# Patient Record
Sex: MALE | Race: WHITE | NOT HISPANIC OR LATINO | Employment: OTHER | URBAN - METROPOLITAN AREA
[De-identification: names, ages, dates, MRNs, and addresses within clinical notes are randomized per-mention and may not be internally consistent; named-entity substitution may affect disease eponyms.]

---

## 2017-09-20 ENCOUNTER — APPOINTMENT (EMERGENCY)
Dept: RADIOLOGY | Facility: HOSPITAL | Age: 57
End: 2017-09-20
Payer: COMMERCIAL

## 2017-09-20 ENCOUNTER — HOSPITAL ENCOUNTER (EMERGENCY)
Facility: HOSPITAL | Age: 57
Discharge: HOME | End: 2017-09-20
Admitting: EMERGENCY MEDICINE
Payer: COMMERCIAL

## 2017-09-20 VITALS
RESPIRATION RATE: 16 BRPM | DIASTOLIC BLOOD PRESSURE: 72 MMHG | TEMPERATURE: 98.2 F | HEIGHT: 72 IN | BODY MASS INDEX: 20.59 KG/M2 | OXYGEN SATURATION: 95 % | WEIGHT: 152 LBS | HEART RATE: 88 BPM | SYSTOLIC BLOOD PRESSURE: 124 MMHG

## 2017-09-20 DIAGNOSIS — S39.013A STRAIN OF MUSCLE OF RIGHT GROIN REGION: Primary | ICD-10-CM

## 2017-09-20 PROCEDURE — 73502 X-RAY EXAM HIP UNI 2-3 VIEWS: CPT

## 2017-09-20 PROCEDURE — 99283 EMERGENCY DEPT VISIT LOW MDM: CPT

## 2017-09-20 RX ORDER — PHENYTOIN SODIUM 300 MG/1
100 CAPSULE, EXTENDED RELEASE ORAL DAILY
COMMUNITY
End: 2019-07-30 | Stop reason: HOSPADM

## 2017-09-20 RX ORDER — OXYCODONE HYDROCHLORIDE 15 MG/1
15 TABLET, FILM COATED, EXTENDED RELEASE ORAL
COMMUNITY
End: 2019-07-30 | Stop reason: HOSPADM

## 2017-09-20 RX ORDER — LORAZEPAM 1 MG/1
0.5 TABLET ORAL EVERY 8 HOURS PRN
Status: ON HOLD | COMMUNITY
End: 2019-09-07 | Stop reason: CLARIF

## 2017-09-20 RX ORDER — IBUPROFEN 800 MG/1
800 TABLET ORAL EVERY 8 HOURS PRN
COMMUNITY
End: 2019-09-06 | Stop reason: ALTCHOICE

## 2017-09-20 RX ORDER — LIDOCAINE 50 MG/G
1 PATCH TOPICAL ONCE
Status: DISCONTINUED | OUTPATIENT
Start: 2017-09-20 | End: 2017-09-20 | Stop reason: HOSPADM

## 2017-09-20 RX ORDER — KETOROLAC TROMETHAMINE 30 MG/ML
15 INJECTION, SOLUTION INTRAMUSCULAR; INTRAVENOUS ONCE
Status: DISCONTINUED | OUTPATIENT
Start: 2017-09-20 | End: 2017-09-20

## 2017-09-20 RX ORDER — DIVALPROEX SODIUM 250 MG/1
250 TABLET, DELAYED RELEASE ORAL EVERY EVENING
Status: ON HOLD | COMMUNITY
End: 2019-07-30 | Stop reason: SDUPTHER

## 2017-09-20 RX ADMIN — LIDOCAINE 1 PATCH: 50 PATCH CUTANEOUS at 15:57

## 2017-11-21 ENCOUNTER — APPOINTMENT (EMERGENCY)
Dept: RADIOLOGY | Facility: HOSPITAL | Age: 57
End: 2017-11-21
Payer: COMMERCIAL

## 2017-11-21 ENCOUNTER — HOSPITAL ENCOUNTER (EMERGENCY)
Facility: HOSPITAL | Age: 57
Discharge: HOME | End: 2017-11-21
Attending: EMERGENCY MEDICINE
Payer: COMMERCIAL

## 2017-11-21 VITALS
TEMPERATURE: 98 F | HEIGHT: 72 IN | WEIGHT: 157 LBS | OXYGEN SATURATION: 97 % | DIASTOLIC BLOOD PRESSURE: 81 MMHG | BODY MASS INDEX: 21.26 KG/M2 | SYSTOLIC BLOOD PRESSURE: 136 MMHG | RESPIRATION RATE: 18 BRPM | HEART RATE: 84 BPM

## 2017-11-21 DIAGNOSIS — S20.219A: Primary | ICD-10-CM

## 2017-11-21 PROCEDURE — 71101 X-RAY EXAM UNILAT RIBS/CHEST: CPT

## 2017-11-21 PROCEDURE — 99283 EMERGENCY DEPT VISIT LOW MDM: CPT

## 2017-11-21 RX ORDER — TRAMADOL HYDROCHLORIDE 50 MG/1
50 TABLET ORAL EVERY 6 HOURS PRN
Qty: 20 TABLET | Refills: 0 | Status: SHIPPED | OUTPATIENT
Start: 2017-11-21 | End: 2019-07-30 | Stop reason: HOSPADM

## 2017-11-21 NOTE — DISCHARGE INSTRUCTIONS
Rib Contusion   WHAT YOU NEED TO KNOW:   A rib contusion is a bruise on one or more of your ribs  DISCHARGE INSTRUCTIONS:   Return to the emergency department if:   · You have increased chest pain  · You have shortness of breath  · You start to cough up blood  · Your pain does not improve with pain medicine  Contact your healthcare provider if:   · You have a cough  · You have a fever  · You have questions or concerns about your condition or care  Medicines: You may need any of the following:  · NSAIDs , such as ibuprofen, help decrease swelling, pain, and fever  This medicine is available with or without a doctor's order  NSAIDs can cause stomach bleeding or kidney problems in certain people  If you take blood thinner medicine, always ask if NSAIDs are safe for you  Always read the medicine label and follow directions  Do not give these medicines to children under 10months of age without direction from your child's healthcare provider  · Prescription pain medicine  may be given  Ask how to take this medicine safely  · Take your medicine as directed  Contact your healthcare provider if you think your medicine is not helping or if you have side effects  Tell him of her if you are allergic to any medicine  Keep a list of the medicines, vitamins, and herbs you take  Include the amounts, and when and why you take them  Bring the list or the pill bottles to follow-up visits  Carry your medicine list with you in case of an emergency  Deep breathing:   · To help prevent pneumonia, take 10 deep breaths every hour, even when you wake up during the night  Brace your ribs with your hands or a pillow while you take deep breaths or cough  This will help decrease your pain  · You may need to use an incentive spirometer to help you take deeper breaths  Put the plastic piece into your mouth and take a very deep breath  Hold your breath as long as you can  Then let out your breath   Do this 10 times in a row every hour while you are awake  Rest:  Rest your ribs to decrease swelling and allow the injury to heal faster  Avoid activities that may cause more pain or damage to your ribs  As your pain decreases, begin movements slowly  Ice:  Ice helps decrease swelling and pain  Ice may also help prevent tissue damage  Use an ice pack or put crushed ice in a plastic bag  Cover it with a towel and place it on your bruised area for 15 to 20 minutes every hour as directed  Follow up with your healthcare provider as directed:  Write down your questions so you remember to ask them during your visits  © 2017 [facility] Information is for End User's use only and may not be sold, redistributed or otherwise used for commercial purposes  All illustrations and images included in CareNotes® are the copyrighted property of [employer]  or [employer]  The above information is an  only  It is not intended as medical advice for individual conditions or treatments  Talk to your doctor, nurse or pharmacist before following any medical regimen to see if it is safe and effective for you    Rest, sleep with head elevated, Ibuprofen or ultram for pain, activity as tolerated, recheck with PCP as needed

## 2017-11-21 NOTE — ED PROVIDER NOTES
History  Chief Complaint   Patient presents with    Rib Pain     Pt fell two days ago and hit the left side of his cheast and is having pain  Pt fell against carpet roll 2 days ago and struck L lateral ribs  Hx of several old rib fractures, c/o increased pain with movement        History provided by:  Patient   used: No    Chest Pain   Pain location:  L lateral chest  Pain quality: sharp and shooting    Pain radiates to:  Does not radiate  Pain radiates to the back: no    Pain severity:  Moderate  Onset quality:  Sudden  Duration:  2 days  Progression:  Waxing and waning  Chronicity:  New  Context: breathing and movement    Relieved by:  Nothing  Worsened by: Movement      Prior to Admission Medications   Prescriptions Last Dose Informant Patient Reported? Taking? LORazepam (ATIVAN) 1 mg tablet   Yes No   Sig: Take 0 5 mg by mouth every 8 (eight) hours as needed for anxiety   divalproex sodium (DEPAKOTE) 500 mg EC tablet   Yes No   Sig: Take 25,000 mg by mouth daily   ibuprofen (MOTRIN) 800 mg tablet   Yes No   Sig: Take 800 mg by mouth every 8 (eight) hours as needed for mild pain   oxyCODONE (OxyCONTIN) 15 mg 12 hr tablet   Yes No   Sig: Take 10 mg by mouth every 4 (four) hours   phenytoin extended (DILANTIN) 300 MG ER capsule   Yes No   Sig: Take 300 mg by mouth daily      Facility-Administered Medications: None       Past Medical History:   Diagnosis Date    Anxiety     Back pain     History of herniated intervertebral disc     Seizures (HCC)     TBI (traumatic brain injury) ([facility] [address] )        Past Surgical History:   Procedure Laterality Date    BRAIN SURGERY         History reviewed  No pertinent family history  I have reviewed and agree with the history as documented      Social History   Substance Use Topics    Smoking status: Current Every Day Smoker     Types: Cigarettes    Smokeless tobacco: Current User    Alcohol use No        Review of Systems   Cardiovascular: Positive for chest pain  All other systems reviewed and are negative  Physical Exam  ED Triage Vitals [11/21/17 1712]   Temperature Pulse Respirations Blood Pressure SpO2   98 °F (36 7 °C) 84 18 136/81 97 %      Temp Source Heart Rate Source Patient Position - Orthostatic VS BP Location FiO2 (%)   Tympanic Monitor -- Right arm --      Pain Score       --           Orthostatic Vital Signs  Vitals:    11/21/17 1712   BP: 136/81   Pulse: 84       Physical Exam   Constitutional: He is oriented to person, place, and time  He appears well-developed and well-nourished  HENT:   Nose: Nose normal    Eyes: Pupils are equal, round, and reactive to light  Neck: Normal range of motion  Cardiovascular: Normal rate and regular rhythm  Pulmonary/Chest: Effort normal and breath sounds normal  He exhibits tenderness  Abdominal: Soft  Bowel sounds are normal    Neurological: He is alert and oriented to person, place, and time  Skin: Skin is warm and dry  Psychiatric: He has a normal mood and affect  His behavior is normal  Judgment and thought content normal    Nursing note and vitals reviewed  ED Medications  Medications - No data to display    Diagnostic Studies  Results Reviewed     None                 XR ribs with pa chest min 3 views LEFT   Final Result by Reynaldo Lomax MD (11/21 1810)      1  COPD without acute pulmonary findings  2   No evidence of rib fractures               Workstation performed: [id number]                    Procedures  Procedures       Phone Contacts  ED Phone Contact    ED Course  ED Course                                MDM  Number of Diagnoses or Management Options  Contusion of ribs: new and requires workup     Amount and/or Complexity of Data Reviewed  Tests in the radiology section of CPT®: ordered and reviewed    Patient Progress  Patient progress: stable    CritCare Time    Disposition  Final diagnoses:   Contusion of ribs     Time reflects when diagnosis was documented in both MDM as applicable and the Disposition within this note     Time User Action Codes Description Comment    11/21/2017  6:12 PM Kelly Marc Add [S20 [id number]] Contusion of ribs       ED Disposition     ED Disposition Condition Comment    Discharge  [facility] discharge to home/self care  Condition at discharge: Stable        Follow-up Information     Follow up With Specialties Details Why [address], DO Family Medicine  As needed, for follow-up [address]  [address] [place] [address] [place] [address] Road          Discharge Medication List as of 11/21/2017  6:14 PM      START taking these medications    Details   traMADol (ULTRAM) 50 mg tablet Take 1 tablet by mouth every 6 (six) hours as needed for moderate pain for up to 20 doses, Starting Tue 11/21/2017, Print         CONTINUE these medications which have NOT CHANGED    Details   divalproex sodium (DEPAKOTE) 500 mg EC tablet Take 25,000 mg by mouth daily, Historical Med      ibuprofen (MOTRIN) 800 mg tablet Take 800 mg by mouth every 8 (eight) hours as needed for mild pain, Historical Med      LORazepam (ATIVAN) 1 mg tablet Take 0 5 mg by mouth every 8 (eight) hours as needed for anxiety, Historical Med      oxyCODONE (OxyCONTIN) 15 mg 12 hr tablet Take 10 mg by mouth every 4 (four) hours, Historical Med      phenytoin extended (DILANTIN) 300 MG ER capsule Take 300 mg by mouth daily, Historical Med           No discharge procedures on file      ED Provider  Electronically Signed by           Jose Eldridge MD  11/22/17 3333

## 2018-03-31 ENCOUNTER — HOSPITAL ENCOUNTER (EMERGENCY)
Facility: HOSPITAL | Age: 58
Discharge: HOME | End: 2018-03-31
Attending: EMERGENCY MEDICINE | Admitting: EMERGENCY MEDICINE
Payer: COMMERCIAL

## 2018-03-31 ENCOUNTER — APPOINTMENT (EMERGENCY)
Dept: CT IMAGING | Facility: HOSPITAL | Age: 58
End: 2018-03-31
Payer: COMMERCIAL

## 2018-03-31 VITALS
TEMPERATURE: 98.3 F | HEIGHT: 72 IN | WEIGHT: 151.13 LBS | BODY MASS INDEX: 20.47 KG/M2 | OXYGEN SATURATION: 98 % | DIASTOLIC BLOOD PRESSURE: 59 MMHG | RESPIRATION RATE: 18 BRPM | HEART RATE: 80 BPM | SYSTOLIC BLOOD PRESSURE: 119 MMHG

## 2018-03-31 DIAGNOSIS — S01.01XA SCALP LACERATION, INITIAL ENCOUNTER: ICD-10-CM

## 2018-03-31 DIAGNOSIS — S09.90XA INJURY OF HEAD, INITIAL ENCOUNTER: Primary | ICD-10-CM

## 2018-03-31 PROCEDURE — 70450 CT HEAD/BRAIN W/O DYE: CPT

## 2018-03-31 PROCEDURE — 99283 EMERGENCY DEPT VISIT LOW MDM: CPT

## 2018-03-31 RX ORDER — GINSENG 100 MG
1 CAPSULE ORAL ONCE
Status: COMPLETED | OUTPATIENT
Start: 2018-03-31 | End: 2018-03-31

## 2018-03-31 RX ORDER — LIDOCAINE HYDROCHLORIDE AND EPINEPHRINE 10; 10 MG/ML; UG/ML
1 INJECTION, SOLUTION INFILTRATION; PERINEURAL ONCE
Status: COMPLETED | OUTPATIENT
Start: 2018-03-31 | End: 2018-03-31

## 2018-03-31 RX ADMIN — BACITRACIN ZINC 1 SMALL APPLICATION: 500 OINTMENT TOPICAL at 18:51

## 2018-03-31 RX ADMIN — LIDOCAINE HYDROCHLORIDE,EPINEPHRINE BITARTRATE 1 ML: 10; .01 INJECTION, SOLUTION INFILTRATION; PERINEURAL at 18:20

## 2018-03-31 NOTE — DISCHARGE INSTRUCTIONS
Head Injury   WHAT YOU NEED TO KNOW:   A head injury is most often caused by a blow to the head  This may occur from a fall, bicycle injury, sports injury, being struck in the head, or a motor vehicle accident  DISCHARGE INSTRUCTIONS:   Call 911 or have someone else call for any of the following:   · You cannot be woken  · You have a seizure  · You stop responding to others or you faint  · You have blurry or double vision  · Your speech becomes slurred or confused  · You have arm or leg weakness, loss of feeling, or new problems with coordination  · Your pupils are larger than usual or one pupil is a different size than the other  · You have blood or clear fluid coming out of your ears or nose  Seek care immediately if:   · You have repeated or forceful vomiting  · You feel confused  · Your headache gets worse or becomes severe  · You or someone caring for you notices that you are harder to wake than usual   Contact your healthcare provider if:   · Your symptoms last longer than 6 weeks after the injury  · You have questions or concerns about your condition or care  Medicines:   · Acetaminophen  decreases pain  Acetaminophen is available without a doctor's order  Ask how much to take and how often to take it  Follow directions  Acetaminophen can cause liver damage if not taken correctly  · Take your medicine as directed  Contact your healthcare provider if you think your medicine is not helping or if you have side effects  Tell him or her if you are allergic to any medicine  Keep a list of the medicines, vitamins, and herbs you take  Include the amounts, and when and why you take them  Bring the list or the pill bottles to follow-up visits  Carry your medicine list with you in case of an emergency  Self-care:   · Rest  or do quiet activities for 24 to 48 hours  Limit your time watching TV, using the computer, or doing tasks that require a lot of thinking   Slowly return to your normal activities as directed  Do not play sports or do activities that may cause you to get hit in the head  Ask your healthcare provider when you can return to sports  · Apply ice  on your head for 15 to 20 minutes every hour or as directed  Use an ice pack, or put crushed ice in a plastic bag  Cover it with a towel before you apply it to your skin  Ice helps prevent tissue damage and decreases swelling and pain  · Have someone stay with you for 24 hours  or as directed  This person can monitor you for complications and call [phone]  When you are awake the person should ask you a few questions to see if you are thinking clearly  An example would be to ask your name or your address  Prevent another head injury:   · Wear a helmet that fits properly  Do this when you play sports, or ride a bike, scooter, or skateboard  Helmets help decrease your risk of a serious head injury  Talk to your healthcare provider about other ways you can protect yourself if you play sports  · Wear your seat belt every time you are in a car  This helps to decrease your risk for a head injury if you are in a car accident  Follow up with your healthcare provider as directed:  Write down your questions so you remember to ask them during your visits  © 2017 [address] [employer]  Information is for End User's use only and may not be sold, redistributed or otherwise used for commercial purposes  All illustrations and images included in CareNotes® are the copyrighted property of [employer]  or [employer]  The above information is an  only  It is not intended as medical advice for individual conditions or treatments  Talk to your doctor, nurse or pharmacist before following any medical regimen to see if it is safe and effective for you  Laceration   AMBULATORY CARE:   A laceration  is an injury to the skin and the soft tissue underneath it   Lacerations happen when you are cut or hit by something  They can happen anywhere on the body  Common symptoms include the following:   · Injury or wound to skin and tissue of any shape size that looks like a cut, tear, or gash    · Edges of the wound may be close together or wide apart    · Pain, bleeding, bruising, or swelling    · Numbness around the wound    · Decreased movement in an area below the wound  Seek care immediately if:   · You have heavy bleeding or bleeding that does not stop after 10 minutes of holding firm, direct pressure over the wound  · Your wound opens up  Contact your healthcare provider if:   · You have a fever or chills  · Your laceration is red, warm, or swollen  · You have red streaks on your skin coming from your wound  · You have white or yellow drainage from the wound that smells bad  · You have pain that gets worse, even after treatment  · You have questions or concerns about your condition or care  Treatment for a laceration  includes care to stop any bleeding  Your healthcare provider will stop the bleeding by applying pressure to the wound  He may need to check your wound for foreign objects and clean it to decrease the chance of infection  Your laceration may be closed with stitches, staples, tissue glue, or medical strips  Ask your healthcare provider if you need a tetanus shot  Medicines:   · Prescription pain medicine  may be given  Ask how to take this medicine safely  · Antibiotics  help treat or prevent a bacterial infection  · Take your medicine as directed  Contact your healthcare provider if you think your medicine is not helping or if you have side effects  Tell him or her if you are allergic to any medicine  Keep a list of the medicines, vitamins, and herbs you take  Include the amounts, and when and why you take them  Bring the list or the pill bottles to follow-up visits  Carry your medicine list with you in case of an emergency    Care for your wound as directed:   · Do not get your wound wet  until your healthcare provider says it is okay  Do not soak your wound in water  Do not go swimming until your healthcare provider says it is okay  Carefully wash the wound with soap and water  Gently pat the area dry or allow it to air dry  · Change your bandages  when they get wet, dirty, or after washing  Apply new, clean bandages as directed  Do not apply elastic bandages or tape too tight  Do not put powders or lotions over your incision  · Apply antibiotic ointment as directed  Your healthcare provider may give you antibiotic ointment to put over your wound if you have stitches  If you have strips of tape over your incision, let them dry up and fall off on their own  If they do not fall off within 14 days, gently remove them  If you have glue over your wound, do not remove or pick at it  If your glue comes off, do not replace it with glue that you have at home  · Check your wound every day for signs of infection such as swelling, redness, or pus  Self-care:   · Apply ice  on your wound for 15 to 20 minutes every hour or as directed  Use an ice pack, or put crushed ice in a plastic bag  Cover it with a towel  Ice helps prevent tissue damage and decreases swelling and pain  · Use a splint as directed  A splint will decrease movement and stress on your wound  It may help it heal faster  A splint may be used for lacerations over joints or areas of your body that bend  Ask your healthcare provider how to apply and remove a splint  · Decrease scarring of your wound  by applying ointments as directed  Do not apply ointments until your healthcare provider says it is okay  You may need to wait until your wound is healed  Ask which ointment to buy and how often to use it  After your wound is healed, use sunscreen over the area when you are out in the sun  You should do this for at least 6 months to 1 year after your injury    Follow up with your healthcare provider as directed: Write down your questions so you remember to ask them during your visits  © 2017 [address] [employer] Information is for End User's use only and may not be sold, redistributed or otherwise used for commercial purposes  All illustrations and images included in CareNotes® are the copyrighted property of A [employer] M [employer]  or [employer]  The above information is an  only  It is not intended as medical advice for individual conditions or treatments  Talk to your doctor, nurse or pharmacist before following any medical regimen to see if it is safe and effective for you

## 2018-03-31 NOTE — ED NOTES
Discharge instructions reviewed with patient  All questions and concerns addressed        Kendy Cook RN  03/31/18 0775

## 2018-03-31 NOTE — ED NOTES
Physician at bedside suturing patient   Patient tolerated well      Jess Leung, MICHAEL  03/31/18 1622

## 2018-03-31 NOTE — ED NOTES
Physician notified that patient meets trauma criteria  Physician does not feel that patient is a trauma at this time   Proceed with ED narrator      Zena Xie RN  03/31/18 7018

## 2018-03-31 NOTE — ED NOTES
Patient returned from [facility] 029, [address] [facility]  03/31/18 7509

## 2018-03-31 NOTE — ED NOTES
Patient transported to [facility] 270, [address] [facility]  03/31/18 6129

## 2018-03-31 NOTE — ED PROVIDER NOTES
History  No chief complaint on file  Headboard of a bed fell, struck pt on L parietal area; no LOC but pt has a hx of traumatic brain injury  1" lac noted, no bleeding        History provided by:  Patient and friend   used: No    Head Laceration   Location:  Head/neck  Head/neck laceration location:  Scalp  Length:  2 5  Depth: Through dermis  Quality: straight    Bleeding: controlled    Laceration mechanism:  Blunt object  Pain details:     Quality:  Aching    Severity:  Mild    Progression:  Unchanged  Foreign body present:  No foreign bodies  Relieved by:  Nothing  Worsened by:  Nothing  Ineffective treatments:  None tried      Cannot display prior to admission medications because the patient has not been admitted in this contact  Past Medical History:   Diagnosis Date    Anxiety     Back pain     History of herniated intervertebral disc     Seizures (HCC)     TBI (traumatic brain injury) ([facility] [address] )        Past Surgical History:   Procedure Laterality Date    BRAIN SURGERY         No family history on file  I have reviewed and agree with the history as documented  Social History   Substance Use Topics    Smoking status: Current Every Day Smoker     Types: Cigarettes    Smokeless tobacco: Current User    Alcohol use No        Review of Systems   Neurological: Positive for headaches (at site of injury)  All other systems reviewed and are negative  Physical Exam  ED Triage Vitals   Temp Pulse Resp BP SpO2   -- -- -- -- --      Temp src Heart Rate Source Patient Position - Orthostatic VS BP Location FiO2 (%)   -- -- -- -- --      Pain Score       --           Orthostatic Vital Signs  There were no vitals filed for this visit  Physical Exam   Constitutional: He is oriented to person, place, and time  He appears well-developed and well-nourished  HENT:   Nose: Nose normal    Eyes: Pupils are equal, round, and reactive to light  Neck: Normal range of motion  Cardiovascular: Normal rate  Pulmonary/Chest: Effort normal    Abdominal: Soft  Neurological: He is alert and oriented to person, place, and time  Skin: Skin is warm and dry  Psychiatric: He has a normal mood and affect  His behavior is normal  Judgment and thought content normal    Nursing note and vitals reviewed  ED Medications  Medications - No data to display    Diagnostic Studies  Results Reviewed     None                 No orders to display              Procedures  Lac Repair  Date/Time: 3/31/2018 6:48 PM  Performed by: Melani Auguste  Authorized by: Melani Auguste   Consent: Verbal consent obtained  Body area: head/neck  Location details: scalp  Laceration length: 2 5 cm  Anesthesia: local infiltration    Anesthesia:  Local Anesthetic: lidocaine 1% with epinephrine    Sedation:  Patient sedated: no    Wound Dehiscence:  Superficial Wound Dehiscence: simple closure      Procedure Details:  Preparation: Patient was prepped and draped in the usual sterile fashion  Debridement: none  Degree of undermining: none  Skin closure: 5-0 nylon  Technique: simple  Approximation: close  Approximation difficulty: simple  Dressing: antibiotic ointment  Patient tolerance: Patient tolerated the procedure well with no immediate complications             Phone Contacts  ED Phone Contact    ED Course  ED Course                                MDM  Number of Diagnoses or Management Options  Injury of head, initial encounter: new and requires workup  Scalp laceration, initial encounter: new and requires workup     Amount and/or Complexity of Data Reviewed  Tests in the radiology section of CPT®: ordered and reviewed    Patient Progress  Patient progress: stable    CritCare Time    Disposition  Final diagnoses:   None     ED Disposition     None      Follow-up Information    None       Patient's Medications   Discharge Prescriptions    No medications on file     No discharge procedures on file      ED Provider  Electronically Signed by           Stacey Winter MD  04/01/18 7206

## 2019-02-25 ENCOUNTER — TRANSCRIBE ORDERS (OUTPATIENT)
Dept: ADMINISTRATIVE | Facility: HOSPITAL | Age: 59
End: 2019-02-25

## 2019-02-25 ENCOUNTER — HOSPITAL ENCOUNTER (OUTPATIENT)
Dept: RADIOLOGY | Facility: HOSPITAL | Age: 59
Discharge: HOME | End: 2019-02-25
Payer: COMMERCIAL

## 2019-02-25 DIAGNOSIS — M54.5 LOW BACK PAIN, UNSPECIFIED BACK PAIN LATERALITY, UNSPECIFIED CHRONICITY, WITH SCIATICA PRESENCE UNSPECIFIED: Primary | ICD-10-CM

## 2019-02-25 DIAGNOSIS — R63.4 ABNORMAL WEIGHT LOSS: ICD-10-CM

## 2019-02-25 DIAGNOSIS — M54.5 LOW BACK PAIN, UNSPECIFIED BACK PAIN LATERALITY, UNSPECIFIED CHRONICITY, WITH SCIATICA PRESENCE UNSPECIFIED: ICD-10-CM

## 2019-02-25 PROCEDURE — 72110 X-RAY EXAM L-2 SPINE 4/>VWS: CPT

## 2019-02-25 PROCEDURE — 71046 X-RAY EXAM CHEST 2 VIEWS: CPT

## 2019-05-29 ENCOUNTER — TRANSCRIBE ORDERS (OUTPATIENT)
Dept: ADMINISTRATIVE | Facility: HOSPITAL | Age: 59
End: 2019-05-29

## 2019-05-29 ENCOUNTER — APPOINTMENT (OUTPATIENT)
Dept: LAB | Facility: HOSPITAL | Age: 59
End: 2019-05-29
Payer: COMMERCIAL

## 2019-05-29 DIAGNOSIS — M89.9 BONE DISEASE: ICD-10-CM

## 2019-05-29 DIAGNOSIS — E78.00 PURE HYPERCHOLESTEROLEMIA: ICD-10-CM

## 2019-05-29 DIAGNOSIS — S32.010A CLOSED WEDGE COMPRESSION FRACTURE OF FIRST LUMBAR VERTEBRA, INITIAL ENCOUNTER: ICD-10-CM

## 2019-05-29 DIAGNOSIS — M89.9 BONE DISEASE: Primary | ICD-10-CM

## 2019-05-29 LAB
25(OH)D3 SERPL-MCNC: 7.1 NG/ML (ref 30–100)
ALBUMIN SERPL BCP-MCNC: 3.3 G/DL (ref 3.5–5)
ALP SERPL-CCNC: 76 U/L (ref 46–116)
ALT SERPL W P-5'-P-CCNC: 19 U/L (ref 12–78)
ANION GAP SERPL CALCULATED.3IONS-SCNC: 7 MMOL/L (ref 4–13)
AST SERPL W P-5'-P-CCNC: 10 U/L (ref 5–45)
BILIRUB SERPL-MCNC: 0.5 MG/DL (ref 0.2–1)
BUN SERPL-MCNC: 10 MG/DL (ref 5–25)
CALCIUM SERPL-MCNC: 9.3 MG/DL (ref 8.3–10.1)
CHLORIDE SERPL-SCNC: 103 MMOL/L (ref 100–108)
CO2 SERPL-SCNC: 31 MMOL/L (ref 21–32)
CREAT SERPL-MCNC: 0.78 MG/DL (ref 0.6–1.3)
GFR SERPL CREATININE-BSD FRML MDRD: 99 ML/MIN/1.73SQ M
GLUCOSE SERPL-MCNC: 89 MG/DL (ref 65–140)
POTASSIUM SERPL-SCNC: 4.1 MMOL/L (ref 3.5–5.3)
PROT SERPL-MCNC: 7.3 G/DL (ref 6.4–8.2)
SODIUM SERPL-SCNC: 141 MMOL/L (ref 136–145)

## 2019-05-29 PROCEDURE — 80053 COMPREHEN METABOLIC PANEL: CPT

## 2019-05-29 PROCEDURE — 82306 VITAMIN D 25 HYDROXY: CPT

## 2019-05-29 PROCEDURE — 36415 COLL VENOUS BLD VENIPUNCTURE: CPT

## 2019-06-06 ENCOUNTER — TRANSCRIBE ORDERS (OUTPATIENT)
Dept: ADMINISTRATIVE | Facility: HOSPITAL | Age: 59
End: 2019-06-06

## 2019-06-06 DIAGNOSIS — S32.010S COMPRESSION FRACTURE OF L1 LUMBAR VERTEBRA, SEQUELA: Primary | ICD-10-CM

## 2019-06-07 ENCOUNTER — APPOINTMENT (OUTPATIENT)
Dept: LAB | Facility: HOSPITAL | Age: 59
End: 2019-06-07
Payer: OTHER MISCELLANEOUS

## 2019-06-07 ENCOUNTER — TRANSCRIBE ORDERS (OUTPATIENT)
Dept: ADMINISTRATIVE | Facility: HOSPITAL | Age: 59
End: 2019-06-07

## 2019-06-07 DIAGNOSIS — G40.209 COMPLEX PARTIAL SEIZURES WITH CONSCIOUSNESS IMPAIRED (HCC): ICD-10-CM

## 2019-06-07 DIAGNOSIS — G40.209 COMPLEX PARTIAL SEIZURES WITH CONSCIOUSNESS IMPAIRED (HCC): Primary | ICD-10-CM

## 2019-06-07 LAB
ALBUMIN SERPL BCP-MCNC: 3.3 G/DL (ref 3.5–5)
ALP SERPL-CCNC: 66 U/L (ref 46–116)
ALT SERPL W P-5'-P-CCNC: 20 U/L (ref 12–78)
ANION GAP SERPL CALCULATED.3IONS-SCNC: 6 MMOL/L (ref 4–13)
AST SERPL W P-5'-P-CCNC: 11 U/L (ref 5–45)
BASOPHILS # BLD AUTO: 0.03 THOUSANDS/ΜL (ref 0–0.1)
BASOPHILS NFR BLD AUTO: 1 % (ref 0–1)
BILIRUB SERPL-MCNC: 0.2 MG/DL (ref 0.2–1)
BUN SERPL-MCNC: 15 MG/DL (ref 5–25)
CALCIUM SERPL-MCNC: 8.8 MG/DL (ref 8.3–10.1)
CHLORIDE SERPL-SCNC: 104 MMOL/L (ref 100–108)
CO2 SERPL-SCNC: 29 MMOL/L (ref 21–32)
CREAT SERPL-MCNC: 0.84 MG/DL (ref 0.6–1.3)
EOSINOPHIL # BLD AUTO: 0.1 THOUSAND/ΜL (ref 0–0.61)
EOSINOPHIL NFR BLD AUTO: 2 % (ref 0–6)
ERYTHROCYTE [DISTWIDTH] IN BLOOD BY AUTOMATED COUNT: 12 % (ref 11.6–15.1)
GFR SERPL CREATININE-BSD FRML MDRD: 96 ML/MIN/1.73SQ M
GLUCOSE SERPL-MCNC: 72 MG/DL (ref 65–140)
HCT VFR BLD AUTO: 40.4 % (ref 36.5–49.3)
HGB BLD-MCNC: 13.6 G/DL (ref 12–17)
IMM GRANULOCYTES # BLD AUTO: 0.01 THOUSAND/UL (ref 0–0.2)
IMM GRANULOCYTES NFR BLD AUTO: 0 % (ref 0–2)
LYMPHOCYTES # BLD AUTO: 2.09 THOUSANDS/ΜL (ref 0.6–4.47)
LYMPHOCYTES NFR BLD AUTO: 41 % (ref 14–44)
MCH RBC QN AUTO: 33.6 PG (ref 26.8–34.3)
MCHC RBC AUTO-ENTMCNC: 33.7 G/DL (ref 31.4–37.4)
MCV RBC AUTO: 100 FL (ref 82–98)
MONOCYTES # BLD AUTO: 0.56 THOUSAND/ΜL (ref 0.17–1.22)
MONOCYTES NFR BLD AUTO: 11 % (ref 4–12)
NEUTROPHILS # BLD AUTO: 2.3 THOUSANDS/ΜL (ref 1.85–7.62)
NEUTS SEG NFR BLD AUTO: 45 % (ref 43–75)
NRBC BLD AUTO-RTO: 0 /100 WBCS
PHENYTOIN SERPL-MCNC: 3.3 UG/ML (ref 10–20)
PLATELET # BLD AUTO: 325 THOUSANDS/UL (ref 149–390)
PMV BLD AUTO: 9.7 FL (ref 8.9–12.7)
POTASSIUM SERPL-SCNC: 4 MMOL/L (ref 3.5–5.3)
PROT SERPL-MCNC: 7 G/DL (ref 6.4–8.2)
RBC # BLD AUTO: 4.05 MILLION/UL (ref 3.88–5.62)
SODIUM SERPL-SCNC: 139 MMOL/L (ref 136–145)
VALPROATE SERPL-MCNC: 54 UG/ML (ref 50–100)
WBC # BLD AUTO: 5.09 THOUSAND/UL (ref 4.31–10.16)

## 2019-06-07 PROCEDURE — 80185 ASSAY OF PHENYTOIN TOTAL: CPT

## 2019-06-07 PROCEDURE — 85025 COMPLETE CBC W/AUTO DIFF WBC: CPT

## 2019-06-07 PROCEDURE — 36415 COLL VENOUS BLD VENIPUNCTURE: CPT

## 2019-06-07 PROCEDURE — 80164 ASSAY DIPROPYLACETIC ACD TOT: CPT

## 2019-06-07 PROCEDURE — 80053 COMPREHEN METABOLIC PANEL: CPT

## 2019-07-28 ENCOUNTER — HOSPITAL ENCOUNTER (OUTPATIENT)
Facility: HOSPITAL | Age: 59
Setting detail: OBSERVATION
Discharge: HOME HEALTH | End: 2019-07-30
Attending: EMERGENCY MEDICINE | Admitting: INTERNAL MEDICINE
Payer: COMMERCIAL

## 2019-07-28 DIAGNOSIS — T42.0X1A: Primary | ICD-10-CM

## 2019-07-28 DIAGNOSIS — S00.411A: ICD-10-CM

## 2019-07-28 DIAGNOSIS — Z87.820 H/O TRAUMATIC BRAIN INJURY: Chronic | ICD-10-CM

## 2019-07-28 DIAGNOSIS — R29.6 MULTIPLE FALLS: ICD-10-CM

## 2019-07-28 DIAGNOSIS — R27.0 ATAXIA: ICD-10-CM

## 2019-07-28 DIAGNOSIS — R26.2 AMBULATORY DYSFUNCTION: ICD-10-CM

## 2019-07-28 DIAGNOSIS — F11.20 OPIATE DEPENDENCE, CONTINUOUS (HCC): ICD-10-CM

## 2019-07-28 DIAGNOSIS — G40.909 SEIZURE DISORDER (HCC): ICD-10-CM

## 2019-07-28 PROCEDURE — 99285 EMERGENCY DEPT VISIT HI MDM: CPT | Performed by: EMERGENCY MEDICINE

## 2019-07-28 PROCEDURE — 99285 EMERGENCY DEPT VISIT HI MDM: CPT

## 2019-07-28 PROCEDURE — 93005 ELECTROCARDIOGRAM TRACING: CPT

## 2019-07-29 ENCOUNTER — APPOINTMENT (EMERGENCY)
Dept: CT IMAGING | Facility: HOSPITAL | Age: 59
End: 2019-07-29
Payer: COMMERCIAL

## 2019-07-29 ENCOUNTER — APPOINTMENT (EMERGENCY)
Dept: RADIOLOGY | Facility: HOSPITAL | Age: 59
End: 2019-07-29
Payer: COMMERCIAL

## 2019-07-29 PROBLEM — T42.0X1A ACCIDENTAL PHENYTOIN POISONING: Status: ACTIVE | Noted: 2019-07-29

## 2019-07-29 PROBLEM — R27.0 ATAXIA: Status: ACTIVE | Noted: 2019-07-29

## 2019-07-29 PROBLEM — G40.909 SEIZURE DISORDER (HCC): Status: ACTIVE | Noted: 2019-07-29

## 2019-07-29 PROBLEM — Z87.820 H/O TRAUMATIC BRAIN INJURY: Chronic | Status: ACTIVE | Noted: 2019-07-29

## 2019-07-29 LAB
ANION GAP SERPL CALCULATED.3IONS-SCNC: 9 MMOL/L (ref 4–13)
APTT PPP: 29 SECONDS (ref 23–37)
ATRIAL RATE: 77 BPM
ATRIAL RATE: 78 BPM
ATRIAL RATE: 84 BPM
BASOPHILS # BLD AUTO: 0.02 THOUSANDS/ΜL (ref 0–0.1)
BASOPHILS NFR BLD AUTO: 0 % (ref 0–1)
BILIRUB UR QL STRIP: NEGATIVE
BUN SERPL-MCNC: 17 MG/DL (ref 5–25)
CALCIUM SERPL-MCNC: 9 MG/DL (ref 8.3–10.1)
CHLORIDE SERPL-SCNC: 102 MMOL/L (ref 100–108)
CLARITY UR: CLEAR
CO2 SERPL-SCNC: 29 MMOL/L (ref 21–32)
COLOR UR: YELLOW
CREAT SERPL-MCNC: 0.67 MG/DL (ref 0.6–1.3)
EOSINOPHIL # BLD AUTO: 0.01 THOUSAND/ΜL (ref 0–0.61)
EOSINOPHIL NFR BLD AUTO: 0 % (ref 0–6)
ERYTHROCYTE [DISTWIDTH] IN BLOOD BY AUTOMATED COUNT: 12.2 % (ref 11.6–15.1)
GFR SERPL CREATININE-BSD FRML MDRD: 104 ML/MIN/1.73SQ M
GLUCOSE SERPL-MCNC: 137 MG/DL (ref 65–140)
GLUCOSE UR STRIP-MCNC: NEGATIVE MG/DL
HCT VFR BLD AUTO: 40.4 % (ref 36.5–49.3)
HGB BLD-MCNC: 13.8 G/DL (ref 12–17)
HGB UR QL STRIP.AUTO: NEGATIVE
IMM GRANULOCYTES # BLD AUTO: 0.03 THOUSAND/UL (ref 0–0.2)
IMM GRANULOCYTES NFR BLD AUTO: 1 % (ref 0–2)
INR PPP: 1.05 (ref 0.84–1.19)
KETONES UR STRIP-MCNC: ABNORMAL MG/DL
LEUKOCYTE ESTERASE UR QL STRIP: NEGATIVE
LYMPHOCYTES # BLD AUTO: 0.8 THOUSANDS/ΜL (ref 0.6–4.47)
LYMPHOCYTES NFR BLD AUTO: 14 % (ref 14–44)
MCH RBC QN AUTO: 33.7 PG (ref 26.8–34.3)
MCHC RBC AUTO-ENTMCNC: 34.2 G/DL (ref 31.4–37.4)
MCV RBC AUTO: 99 FL (ref 82–98)
MONOCYTES # BLD AUTO: 0.77 THOUSAND/ΜL (ref 0.17–1.22)
MONOCYTES NFR BLD AUTO: 13 % (ref 4–12)
NEUTROPHILS # BLD AUTO: 4.13 THOUSANDS/ΜL (ref 1.85–7.62)
NEUTS SEG NFR BLD AUTO: 72 % (ref 43–75)
NITRITE UR QL STRIP: NEGATIVE
NRBC BLD AUTO-RTO: 0 /100 WBCS
P AXIS: 71 DEGREES
P AXIS: 71 DEGREES
P AXIS: 79 DEGREES
PH UR STRIP.AUTO: 6 [PH]
PHENYTOIN SERPL-MCNC: 37.1 UG/ML (ref 10–20)
PLATELET # BLD AUTO: 180 THOUSANDS/UL (ref 149–390)
PMV BLD AUTO: 10.3 FL (ref 8.9–12.7)
POTASSIUM SERPL-SCNC: 3.6 MMOL/L (ref 3.5–5.3)
PR INTERVAL: 138 MS
PR INTERVAL: 142 MS
PR INTERVAL: 144 MS
PROT UR STRIP-MCNC: NEGATIVE MG/DL
PROTHROMBIN TIME: 13.4 SECONDS (ref 11.6–14.5)
QRS AXIS: 58 DEGREES
QRS AXIS: 65 DEGREES
QRS AXIS: 79 DEGREES
QRSD INTERVAL: 74 MS
QRSD INTERVAL: 84 MS
QRSD INTERVAL: 84 MS
QT INTERVAL: 358 MS
QT INTERVAL: 362 MS
QT INTERVAL: 368 MS
QTC INTERVAL: 405 MS
QTC INTERVAL: 412 MS
QTC INTERVAL: 434 MS
RBC # BLD AUTO: 4.1 MILLION/UL (ref 3.88–5.62)
SODIUM SERPL-SCNC: 140 MMOL/L (ref 136–145)
SP GR UR STRIP.AUTO: 1.02 (ref 1–1.03)
T WAVE AXIS: 27 DEGREES
T WAVE AXIS: 36 DEGREES
T WAVE AXIS: 43 DEGREES
TROPONIN I SERPL-MCNC: 0.05 NG/ML
TROPONIN I SERPL-MCNC: 0.07 NG/ML
UROBILINOGEN UR QL STRIP.AUTO: 0.2 E.U./DL
VALPROATE SERPL-MCNC: 94 UG/ML (ref 50–100)
VENTRICULAR RATE: 77 BPM
VENTRICULAR RATE: 78 BPM
VENTRICULAR RATE: 84 BPM
WBC # BLD AUTO: 5.76 THOUSAND/UL (ref 4.31–10.16)

## 2019-07-29 PROCEDURE — 36415 COLL VENOUS BLD VENIPUNCTURE: CPT | Performed by: EMERGENCY MEDICINE

## 2019-07-29 PROCEDURE — 71045 X-RAY EXAM CHEST 1 VIEW: CPT

## 2019-07-29 PROCEDURE — 93010 ELECTROCARDIOGRAM REPORT: CPT | Performed by: INTERNAL MEDICINE

## 2019-07-29 PROCEDURE — 84484 ASSAY OF TROPONIN QUANT: CPT | Performed by: EMERGENCY MEDICINE

## 2019-07-29 PROCEDURE — 80048 BASIC METABOLIC PNL TOTAL CA: CPT | Performed by: EMERGENCY MEDICINE

## 2019-07-29 PROCEDURE — 96374 THER/PROPH/DIAG INJ IV PUSH: CPT

## 2019-07-29 PROCEDURE — 93005 ELECTROCARDIOGRAM TRACING: CPT

## 2019-07-29 PROCEDURE — 85730 THROMBOPLASTIN TIME PARTIAL: CPT | Performed by: EMERGENCY MEDICINE

## 2019-07-29 PROCEDURE — 85610 PROTHROMBIN TIME: CPT | Performed by: EMERGENCY MEDICINE

## 2019-07-29 PROCEDURE — 72125 CT NECK SPINE W/O DYE: CPT

## 2019-07-29 PROCEDURE — 80164 ASSAY DIPROPYLACETIC ACD TOT: CPT | Performed by: EMERGENCY MEDICINE

## 2019-07-29 PROCEDURE — 72131 CT LUMBAR SPINE W/O DYE: CPT

## 2019-07-29 PROCEDURE — 81003 URINALYSIS AUTO W/O SCOPE: CPT | Performed by: EMERGENCY MEDICINE

## 2019-07-29 PROCEDURE — 80185 ASSAY OF PHENYTOIN TOTAL: CPT | Performed by: EMERGENCY MEDICINE

## 2019-07-29 PROCEDURE — 70450 CT HEAD/BRAIN W/O DYE: CPT

## 2019-07-29 PROCEDURE — 85025 COMPLETE CBC W/AUTO DIFF WBC: CPT | Performed by: EMERGENCY MEDICINE

## 2019-07-29 PROCEDURE — 99204 OFFICE O/P NEW MOD 45 MIN: CPT | Performed by: PSYCHIATRY & NEUROLOGY

## 2019-07-29 PROCEDURE — 99220 PR INITIAL OBSERVATION CARE/DAY 70 MINUTES: CPT | Performed by: INTERNAL MEDICINE

## 2019-07-29 RX ORDER — OXYCODONE HCL 10 MG/1
10 TABLET, FILM COATED, EXTENDED RELEASE ORAL EVERY 12 HOURS SCHEDULED
Status: DISCONTINUED | OUTPATIENT
Start: 2019-07-29 | End: 2019-07-29

## 2019-07-29 RX ORDER — IBUPROFEN 400 MG/1
800 TABLET ORAL EVERY 8 HOURS PRN
Status: DISCONTINUED | OUTPATIENT
Start: 2019-07-29 | End: 2019-07-30 | Stop reason: HOSPADM

## 2019-07-29 RX ORDER — NICOTINE 21 MG/24HR
14 PATCH, TRANSDERMAL 24 HOURS TRANSDERMAL DAILY
Status: DISCONTINUED | OUTPATIENT
Start: 2019-07-29 | End: 2019-07-30 | Stop reason: HOSPADM

## 2019-07-29 RX ORDER — LORAZEPAM 0.5 MG/1
0.5 TABLET ORAL EVERY 8 HOURS PRN
Status: DISCONTINUED | OUTPATIENT
Start: 2019-07-29 | End: 2019-07-30 | Stop reason: HOSPADM

## 2019-07-29 RX ORDER — DOCUSATE SODIUM 100 MG/1
100 CAPSULE, LIQUID FILLED ORAL 2 TIMES DAILY
Status: DISCONTINUED | OUTPATIENT
Start: 2019-07-29 | End: 2019-07-30 | Stop reason: HOSPADM

## 2019-07-29 RX ORDER — SENNOSIDES 8.6 MG
1 TABLET ORAL
Status: DISCONTINUED | OUTPATIENT
Start: 2019-07-29 | End: 2019-07-30 | Stop reason: HOSPADM

## 2019-07-29 RX ORDER — TRAMADOL HYDROCHLORIDE 50 MG/1
50 TABLET ORAL EVERY 6 HOURS PRN
Status: DISCONTINUED | OUTPATIENT
Start: 2019-07-29 | End: 2019-07-30 | Stop reason: HOSPADM

## 2019-07-29 RX ORDER — OXYCODONE HYDROCHLORIDE 5 MG/1
5 TABLET ORAL EVERY 4 HOURS PRN
Status: DISCONTINUED | OUTPATIENT
Start: 2019-07-29 | End: 2019-07-30

## 2019-07-29 RX ORDER — MIDAZOLAM HYDROCHLORIDE 1 MG/ML
1.5 INJECTION INTRAMUSCULAR; INTRAVENOUS ONCE
Status: COMPLETED | OUTPATIENT
Start: 2019-07-29 | End: 2019-07-29

## 2019-07-29 RX ORDER — DIVALPROEX SODIUM 250 MG/1
1000 TABLET, DELAYED RELEASE ORAL DAILY
Status: DISCONTINUED | OUTPATIENT
Start: 2019-07-29 | End: 2019-07-30 | Stop reason: HOSPADM

## 2019-07-29 RX ORDER — OXYCODONE HYDROCHLORIDE 5 MG/1
15 TABLET ORAL ONCE
Status: COMPLETED | OUTPATIENT
Start: 2019-07-29 | End: 2019-07-29

## 2019-07-29 RX ORDER — BACITRACIN, NEOMYCIN, POLYMYXIN B 400; 3.5; 5 [USP'U]/G; MG/G; [USP'U]/G
1 OINTMENT TOPICAL ONCE
Status: COMPLETED | OUTPATIENT
Start: 2019-07-29 | End: 2019-07-29

## 2019-07-29 RX ORDER — DIVALPROEX SODIUM 500 MG/1
500 TABLET, EXTENDED RELEASE ORAL 2 TIMES DAILY
Status: ON HOLD | COMMUNITY
End: 2019-07-30 | Stop reason: SDUPTHER

## 2019-07-29 RX ORDER — ACETAMINOPHEN 160 MG
2000 TABLET,DISINTEGRATING ORAL DAILY
COMMUNITY

## 2019-07-29 RX ADMIN — OXYCODONE HYDROCHLORIDE 5 MG: 5 TABLET ORAL at 22:14

## 2019-07-29 RX ADMIN — DIVALPROEX SODIUM 1000 MG: 250 TABLET, DELAYED RELEASE ORAL at 09:17

## 2019-07-29 RX ADMIN — NICOTINE 14 MG: 14 PATCH TRANSDERMAL at 11:31

## 2019-07-29 RX ADMIN — OXYCODONE HYDROCHLORIDE 15 MG: 5 TABLET ORAL at 05:21

## 2019-07-29 RX ADMIN — BACITRACIN, NEOMYCIN, POLYMYXIN B 1 SMALL APPLICATION: 400; 3.5; 5 OINTMENT TOPICAL at 02:32

## 2019-07-29 RX ADMIN — OXYCODONE HYDROCHLORIDE 5 MG: 5 TABLET ORAL at 16:52

## 2019-07-29 RX ADMIN — OXYCODONE HYDROCHLORIDE 10 MG: 10 TABLET, FILM COATED, EXTENDED RELEASE ORAL at 09:17

## 2019-07-29 RX ADMIN — DOCUSATE SODIUM 100 MG: 100 CAPSULE, LIQUID FILLED ORAL at 11:31

## 2019-07-29 RX ADMIN — MIDAZOLAM HYDROCHLORIDE 1.5 MG: 1 INJECTION, SOLUTION INTRAMUSCULAR; INTRAVENOUS at 02:32

## 2019-07-29 NOTE — ED PROVIDER NOTES
H&P Exam - Trauma   Frances Del Valle 60 y o  male MRN: [id number]  Unit/Bed#: [address] 497/5 -* Encounter: [id number]    Assessment/Plan   Trauma Alert: Trauma Acuity: Trauma Evaluation  Model of Arrival:   via    Trauma Team: Current Providers  Attending Provider: Kathrin Sweeney DO  Attending Provider: Rosy Shultz MD  Registered Nurse: Korin Theodore RN  Registered Nurse: Rusty Frost RN  Registered Nurse: Jenny Ng RN  Unit Clerk: Arielle Colunga  Registered Nurse: Jenny Ng RN  : JEAN Siddiqui  Registered Nurse: Fred Nolasco RN  Consulting Physician: Moira Chavez DO  Registered Nurse: Tera Mendez RN  Registered Nurse: Tammy Calle RN  Patient Care Assistant: Oliver Boone  Patient Care Assistant: Loni Ma  Patient Care Assistant: Loni Ma  Care Manager: Mark Rivera RN  Patient Care Assistant: Divya Peoples  Registered Nurse: Fred Nolasco RN  Occupational Therapist: Berta Valenzuela OT  : JEAN Siddiqui  Consultants: None    Trauma Active Problems:  Ground level fall    Trauma Plan:  Assessment, CT scan, EKG, labs and reassessment    Chief Complaint:   Chief Complaint   Patient presents with    Dizziness     Pt states that he has been having an increase in falls since yesterday  He states that he fell twice yesterday and once today  Pt states that he is standing and gets dizzy  Denies LOC  History of Present Illness   HPI:  Frances Del Valle is a 60 y o  male who presents with multiple recent falls and loss of  Mechanism:Details of Incident: Pt states that when he is standing he gets dizzy  2 falls yesterday  one fall today  Hit head on baseboard of bed  Injury Date: 07/29/19 Injury Time: 1800      This 26-year-old man with history of TBI and seizure disorder is a very poor historian  He states he has been off balance on 7/27/19 and 7/28/19 and has fallen multiple times    This evening he suddenly became weak and fell into the bathtub  He struck the right ear  There was no loss of consciousness  Medics state that his landlord corroborated his history  He is unsure if his legs become week or if he just gets off balance  He denies headache, chest pain, palpitations, incontinence, dyspnea and focal neurologic changes  His chronic back pain but feels may have injured his lower back on the last fall this evening  Patient states he increased his dose of phenytoin over the last month or so and has not had the serum level repeated  Review of Systems   Constitutional: Negative  HENT: Negative  Eyes: Negative  Respiratory: Negative  Cardiovascular: Negative  Gastrointestinal: Negative  Endocrine: Negative  Genitourinary: Negative  Musculoskeletal: Positive for back pain, gait problem and myalgias  Skin: Negative  Allergic/Immunologic: Negative  Neurological: Positive for speech difficulty  Negative for dizziness, facial asymmetry, numbness and headaches  Hematological: Negative  Psychiatric/Behavioral: Positive for agitation and confusion  Negative for self-injury  All other systems reviewed and are negative  Historical Information     Immunizations: There is no immunization history on file for this patient  Past Medical History:   Diagnosis Date    Anxiety     Back pain     History of herniated intervertebral disc     Seizures (HCC)     TBI (traumatic brain injury) ([facility] [address] )      History reviewed  No pertinent family history    Past Surgical History:   Procedure Laterality Date    BACK SURGERY      BRAIN SURGERY      LEG SURGERY         Social History     Socioeconomic History    Marital status: Single     Spouse name: None    Number of children: None    Years of education: None    Highest education level: None   Occupational History    None   Social Needs    Financial resource strain: None    Food insecurity:     Worry: None     Inability: None    Transportation needs:     Medical: None     Non-medical: None   Tobacco Use    Smoking status: Current Every Day Smoker     Packs/day: 1 00     Types: Cigarettes    Smokeless tobacco: Current User    Tobacco comment: 14 years   Substance and Sexual Activity    Alcohol use: Never     Frequency: Never     Binge frequency: Never    Drug use: No    Sexual activity: None   Lifestyle    Physical activity:     Days per week: None     Minutes per session: None    Stress: None   Relationships    Social connections:     Talks on phone: None     Gets together: None     Attends Jainism service: None     Active member of club or organization: None     Attends meetings of clubs or organizations: None     Relationship status: None    Intimate partner violence:     Fear of current or ex partner: None     Emotionally abused: None     Physically abused: None     Forced sexual activity: None   Other Topics Concern    None   Social History Narrative    None       Family History: non-contributory    Meds/Allergies   Prior to Admission Medications   Prescriptions Last Dose Informant Patient Reported? Taking?    Cholecalciferol (VITAMIN D3) 2000 units capsule   Yes Yes   Sig: Take 2,000 Units by mouth daily   LORazepam (ATIVAN) 1 mg tablet   Yes No   Sig: Take 0 5 mg by mouth every 8 (eight) hours as needed for anxiety   divalproex sodium (DEPAKOTE ER) 500 mg 24 hr tablet   Yes Yes   Sig: Take 500 mg by mouth 2 (two) times a day   divalproex sodium (DEPAKOTE) 250 mg EC tablet   Yes No   Sig: Take 250 mg by mouth every evening    ibuprofen (MOTRIN) 800 mg tablet   Yes No   Sig: Take 800 mg by mouth every 8 (eight) hours as needed for mild pain   oxyCODONE (OxyCONTIN) 15 mg 12 hr tablet   Yes No   Sig: Take 15 mg by mouth 6 (six) times a day    oxyCODONE (ROXICODONE) 15 mg immediate release tablet   Yes Yes   Sig: Take 15 mg by mouth 6 (six) times a day   phenytoin extended (DILANTIN) 300 MG ER capsule   Yes No   Sig: Take 100 mg by mouth daily    traMADol (ULTRAM) 50 mg tablet   No No   Sig: Take 1 tablet by mouth every 6 (six) hours as needed for moderate pain for up to 20 doses      Facility-Administered Medications: None       Allergies   Allergen Reactions    Penicillins Anaphylaxis    Amoxicillin        PHYSICAL EXAM    PE limited by:  Mild cognitive dysfunction, back pain and ataxia    Objective   Vitals:   First set: Temperature: 98 3 °F (36 8 °C) (07/28/19 2359)  Pulse: 79 (07/28/19 2352)  Respirations: 18 (07/28/19 2352)  Blood Pressure: 143/79 (07/28/19 2352)  SpO2: 97 % (07/28/19 2352)    Primary Survey:   (A) Airway:  Normal vocalizations  (B) Breathing:  Symmetric air entry and chest rise  (C) Circulation: Pulses:   normal  (D) Disabliity:  GCS Total:  15  (E) Expose:  Completed    Secondary Survey: (Click on Physical Exam tab above)  Physical Exam   Constitutional: He is oriented to person, place, and time  He appears well-developed  No distress  Cachectic   HENT:   Head: Normocephalic  Right Ear: External ear normal    Left Ear: External ear normal    Mouth/Throat: Oropharynx is clear and moist    Abrasion of right pinna   Eyes: Pupils are equal, round, and reactive to light  Conjunctivae and EOM are normal    Neck: Normal range of motion  Neck supple  No JVD present  Cardiovascular: Normal rate, regular rhythm, normal heart sounds and intact distal pulses  No murmur heard  Pulmonary/Chest: Effort normal and breath sounds normal  He exhibits no tenderness  Abdominal: Soft  Bowel sounds are normal  He exhibits no mass  There is no tenderness  There is no rebound and no guarding  Musculoskeletal: Normal range of motion  He exhibits tenderness (Sacral area without deformity, edema or ecchymosis)  He exhibits no edema  Lymphadenopathy:     He has no cervical adenopathy  Neurological: He is alert and oriented to person, place, and time  He has normal reflexes  No cranial nerve deficit or sensory deficit   He exhibits normal muscle tone  Coordination abnormal    Patient comes week and ataxic when standing  Unable to test gait due to imbalance   Skin: Skin is warm and dry  Capillary refill takes less than 2 seconds  No rash noted  He is not diaphoretic  Psychiatric: He has a normal mood and affect  His behavior is normal    Nursing note and vitals reviewed        Invasive Devices     None                 Lab Results:   Results Reviewed     Procedure Component Value Units Date/Time    Troponin I [[id number]]  (Abnormal) Collected:  07/29/19 0514    Lab Status:  Final result Specimen:  Blood from Arm, Right Updated:  07/29/19 0656     Troponin I 0 05 ng/mL     Phenytoin level, total [[id number]]  (Abnormal) Collected:  07/29/19 0034    Lab Status:  Final result Specimen:  Blood from Arm, Left Updated:  07/29/19 5616     Phenytoin Lvl 37 1 ug/mL     Valproic acid level, total [[id number]]  (Normal) Collected:  07/29/19 0034    Lab Status:  Final result Specimen:  Blood from Arm, Left Updated:  07/29/19 0601     Valproic Acid, Total 94 ug/mL     UA w Reflex to Microscopic [[id number]]  (Abnormal) Collected:  07/29/19 0411    Lab Status:  Final result Specimen:  Urine, Clean Catch Updated:  07/29/19 0439     Color, UA Yellow     Clarity, UA Clear     Specific Gravity, UA 1 025     pH, UA 6 0     Leukocytes, UA Negative     Nitrite, UA Negative     Protein, UA Negative mg/dl      Glucose, UA Negative mg/dl      Ketones, UA 15 (1+) mg/dl      Urobilinogen, UA 0 2 E U /dl      Bilirubin, UA Negative     Blood, UA Negative    Troponin I [[id number]]  (Abnormal) Collected:  07/29/19 0038    Lab Status:  Final result Specimen:  Blood from Arm, Left Updated:  07/29/19 0129     Troponin I 0 07 ng/mL     Protime-INR [[id number]]  (Normal) Collected:  07/29/19 0034    Lab Status:  Final result Specimen:  Blood from Arm, Left Updated:  07/29/19 0127     Protime 13 4 seconds      INR 1 05    APTT [[id number]]  (Normal) Collected:  07/29/19 0034    Lab Status:  Final result Specimen:  Blood from Arm, Left Updated:  07/29/19 0127     PTT 29 seconds     Basic metabolic panel [[id number]] Collected:  07/29/19 0034    Lab Status:  Final result Specimen:  Blood from Arm, Left Updated:  07/29/19 0122     Sodium 140 mmol/L      Potassium 3 6 mmol/L      Chloride 102 mmol/L      CO2 29 mmol/L      ANION GAP 9 mmol/L      BUN 17 mg/dL      Creatinine 0 67 mg/dL      Glucose 137 mg/dL      Calcium 9 0 mg/dL      eGFR 104 ml/min/1 73sq m     Narrative:       [place] guidelines for Chronic Kidney Disease (CKD):     Stage 1 with normal or high GFR (GFR > 90 mL/min/1 73 square meters)    Stage 2 Mild CKD (GFR = 60-89 mL/min/1 73 square meters)    Stage 3A Moderate CKD (GFR = 45-59 mL/min/1 73 square meters)    Stage 3B Moderate CKD (GFR = 30-44 mL/min/1 73 square meters)    Stage 4 Severe CKD (GFR = 15-29 mL/min/1 73 square meters)    Stage 5 End Stage CKD (GFR <15 mL/min/1 73 square meters)  Note: GFR calculation is accurate only with a steady state creatinine    CBC and differential [[id number]]  (Abnormal) Collected:  07/29/19 0034    Lab Status:  Final result Specimen:  Blood from Arm, Left Updated:  07/29/19 0110     WBC 5 76 Thousand/uL      RBC 4 10 Million/uL      Hemoglobin 13 8 g/dL      Hematocrit 40 4 %      MCV 99 fL      MCH 33 7 pg      MCHC 34 2 g/dL      RDW 12 2 %      MPV 10 3 fL      Platelets 124 Thousands/uL      nRBC 0 /100 WBCs      Neutrophils Relative 72 %      Immat GRANS % 1 %      Lymphocytes Relative 14 %      Monocytes Relative 13 %      Eosinophils Relative 0 %      Basophils Relative 0 %      Neutrophils Absolute 4 13 Thousands/µL      Immature Grans Absolute 0 03 Thousand/uL      Lymphocytes Absolute 0 80 Thousands/µL      Monocytes Absolute 0 77 Thousand/µL      Eosinophils Absolute 0 01 Thousand/µL      Basophils Absolute 0 02 Thousands/µL                  Imaging Studies:   Direct to CT: No  CT spine lumbar without contrast   Final Result by Clifford Pickens MD (07/29 0753)      Normal computed tomography of the lumbar spine  Workstation performed: [id number]         XR chest 1 view portable   ED Interpretation by Taqueria Sousa DO (07/29 2492)   No acute pathology      Final Result by Jie Hercules MD (07/29 1713)      No acute cardiopulmonary disease  Workstation performed: [id number]         CT cervical spine without contrast   Final Result by Clifford Pickens MD (07/29 0124)      No cervical spine fracture or traumatic malalignment  Workstation performed: [id number]         CT head without contrast   Final Result by Clifford Pickens MD (07/29 0129)      No acute intracranial abnormality  Workstation performed: [id number]             Other Studies:  None    Code Status: Prior  Advance Directive and Living Will:      Power of :    POLST:      Procedures  Procedures         ED Course  ED Course as of Jul 31 2322 Mon Jul 29, 2019   0451 Second EKG at 4:13 a m   Shows no acute change            MDM  Number of Diagnoses or Management Options  Abrasion of right pinna, initial encounter: new and does not require workup  Ambulatory dysfunction: new and requires workup  Multiple falls: new and requires workup  Phenytoin toxicity, accidental or unintentional, initial encounter: new and requires workup     Amount and/or Complexity of Data Reviewed  Clinical lab tests: ordered and reviewed  Tests in the radiology section of CPT®: ordered and reviewed  Review and summarize past medical records: yes  Discuss the patient with other providers: yes  Independent visualization of images, tracings, or specimens: yes        Disposition  [facility] Transfer: No  Final diagnoses:   Phenytoin toxicity, accidental or unintentional, initial encounter   Ambulatory dysfunction   Multiple falls   Abrasion of right pinna, initial encounter     Time reflects when diagnosis was documented in both MDM as applicable and the Disposition within this note     Time User Action Codes Description Comment    7/29/2019  6:50 AM Zelda Lights Add [T42 0X1A] Phenytoin toxicity, accidental or unintentional, initial encounter     7/29/2019  6:50 AM Zelda Lights Add [R26 2] Ambulatory dysfunction     7/29/2019  6:50 AM Zelda Lights Add [R29 6] Multiple falls     7/29/2019  6:50 AM Zelda Lights Add [S00 411A] Abrasion of right pinna, initial encounter     7/29/2019 10:45 AM Shanti Gather Add [Q64 400] H/O traumatic brain injury     7/29/2019 10:45 AM Shanti Gather Modify [A37 326] H/O traumatic brain injury     7/29/2019 10:45 AM Shanti Gather Add [G40 909] Seizure disorder ([address] )     7/29/2019 10:45 AM Shanti Gather Add [R27 0] Ataxia     7/30/2019 10:46 AM Shanti Gather Add [F11 20] Opiate dependence, continuous [facility])       ED Disposition     ED Disposition Condition Date/Time Comment    Admit Stable Mon Jul 29, 2019  6:49 AM Case was discussed with night practitioner and the patient's admission status was agreed to be Admission Status: observation status to the service of Dr Boyd Michael        Follow-up Information     Follow up With Specialties Details Why Contact Info    Carlos Katz DO Family Medicine Follow up in 1 week(s)  [address] [facility]      Lydia Villalobos PA-C Neurology, Physician Assistant Call in 3 week(s)  [phone]      [facility] Neurology Call in 3 week(s)  [facility] [address] [facility]  Follow up  [address] [address]  [phone]          Discharge Medication List as of 7/30/2019 12:19 PM      CONTINUE these medications which have CHANGED    Details   divalproex sodium (DEPAKOTE ER) 500 mg 24 hr tablet Patient takes 2 tablets in the morning, 3 tablets in the evening , No Print         CONTINUE these medications which have NOT CHANGED    Details   Cholecalciferol (VITAMIN D3) 2000 units capsule Take 2,000 Units by mouth daily, Historical Med      ibuprofen (MOTRIN) 800 mg tablet Take 800 mg by mouth every 8 (eight) hours as needed for mild pain, Historical Med      LORazepam (ATIVAN) 1 mg tablet Take 0 5 mg by mouth every 8 (eight) hours as needed for anxiety, Historical Med      oxyCODONE (ROXICODONE) 15 mg immediate release tablet Take 15 mg by mouth 6 (six) times a day, Historical Med         STOP taking these medications       divalproex sodium (DEPAKOTE) 250 mg EC tablet Comments:   Reason for Stopping:         oxyCODONE (OxyCONTIN) 15 mg 12 hr tablet Comments:   Reason for Stopping:         phenytoin extended (DILANTIN) 300 MG ER capsule Comments:   Reason for Stopping:         traMADol (ULTRAM) 50 mg tablet Comments:   Reason for Stopping:             Outpatient Discharge Orders   Durable Medical Equipment     Phenytoin level, total   Standing Status: Future Standing Exp  Date: 07/29/20     Comprehensive metabolic panel   Standing Status: Future Standing Exp  Date: 07/29/20     Valproic acid level, total   Standing Status: Future Standing Exp  Date: 07/30/20     Phenytoin level, total   Standing Status: Future Standing Exp  Date: 07/30/20     Ambulatory Referral to Home Health   Standing Status: Future Standing Exp   Date: 01/30/20      Discharge Diet     Discharge Diet     No strenuous exercise     No dressing needed     No strenuous exercise     Call provider for:  persistent dizziness or light-headedness     Call provider for:  extreme fatigue         ED Provider  Electronically Signed by         Liana Almanza DO  07/31/19 3819

## 2019-07-29 NOTE — ASSESSMENT & PLAN NOTE
· Patient reports level checked approximately 4 weeks ago and was low, so dose was increased by an additional 100mg daily (according to patient)  · Taking as prescribed since increased dosage   · Dilantin Level elevated at 37 1 this am  · Continue to hold and recheck level in am

## 2019-07-29 NOTE — PLAN OF CARE
Problem: Potential for Falls  Goal: Patient will remain free of falls  Description  INTERVENTIONS:  - Assess patient frequently for physical needs  -  Identify cognitive and physical deficits and behaviors that affect risk of falls    -  [place] fall precautions as indicated by assessment   - Educate patient/family on patient safety including physical limitations  - Instruct patient to call for assistance with activity based on assessment  - Modify environment to reduce risk of injury  - Consider OT/PT consult to assist with strengthening/mobility  Outcome: Progressing     Problem: NEUROSENSORY - ADULT  Goal: Achieves maximal functionality and self care  Description  INTERVENTIONS  - Monitor swallowing and airway patency with patient fatigue and changes in neurological status  - Encourage and assist patient to increase activity and self care with guidance from rehab services  - Encourage visually impaired, hearing impaired and aphasic patients to use assistive/communication devices  Outcome: Progressing     Problem: SKIN/TISSUE INTEGRITY - ADULT  Goal: Skin integrity remains intact  Description  INTERVENTIONS  - Identify patients at risk for skin breakdown  - Assess and monitor skin integrity  - Assess and monitor nutrition and hydration status  - Monitor labs (i e  albumin)  - Assess for incontinence   - Turn and reposition patient  - Assist with mobility/ambulation  - Relieve pressure over bony prominences  - Avoid friction and shearing  - Provide appropriate hygiene as needed including keeping skin clean and dry  - Evaluate need for skin moisturizer/barrier cream  - Collaborate with interdisciplinary team (i e  Nutrition, Rehabilitation, etc )   - Patient/family teaching  Outcome: Progressing     Problem: PAIN - ADULT  Goal: Verbalizes/displays adequate comfort level or baseline comfort level  Description  Interventions:  - Encourage patient to monitor pain and request assistance  - Assess pain using appropriate pain scale  - Administer analgesics based on type and severity of pain and evaluate response  - Implement non-pharmacological measures as appropriate and evaluate response  - Consider cultural and social influences on pain and pain management  - Notify physician/advanced practitioner if interventions unsuccessful or patient reports new pain  Outcome: Progressing     Problem: SAFETY ADULT  Goal: Maintain or return to baseline ADL function  Description  INTERVENTIONS:  -  Assess patient's ability to carry out ADLs; assess patient's baseline for ADL function and identify physical deficits which impact ability to perform ADLs (bathing, care of mouth/teeth, toileting, grooming, dressing, etc )  - Assess/evaluate cause of self-care deficits   - Assess range of motion  - Assess patient's mobility; develop plan if impaired  - Assess patient's need for assistive devices and provide as appropriate  - Encourage maximum independence but intervene and supervise when necessary  ¯ Involve family in performance of ADLs  ¯ Assess for home care needs following discharge   ¯ Request OT consult to assist with ADL evaluation and planning for discharge  ¯ Provide patient education as appropriate  Outcome: Progressing  Goal: Maintain or return mobility status to optimal level  Description  INTERVENTIONS:  - Assess patient's baseline mobility status (ambulation, transfers, stairs, etc )    - Identify cognitive and physical deficits and behaviors that affect mobility  - Identify mobility aids required to assist with transfers and/or ambulation (gait belt, sit-to-stand, lift, walker, cane, etc )  - [place] fall precautions as indicated by assessment  - Record patient progress and toleration of activity level on Mobility SBAR; progress patient to next Phase/Stage  - Instruct patient to call for assistance with activity based on assessment  - Request Rehabilitation consult to assist with strengthening/weightbearing, etc   Outcome: Progressing     Problem: DISCHARGE PLANNING  Goal: Discharge to home or other facility with appropriate resources  Description  INTERVENTIONS:  - Identify barriers to discharge w/patient and caregiver  - Arrange for needed discharge resources and transportation as appropriate  - Identify discharge learning needs (meds, wound care, etc )  - Arrange for interpretive services to assist at discharge as needed  - Refer to Case Management Department for coordinating discharge planning if the patient needs post-hospital services based on physician/advanced practitioner order or complex needs related to functional status, cognitive ability, or social support system  Outcome: Progressing     Problem: Knowledge Deficit  Goal: Patient/family/caregiver demonstrates understanding of disease process, treatment plan, medications, and discharge instructions  Description  Complete learning assessment and assess knowledge base    Interventions:  - Provide teaching at level of understanding  - Provide teaching via preferred learning methods  Outcome: Progressing     Problem: Prexisting or High Potential for Compromised Skin Integrity  Goal: Skin integrity is maintained or improved  Description  INTERVENTIONS:  - Identify patients at risk for skin breakdown  - Assess and monitor skin integrity  - Assess and monitor nutrition and hydration status  - Monitor labs (i e  albumin)  - Assess for incontinence   - Turn and reposition patient  - Assist with mobility/ambulation  - Relieve pressure over bony prominences  - Avoid friction and shearing  - Provide appropriate hygiene as needed including keeping skin clean and dry  - Evaluate need for skin moisturizer/barrier cream  - Collaborate with interdisciplinary team (i e  Nutrition, Rehabilitation, etc )   - Patient/family teaching  Outcome: Progressing

## 2019-07-29 NOTE — H&P
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, with Seizure history, maintained on Dilantin and Depakote  · Dilantin level noted to be elevated at 37 1, as ataxic with nystagmus suspect 2/2 dilantin elevation  · Depakote level normal  · Will continue to hold dilantin and recheck level in am  · Although patient VTE risk is low, in setting of ataxia with dilantin toxicity patient unsafe to ambulate, will place on chemical PPx for now    Accidental phenytoin poisoning  Assessment & Plan  · Patient reports level checked approximately 4 weeks ago and was low, so dose was increased by an additional 100mg daily (according to patient)  · Taking as prescribed since increased dosage   · Dilantin Level elevated at 37 1 this am  · Continue to hold and recheck level in am    Seizure disorder ([facility] [address] )  Assessment & Plan  · SZ history as result of TBI 15 years ago  · Maintained on Depakote, level normal today, will continue on admission  · Maintained on Dilantin, dosage increased approximately 1 month ago, patient taking as prescribed since increase, level elevated today at 37 1, will hold at this time  · Maintain Seizure precautions    VTE Prophylaxis: Enoxaparin (Lovenox)  / sequential compression device   Code Status: Level 1 Full Code  POLST: POLST form is not discussed and not completed at this time  Discussion with family: Discussed with patient, no family at bedside    Anticipated Length of Stay:  Patient will be admitted on an Observation basis with an anticipated length of stay of  > 2 midnights     Justification for Hospital Stay: Dilantin toxicity    Total Time for Visit, including Counseling / Coordination of Care: 35 minutes  Greater than 50% of this total time spent on direct patient counseling and coordination of care  Chief Complaint:   "I keep falling"    History of Present Illness:    Daniel Werner is a 60 y o  male who presented to [facility] ER this am after increasing falls  Patient reports that he has a significant PMH of TBI 15 years ago after fall from 25 feet, Seizure disorder as result maintained on Dilantin and Depakote  Patient reports approximately 4 weeks ago his Dilantin level which was obtained resulted as low and his dilantin was increased by an additional 100mg daily  He has been taking as prescribed  Since Saturday patient reports not "feeling right in the head "  He states he was at [facility] falling x2 while in the store  Throughout the weekend and this am, he reports having unsteadiness on his feet and additional falls including falling into the tub as well as his bed striking his head  There was no reported loss of consciousness  Patient denies headaches; however reports pain about his back  Workup in the ER included Trauma workup  CT Head/CSpine/LSpine negative for acute injuries  CXR also obtained which revealed no active cardiopulmonary disease  Labwork essentially unremarkable except for elevated dilantin level of 37 1  Presented to medical team for admission  Review of Systems:    Review of Systems   Constitutional: Negative for activity change, appetite change, chills, diaphoresis, fatigue and fever  HENT: Negative  Eyes: Negative for visual disturbance  Respiratory: Negative for shortness of breath  Cardiovascular: Negative for chest pain, palpitations and leg swelling  Gastrointestinal: Negative for abdominal pain, nausea and vomiting  Endocrine: Negative  Genitourinary: Negative for dysuria, frequency and urgency     Musculoskeletal: Positive for back pain and gait problem  Negative for myalgias, neck pain and neck stiffness  Skin: Positive for wound  Abrasion to left ear as well as contusion about left arm   Allergic/Immunologic: Negative  Neurological: Positive for dizziness  Negative for tremors, seizures, syncope, facial asymmetry, speech difficulty, weakness, light-headedness, numbness and headaches  Hematological: Negative  Psychiatric/Behavioral: Negative  Past Medical and Surgical History:     Past Medical History:   Diagnosis Date    Anxiety     Back pain     History of herniated intervertebral disc     Seizures (HCC)     TBI [facility]traumatic [facility] injury) ([facility] [address] )        Past Surgical History:   Procedure Laterality Date    BACK SURGERY      BRAIN SURGERY      LEG SURGERY         Meds/Allergies:    Prior to Admission medications    Medication Sig Start Date End Date Taking? Authorizing Provider   divalproex sodium (DEPAKOTE) 500 mg EC tablet Take 25,000 mg by mouth daily    Historical Provider, MD   ibuprofen (MOTRIN) 800 mg tablet Take 800 mg by mouth every 8 (eight) hours as needed for mild pain    Historical Provider, MD   LORazepam (ATIVAN) 1 mg tablet Take 0 5 mg by mouth every 8 (eight) hours as needed for anxiety    Historical Provider, MD   oxyCODONE (OxyCONTIN) 15 mg 12 hr tablet Take 10 mg by mouth every 4 (four) hours    Historical Provider, MD   phenytoin extended (DILANTIN) 300 MG ER capsule Take 300 mg by mouth daily    Historical Provider, MD   traMADol (ULTRAM) 50 mg tablet Take 1 tablet by mouth every 6 (six) hours as needed for moderate pain for up to 20 doses 11/21/17   Luis Enrique Esquivel MD     I have reviewed home medications with patient personally  Allergies:    Allergies   Allergen Reactions    Penicillins Anaphylaxis    Amoxicillin        Social History:     Marital Status: Single   Occupation: Disabled  Patient Pre-hospital Living Situation: Lives with "landloard"  Patient Pre-hospital Level of Mobility: performs own ADLs  Patient Pre-hospital Diet Restrictions: none  Substance Use History:   Social History     Substance and Sexual Activity   Alcohol Use No     Social History     Tobacco Use   Smoking Status Current Every Day Smoker    Packs/day: 1 00    Types: Cigarettes   Smokeless Tobacco Current User     Social History     Substance and Sexual Activity   Drug Use No       Family History:    non-contributory    Physical Exam:     Vitals:   Blood Pressure: 142/79 (07/29/19 0800)  Pulse: 79 (07/29/19 0800)  Temperature: 98 5 °F (36 9 °C) (07/29/19 0800)  Temp Source: Oral (07/29/19 0800)  Respirations: 20 (07/29/19 0800)  Height: 6' 1" (185 4 cm) (07/29/19 0800)  Weight - Scale: 65 9 kg (145 lb 4 5 oz) (07/29/19 0800)  SpO2: 95 % (07/29/19 0800)    Physical Exam    General: 61 y/o M in ER bed room 9  He is awake, alert, and oriented, non-toxic appearing  HEENT: noted prior skull deformity suspected from prior TBI, sclera anicteric, Pupils equal round and reactive; however noted nystagmus, conjunctiva pink, oropharynx patent, membranes dry, tolerating secretions  No facial droop or asymmetry  Tongue midline on protrusion  Neck: Supple, trachea midline  Heart: RRR without murmur, rub, or gallop  Lungs: clear and equal all fields bilaterally, no wheezing, rales, or rhonchi  Abd: SNT, no guarding, rebound, or peritoneal signs, active BS noted  : no berman  Ext: FROM of upper and lower extremities, resting tremor noted  Neuro: GCS 15, conscious, alert, and oriented, noted ataxia, nystagmus with dysmetria  Additional Data:     Lab Results: I have personally reviewed pertinent reports        Results from last 7 days   Lab Units 07/29/19  0034   WBC Thousand/uL 5 76   HEMOGLOBIN g/dL 13 8   HEMATOCRIT % 40 4   PLATELETS Thousands/uL 180   NEUTROS PCT % 72   LYMPHS PCT % 14   MONOS PCT % 13*   EOS PCT % 0     Results from last 7 days   Lab Units 07/29/19  0034   SODIUM mmol/L 140   POTASSIUM mmol/L 3 6   CHLORIDE mmol/L 102   CO2 mmol/L 29   BUN mg/dL 17   CREATININE mg/dL 0 67   ANION GAP mmol/L 9   CALCIUM mg/dL 9 0   GLUCOSE RANDOM mg/dL 137     Results from last 7 days   Lab Units 07/29/19  0034   INR  1 05                   Imaging: I have personally reviewed pertinent reports  and I have personally reviewed pertinent films in PACS    CT spine lumbar without contrast   Final Result by Kenneth Harding MD (07/29 1708)      Normal computed tomography of the lumbar spine  Workstation performed: [id number]         XR chest 1 view portable   ED Interpretation by Ruby Casas DO (07/29 4273)   No acute pathology      CT cervical spine without contrast   Final Result by Kenneth Harding MD (07/29 0124)      No cervical spine fracture or traumatic malalignment  Workstation performed: [id number]         CT head without contrast   Final Result by Kenneth Hardnig MD (07/29 0129)      No acute intracranial abnormality  Workstation performed: [id number]             EKG, Pathology, and Other Studies Reviewed on Admission:   · EKG: Telemetry monitor reviewed without acute events  No ST segment elevation or ectopy, will obtain 12 Lead EKG to evaluate QTc    Allscripts / Epic Records Reviewed: Yes     ** Please Note: This note has been constructed using a voice recognition system   **

## 2019-07-29 NOTE — CONSULTS
Consultation - Neurology   Misael Carvalho 60 y o  male MRN: [id number]  Unit/Bed#: [address]02 Encounter: [id number]      Assessment/Plan   1)  Dizziness- likely 2/2 phenytoin toxicity    -CT H with stable R temporal encephalomalacia chronic and stable from previous exams    -Dilantin held for now, will repeat level in 1 week  If level in not abnormally high, may restart 200mg PO QD (previous dosage)    -Supportive care and medication management per primary team   -Pt requests switch to [facility], apt requested    -No additional acute neurologic recommendations at this time, please call with questions     History of Present Illness     Reason for Consult / Principal Problem: 1  Hx of TBI 2  Seizure DO  Hx and PE limited by: None   HPI: Misael Carvalho is a 60 y o   male with a PMH of TBI and seizure disorder who presented to the [facility] ED with a CC of dizziness and ambulatory dysfunction with falls x 2 days  Of note, the pt recently has his phenytoin increased by 100mg over the past month and has not had blood levels checked  In the ED, dilantin level 37 1  Depakote level 94  BMP WNL  On exam today, the pt is resting comfortably in bed in no acute distress  He is polite and cooperative but mildly agitated and is perseverating on his OP Neurologist, repeatedly asking "why would he increase my meds so much?" 12 point ROS was completed and is negative  Neurological exam grossly non focal with exception of LUE weakness, likely sequelae of prior TBI         Inpatient consult to Neurology  Consult performed by: Johanna Bain PA-C  Consult ordered by: Joy Mccoy MD          Review of Systems   See HPI     Historical Information   Past Medical History:   Diagnosis Date    Anxiety     Back pain     History of herniated intervertebral disc     [facility] ([facility] [address] )     TBI (traumatic brain injury) ([facility] [address] )      Past Surgical History:   Procedure Laterality Date    [address] SURGERY      LEG SURGERY       Social History   Social History     Substance and Sexual Activity   Alcohol Use Never    Frequency: Never    Binge frequency: Never     Social History     Substance and Sexual Activity   Drug Use No     Social History     Tobacco Use   Smoking Status Current Every Day Smoker    Packs/day: 1 00    Types: Cigarettes   Smokeless Tobacco Current User   Tobacco Comment    14 years     Family History: non-contributory    Review of previous medical records was completed  Meds/Allergies   Scheduled Meds:  Current Facility-Administered Medications:  divalproex sodium 1,000 mg Oral Daily Skylar Brar PA-C   docusate sodium 100 mg Oral BID Julia Vuong MD   ibuprofen 800 mg Oral Q8H PRN Skylar Brar PA-C   LORazepam 0 5 mg Oral Q8H PRN Skylar Brar PA-C   nicotine 14 mg Transdermal Daily Julia Vuong MD   oxyCODONE 5 mg Oral Q4H PRN Julia Vuong MD   senna 1 tablet Oral HS Julia Vuong MD   traMADol 50 mg Oral Q6H PRN Skylar Brar PA-C     Continuous Infusions:   PRN Meds: ibuprofen    LORazepam    oxyCODONE    traMADol      Allergies   Allergen Reactions    Penicillins Anaphylaxis    Amoxicillin        Objective   Vitals:Blood pressure 142/79, pulse 79, temperature 98 5 °F (36 9 °C), temperature source Oral, resp  rate 20, height 6' 1" (1 854 m), weight 65 9 kg (145 lb 4 5 oz), SpO2 95 %  ,Body mass index is 19 17 kg/m²  No intake or output data in the 24 hours ending 07/29/19 1144    Invasive Devices: Invasive Devices     Peripheral Intravenous Line            Peripheral IV 07/29/19 Right Antecubital less than 1 day                Physical Exam   Constitutional:   Thin, no acute distress   HENT:   Right Ear: External ear normal    Left Ear: External ear normal    Mouth/Throat: Oropharynx is clear and moist  No oropharyngeal exudate     Post surgical changes noted right temporal area   Eyes: Conjunctivae are normal  Right eye exhibits no discharge  Left eye exhibits no discharge  No scleral icterus  Neck: Normal range of motion  Neck supple  Pulmonary/Chest: Effort normal  No respiratory distress  Musculoskeletal: Normal range of motion  He exhibits no edema, tenderness or deformity  Neurological: He has a normal Finger-Nose-Finger Test    Skin: Skin is warm and dry  No rash noted  He is not diaphoretic  No erythema  No pallor  Psychiatric:   Polite and appropriate, mildly agitated and perseverating on outpatient neurologist and medication changes   Nursing note and vitals reviewed  Neurologic Exam     Mental Status   Oriented to person  Oriented to place  Oriented to month  Follows 2 step commands  Attention: decreased  Concentration: decreased  Normal comprehension  Cranial Nerves     CN 2 through 12 grossly intact, patient does have rapid darting eye movements and horizontal nystagmus with far end gaze      Motor Exam   Muscle bulk: normal  Overall muscle tone: normal    Strength   Strength 5/5 except as noted  Left upper extremity 4/5,  5/5     Sensory Exam   Light touch normal      Gait, Coordination, and Reflexes     Gait  Gait: (Deferred for safety)    Coordination   Finger to nose coordination: normal    Tremor   Resting tremor: absent  Intention tremor: absent  Action tremor: absent      Lab Results: I have personally reviewed pertinent reports       Recent Results (from the past 24 hour(s))   CBC and differential    Collection Time: 07/29/19 12:34 AM   Result Value Ref Range    WBC 5 76 4 31 - 10 16 Thousand/uL    RBC 4 10 3 88 - 5 62 Million/uL    Hemoglobin 13 8 12 0 - 17 0 g/dL    Hematocrit 40 4 36 5 - 49 3 %    MCV 99 (H) 82 - 98 fL    MCH 33 7 26 8 - 34 3 pg    MCHC 34 2 31 4 - 37 4 g/dL    RDW 12 2 11 6 - 15 1 %    MPV 10 3 8 9 - 12 7 fL    Platelets 137 643 - 020 Thousands/uL    nRBC 0 /100 WBCs    Neutrophils Relative 72 43 - 75 %    Immat GRANS % 1 0 - 2 %    Lymphocytes Relative 14 14 - 44 % Monocytes Relative 13 (H) 4 - 12 %    Eosinophils Relative 0 0 - 6 %    Basophils Relative 0 0 - 1 %    Neutrophils Absolute 4 13 1 85 - 7 62 Thousands/µL    Immature Grans Absolute 0 03 0 00 - 0 20 Thousand/uL    Lymphocytes Absolute 0 80 0 60 - 4 47 Thousands/µL    Monocytes Absolute 0 77 0 17 - 1 22 Thousand/µL    Eosinophils Absolute 0 01 0 00 - 0 61 Thousand/µL    Basophils Absolute 0 02 0 00 - 0 10 Thousands/µL   Protime-INR    Collection Time: 07/29/19 12:34 AM   Result Value Ref Range    Protime 13 4 11 6 - 14 5 seconds    INR 1 05 0 84 - 1 19   APTT    Collection Time: 07/29/19 12:34 AM   Result Value Ref Range    PTT 29 23 - 37 seconds   Basic metabolic panel    Collection Time: 07/29/19 12:34 AM   Result Value Ref Range    Sodium 140 136 - 145 mmol/L    Potassium 3 6 3 5 - 5 3 mmol/L    Chloride 102 100 - 108 mmol/L    CO2 29 21 - 32 mmol/L    ANION GAP 9 4 - 13 mmol/L    BUN 17 5 - 25 mg/dL    Creatinine 0 67 0 60 - 1 30 mg/dL    Glucose 137 65 - 140 mg/dL    Calcium 9 0 8 3 - 10 1 mg/dL    eGFR 104 ml/min/1 73sq m   Phenytoin level, total    Collection Time: 07/29/19 12:34 AM   Result Value Ref Range    Phenytoin Lvl 37 1 (HH) 10 0 - 20 0 ug/mL   Valproic acid level, total    Collection Time: 07/29/19 12:34 AM   Result Value Ref Range    Valproic Acid, Total 94 50 - 100 ug/mL   Troponin I    Collection Time: 07/29/19 12:38 AM   Result Value Ref Range    Troponin I 0 07 (H) <=0 04 ng/mL   UA w Reflex to Microscopic    Collection Time: 07/29/19  4:11 AM   Result Value Ref Range    Color, UA Yellow     Clarity, UA Clear     Specific Gravity, UA 1 025 1 003 - 1 030    pH, UA 6 0 4 5, 5 0, 5 5, 6 0, 6 5, 7 0, 7 5, 8 0    Leukocytes, UA Negative Negative    Nitrite, UA Negative Negative    Protein, UA Negative Negative mg/dl    Glucose, UA Negative Negative mg/dl    Ketones, UA 15 (1+) (A) Negative mg/dl    Urobilinogen, UA 0 2 0 2, 1 0 E U /dl E U /dl    Bilirubin, UA Negative Negative    Blood, UA Negative Negative   Troponin I    Collection Time: 07/29/19  5:14 AM   Result Value Ref Range    Troponin I 0 05 (H) <=0 04 ng/mL   ]    Imaging Studies: I have personally reviewed pertinent reports  and I have personally reviewed pertinent films in PACS  EKG, Pathology, and Other Studies: I have personally reviewed pertinent reports      VTE Prophylaxis: Sequential compression device [place]     Code Status: Level 1 - Full Code  Advance Directive and Living Will:      Power of :    POLST:

## 2019-07-29 NOTE — ASSESSMENT & PLAN NOTE
· SZ history as result of TBI 15 years ago  · Maintained on Depakote, level normal today, will continue on admission  · Maintained on Dilantin, dosage increased approximately 1 month ago, patient taking as prescribed since increase, level elevated today at 37 1, will hold at this time  · Maintain Seizure precautions

## 2019-07-29 NOTE — UTILIZATION REVIEW
Initial Clinical Review    Admission: Date/Time/Statement: observation 07/29/19 0654  Orders Placed This Encounter   Procedures    Place in Observation (expected length of stay for this patient is less than two midnights)     Standing Status:   Standing     Number of Occurrences:   1     Order Specific Question:   Admitting Physician     Answer:   Ileana Vasquez [[id number]]     Order Specific Question:   Level of Care     Answer:   Med Surg [[id number]]     ED Arrival Information     Expected Arrival Acuity Means of Arrival Escorted By Service Admission Type    - 7/28/2019 23:46 Urgent Ambulance [facility]) General Medicine Urgent    [facility]        Chief Complaint   Patient presents with    Dizziness     Pt states that he has been having an increase in falls since yesterday  He states that he fell twice yesterday and once today  Pt states that he is standing and gets dizzy  Denies LOC  Assessment/Plan: 60 y o  male who presented to [address] ER this am after increasing falls  Patient reports that he has a significant PMH of TBI 15 years ago after fall from 25 feet, Seizure disorder as result maintained on Dilantin and Depakote  Patient reports approximately 4 weeks ago his Dilantin level which was obtained resulted as low and his dilantin was increased by an additional 100mg daily  He has been taking as prescribed  Since Saturday patient reports not "feeling right in the head "  He states he was at [facility] falling x2 while in the store  Throughout the weekend and this am, he reports having unsteadiness on his feet and additional falls including falling into the tub as well as his bed striking his head  There was no reported loss of consciousness    Patient denies headaches; however reports pain about his back  Ataxia  Assessment & Plan  · Presented with unsteadiness on feet / ataxia  · Initial worked up as a trauma secondary to multiple falls  · Trauma workup negative including CT Head, CSpine, LSpine and CXR  · Prior TBI 15 years ago, with Seizure history, maintained on Dilantin and Depakote  · Dilantin level noted to be elevated at 37 1, as ataxic with nystagmus suspect 2/2 dilantin elevation  · Depakote level normal  · Will continue to hold dilantin and recheck level in am  · Although patient VTE risk is low, in setting of ataxia with dilantin toxicity patient unsafe to ambulate, will place on chemical PPx for now  Accidental phenytoin poisoning  Assessment & Plan  · Patient reports level checked approximately 4 weeks ago and was low, so dose was increased by an additional 100mg daily (according to patient)  · Taking as prescribed since increased dosage   · Dilantin Level elevated at 37 1 this am  · Continue to hold and recheck level in am  Seizure disorder [facility])  Assessment & Plan  · SZ history as result of TBI 15 years ago  · Maintained on Depakote, level normal today, will continue on admission  · Maintained on Dilantin, dosage increased approximately 1 month ago, patient taking as prescribed since increase, level elevated today at 37 1, will hold at this time  · Maintain Seizure precautions    7/29 Neurology note:  1)  Dizziness- likely 2/2 phenytoin toxicity               -CT H with stable R temporal encephalomalacia chronic and stable from previous exams               -Dilantin held for now, will repeat level in 1 week   If level in not abnormally high, may restart 200mg PO QD (previous dosage)               -Supportive care and medication management per primary team              -Pt requests switch to [facility], apt requested               -No additional acute neurologic recommendations at this time, please call with questions       ED Triage Vitals   Temperature Pulse Respirations Blood Pressure SpO2   07/28/19 2359 07/28/19 2352 07/28/19 2352 07/28/19 2352 07/28/19 2352   98 3 °F (36 8 °C) 79 18 143/79 97 %      Temp Source Heart Rate Source Patient Position - Orthostatic VS BP Location FiO2 (%)   07/28/19 2359 07/28/19 2352 07/28/19 2352 07/28/19 2352 --   Temporal Monitor Lying Right arm       Pain Score       07/28/19 2359       8        Wt Readings from Last 1 Encounters:   07/29/19 65 9 kg (145 lb 4 5 oz)     Additional Vital Signs:   07/29/19 0800  98 5 °F (36 9 °C)  79  20  142/79  95 %  None (Room air)  Lying   07/29/19 0715  --  87  16  136/75  99 %  --  --   07/29/19 0645  98 4 °F (36 9 °C)  82  19  132/79  98 %  --  --   07/29/19 0630  --  82  --  126/76  95 %  --  --   07/29/19 0615  --  83  18  125/74  96 %  --  --   07/29/19 0600  --  84  17  129/70  95 %  --  --   07/29/19 0530  --  82  18  162/76  98 %  --  --   07/29/19 0515  --  83  19  124/76  --  --  --   07/29/19 05:00:11  --  83  19  142/70  98 %  --  Lying   07/29/19 0415  --  82  18  132/78  97 %  --  --   07/29/19 0400  --  79  21  131/72  95 %  --  --   07/29/19 0345  --  80  21  133/76  96 %  --  --   07/29/19 0330  --  79  16  132/74  98 %  --  --   07/29/19 0315  --  83  18  122/67  98 %  --  --   07/29/19 0300  --  81  12  134/80  99 %  --  Lying   07/29/19 02:56:42  --  81  18  121/77  98 %  --  Lying   07/29/19 0230  --  80  18  133/75  98 %  --  Lying   07/29/19 0145  --  78  18  129/77  97 %  --  Lying   07/29/19 0130  --  77  18  117/67  98 %  --  Lying   07/29/19 01:00:10  --  81  32Abnormal   --  --  --  --   07/29/19 0100  --  86  16  123/75  --  --  Lying   07/29/19 0045  --  85  16  143/84  96 %  --  Lying   07/29/19 0030  --  75  18  135/75  100 %  --  Lying   07/29/19 0020  --  77  18  131/80  95 %  --  Lying   07/29/19 00:04:30  --  79  18  --  96 %  --  Lying   07/28/19 23:59:38  98 3 °F (36 8 °C)  80  18  123/76  95 %  --  --   07/28/19 2356  --  --  --  --  --  None (Room air)  --   07/28/19 2352  --  79  18  143/79  97 %  None (Room air)  Lying      Weights (last 14 days)     Date/Time  Weight  Height   07/29/19 0800  65 9 kg (145 lb 4 5 oz)  6' 1" (1 854 m) 07/28/19 2352  64 4 kg (142 lb)  6' 4" (1 93 m)          Pertinent Labs/Diagnostic Test Results:   Results from last 7 days   Lab Units 07/29/19  0034   WBC Thousand/uL 5 76   HEMOGLOBIN g/dL 13 8   HEMATOCRIT % 40 4   PLATELETS Thousands/uL 180   NEUTROS ABS Thousands/µL 4 13         Results from last 7 days   Lab Units 07/29/19  0034   SODIUM mmol/L 140   POTASSIUM mmol/L 3 6   CHLORIDE mmol/L 102   CO2 mmol/L 29   ANION GAP mmol/L 9   BUN mg/dL 17   CREATININE mg/dL 0 67   EGFR ml/min/1 73sq m 104   CALCIUM mg/dL 9 0             Results from last 7 days   Lab Units 07/29/19  0034   GLUCOSE RANDOM mg/dL 137     Results from last 7 days   Lab Units 07/29/19  0514 07/29/19  0038   TROPONIN I ng/mL 0 05* 0 07*         Results from last 7 days   Lab Units 07/29/19  0034   PROTIME seconds 13 4   INR  1 05   PTT seconds 29             Ref  Range 7/29/2019 00:30 7/29/2019 00:34   PHENYTOIN LEVEL (DILANTIN) Latest Ref Range: 10 0 - 20 0 ug/mL  37 1 (HH)   VALPROIC ACID LEVEL, TOTAL Unknown  Rpt   VALPROIC ACID TOTAL Latest Ref Range: 50 - 100 ug/mL  94             Results from last 7 days   Lab Units 07/29/19  0411   CLARITY UA  Clear   COLOR UA  Yellow   SPEC GRAV UA  1 025   PH UA  6 0   GLUCOSE UA mg/dl Negative   KETONES UA mg/dl 15 (1+)*   BLOOD UA  Negative   PROTEIN UA mg/dl Negative   NITRITE UA  Negative   BILIRUBIN UA  Negative   UROBILINOGEN UA E U /dl 0 2   LEUKOCYTES UA  Negative     CT spine lumbar without contrast   Normal computed tomography of the lumbar spine  XR chest 1 view portable   No acute pathology       CT cervical spine without contrast   No cervical spine fracture or traumatic malalignment  CT head without contrast   No acute intracranial abnormality          EKG, Pathology, and Other Studies Reviewed on Admission:   · EKG: Telemetry monitor reviewed without acute events   No ST segment elevation or ectopy, will obtain 12 Lead EKG to evaluate QTc    ED Treatment:   Medication Administration from 07/28/2019 2346 to 07/29/2019 0745       Date/Time Order Dose Route Action     07/29/2019 0232 neomycin-bacitracin-polymyxin b (NEOSPORIN) ointment 1 small application 1 small application Topical Given     07/29/2019 0232 midazolam (VERSED) injection 1 5 mg 1 5 mg Intravenous Given     07/29/2019 0521 oxyCODONE (ROXICODONE) IR tablet 15 mg 15 mg Oral Given        Past Medical History:   Diagnosis Date    Anxiety     Back pain     History of herniated intervertebral disc     Seizures (HCC)     TBI (traumatic brain injury) ([facility] [address] )      Present on Admission:   Ataxia   Accidental phenytoin poisoning   Seizure disorder ([facility] [address] )    Admitting Diagnosis: Dizziness [R42]  Multiple falls [R29 6]  Ambulatory dysfunction [R26 2]  Abrasion of right pinna, initial encounter [S00 411A]  Phenytoin toxicity, accidental or unintentional, initial encounter [T42 0X1A]  Age/Sex: 60 y o  male  Admission Orders:  24 hr telemetry  Up w assist  ambulate pt    Current Facility-Administered Medications:  divalproex sodium 1,000 mg Oral Daily   ibuprofen 800 mg Oral Q8H PRN   LORazepam 0 5 mg Oral Q8H PRN   oxyCODONE 10 mg Oral Q12H [facility]   traMADol 50 mg Oral Q6H PRN     Network Utilization Review Department  Phone: [phone]; Fax [phone]  [email or web address]  ATTENTION: Please call with any questions or concerns to [phone]  and carefully listen to the prompts so that you are directed to the right person  Send all requests for admission clinical reviews, approved or denied determinations and any other requests to fax [phone]   All voicemails are confidential

## 2019-07-30 VITALS
BODY MASS INDEX: 19.25 KG/M2 | OXYGEN SATURATION: 97 % | DIASTOLIC BLOOD PRESSURE: 78 MMHG | RESPIRATION RATE: 18 BRPM | HEART RATE: 77 BPM | HEIGHT: 73 IN | TEMPERATURE: 98.9 F | WEIGHT: 145.28 LBS | SYSTOLIC BLOOD PRESSURE: 153 MMHG

## 2019-07-30 LAB — PHENYTOIN SERPL-MCNC: 34.4 UG/ML (ref 10–20)

## 2019-07-30 PROCEDURE — 80185 ASSAY OF PHENYTOIN TOTAL: CPT | Performed by: INTERNAL MEDICINE

## 2019-07-30 PROCEDURE — G8979 MOBILITY GOAL STATUS: HCPCS

## 2019-07-30 PROCEDURE — G8987 SELF CARE CURRENT STATUS: HCPCS

## 2019-07-30 PROCEDURE — 97116 GAIT TRAINING THERAPY: CPT

## 2019-07-30 PROCEDURE — 97163 PT EVAL HIGH COMPLEX 45 MIN: CPT

## 2019-07-30 PROCEDURE — G8988 SELF CARE GOAL STATUS: HCPCS

## 2019-07-30 PROCEDURE — 97530 THERAPEUTIC ACTIVITIES: CPT

## 2019-07-30 PROCEDURE — G8980 MOBILITY D/C STATUS: HCPCS

## 2019-07-30 PROCEDURE — 99217 PR OBSERVATION CARE DISCHARGE MANAGEMENT: CPT | Performed by: INTERNAL MEDICINE

## 2019-07-30 PROCEDURE — G8978 MOBILITY CURRENT STATUS: HCPCS

## 2019-07-30 PROCEDURE — 97167 OT EVAL HIGH COMPLEX 60 MIN: CPT

## 2019-07-30 RX ORDER — OXYCODONE HYDROCHLORIDE 15 MG/1
15 TABLET, FILM COATED, EXTENDED RELEASE ORAL EVERY 12 HOURS SCHEDULED
Status: DISCONTINUED | OUTPATIENT
Start: 2019-07-30 | End: 2019-07-30

## 2019-07-30 RX ORDER — OXYCODONE HYDROCHLORIDE 15 MG/1
15 TABLET ORAL EVERY 4 HOURS PRN
Status: ON HOLD | COMMUNITY
End: 2020-08-15 | Stop reason: SDUPTHER

## 2019-07-30 RX ORDER — DIVALPROEX SODIUM 500 MG/1
TABLET, EXTENDED RELEASE ORAL
Refills: 0
Start: 2019-07-30

## 2019-07-30 RX ORDER — OXYCODONE HYDROCHLORIDE 15 MG/1
15 TABLET ORAL EVERY 6 HOURS PRN
Qty: 30 TABLET | Refills: 0
Start: 2019-07-30 | End: 2019-07-30 | Stop reason: HOSPADM

## 2019-07-30 RX ORDER — DIVALPROEX SODIUM 500 MG/1
1500 TABLET, DELAYED RELEASE ORAL
Status: DISCONTINUED | OUTPATIENT
Start: 2019-07-30 | End: 2019-07-30 | Stop reason: HOSPADM

## 2019-07-30 RX ORDER — DIVALPROEX SODIUM 250 MG/1
250 TABLET, DELAYED RELEASE ORAL EVERY EVENING
Refills: 0
Start: 2019-07-30 | End: 2019-07-30 | Stop reason: HOSPADM

## 2019-07-30 RX ORDER — DIVALPROEX SODIUM 500 MG/1
1500 TABLET, EXTENDED RELEASE ORAL DAILY
Refills: 0
Start: 2019-07-30 | End: 2019-07-30 | Stop reason: SDUPTHER

## 2019-07-30 RX ORDER — OXYCODONE HYDROCHLORIDE 5 MG/1
15 TABLET ORAL EVERY 6 HOURS PRN
Status: DISCONTINUED | OUTPATIENT
Start: 2019-07-30 | End: 2019-07-30 | Stop reason: HOSPADM

## 2019-07-30 RX ADMIN — DOCUSATE SODIUM 100 MG: 100 CAPSULE, LIQUID FILLED ORAL at 08:40

## 2019-07-30 RX ADMIN — OXYCODONE HYDROCHLORIDE 15 MG: 5 TABLET ORAL at 01:42

## 2019-07-30 RX ADMIN — DIVALPROEX SODIUM 1000 MG: 250 TABLET, DELAYED RELEASE ORAL at 08:40

## 2019-07-30 RX ADMIN — NICOTINE 14 MG: 14 PATCH TRANSDERMAL at 08:38

## 2019-07-30 RX ADMIN — OXYCODONE HYDROCHLORIDE 15 MG: 5 TABLET ORAL at 08:38

## 2019-07-30 RX ADMIN — DIVALPROEX SODIUM 1500 MG: 500 TABLET, DELAYED RELEASE ORAL at 01:42

## 2019-07-30 NOTE — DISCHARGE SUMMARY
DISCHARGE SUMMARY FOR DARELL Thompson     NAME: Misael Carvalho  AGE: 60 y o  SEX: male   MRN: [id number]   Encounter: [id number]   Unit/Bed: [address] [place] [address]     Admitting Provider:  Louann Scheuermann, MD  Discharge Provider:  Joy Mccoy MD  Admission Date: 7/28/2019       Discharge Date: 07/30/19   LOS: 0  Primary Care Physician at Discharge: Tiana Marroquin  [facility]  · Accidental phenytoin worsening  · Ataxia to accidental phenytoin poisoning  · Seizure disorder on antiepileptic drugs  · Chronic opiate dependence secondary to history of traumatic brain injury  · History of traumatic brain injury  · Non MI troponin elevation in the setting of multiple falls, phenytoin toxicity  · Chronic tobacco use  · History of chronic back pain  · History of herniated intervertebral disc    HOSPITAL COURSE:  Misael Carvalho is a 60 y o  male who presented to [address] [place] [address] ER on 07/28/2019 after increasing falls  Patient reports that he has a significant PMH of TBI 15 years ago after fall from 25 feet, Seizure disorder as result maintained on Dilantin and Depakote  Patient reports approximately 4 weeks ago his Dilantin level which was obtained resulted as low and his dilantin was increased by an additional 100mg daily  He has been taking as prescribed  Since Saturday patient reports not "feeling right in the head "  He states he was at [facility] falling x2 while in the store  Throughout the weekend and this am, he reports having unsteadiness on his feet and additional falls including falling into the tub as well as his bed striking his head  There was no reported loss of consciousness  Patient denies headaches; however reports pain about his back  Workup in the ER included Trauma workup  CT Head/CSpine/LSpine negative for acute injuries  CXR also obtained which revealed no active cardiopulmonary disease    Labwork essentially unremarkable except for elevated dilantin level of 37 1  Presented to medical team for admission  Below is the short summary of hospital stay:     Ataxia secondary to accidental phenytoin poisoning  Assessment & Plan  · Presented with unsteadiness on feet / ataxia  · Initial worked up as a trauma secondary to multiple falls  · Trauma workup negative including CT Head, CSpine, LSpine and CXR  · Prior TBI 15 years ago, with seizure history, maintained on Dilantin and Depakote  · Dilantin level noted to be elevated at 37 1, as ataxic with nystagmus suspect 2/2 dilantin elevation  · Condition much improved  Patient cleared by physical therapy to be discharged home with a rolling walker  · This will be arranged prior to him leaving the hospital   · Patient seen by Neurology, recommend outpatient follow-up  · Dilantin level on discharge 34  4  · No Dilantin on discharge  Repeat Dilantin level August 5, 2019  · Outpatient follow-up with Neurology regarding resumption of Dilantin          Accidental phenytoin poisoning  Assessment & Plan  · Patient reports level checked approximately 4 weeks ago and was low, so dose was increased by an additional 100mg daily (according to patient)  · Dilantin level on admission 37 1, on day of discharge 34  4  · No Dilantin on discharge  · Repeat Dilantin levels and CMP on August 5, 2019  · Follow-up with Neurology as outpatient regarding resumption of Dilantin        CHRONIC OPIOID DEPENDENCE  Assessment & Plan  · Patient is on chronic opiate therapy for history of TBI 15 years ago  · I called with patient's primary care physician office at [phone] -- as per nurse Sho Ortiz -- Patient has been getting oxycodone 15 mg orally every six hours on a daily basis  Patient is not on OxyContin  · PMED reviewed and patient prescribed oxycodone 15mg filled 06/28/19 for quantity 180, 30 day supply from [facility] in [facility] by Natacha Padilla [phone]   Patient then obtained another re-fill 07/09/19, 07/16/19, 07/23/19 for quantity 42, 7day supply on each of these dates from [address] in [facility]  PMED also lists OxyContin 50 mg PO q6H, but both patient and patient's primary care physician office mentions this is not the case  · Patient will not be receiving any opiates upon discharge  Seizure disorder [facility])  Assessment & Plan  · Seziure history as result of TBI 15 years ago  · Maintained on Depakote ER = 500 mg, 2 tablets in the morning  and 3 tablets in the evening  · No dilantin on discharge, refer above for details  · No driving  · Patient expressed desires to follow up with [address] [place] [address] Neurologic associates, contact information has been provided to patient  · His Neurology follow-up is usually with [facility] at [place], [place]  · The case was discussed with Neurology team -- Joanne Dolan:  He mentions that in addition to above dose of Depakote ER, he takes an extra 250 mg daily  · Patient however, is refusing to take this medication change  · [facility] aware of this and recommends to discharge on Depakote ER = 500 mg, 2 tablets in the morning  and 3 tablets in the evening  · In addition to Dilantin levels, Depakote level has been added to be done on August 5, 2019  · This will be followed by Joanne Dolan  CONSULTING PROVIDERS   · Neurology    PROCEDURES PERFORMED  Xr Chest 1 View Portable    Result Date: 7/29/2019  Narrative: CHEST INDICATION:   Ataxia  COMPARISON:  2/25/2019  EXAM PERFORMED/VIEWS:  XR CHEST PORTABLE FINDINGS: Cardiomediastinal silhouette appears unremarkable  The lungs are clear  No pneumothorax or pleural effusion  Chronic fracture deformity of the left clavicle  Impression: No acute cardiopulmonary disease  Workstation performed: [id number]     Ct Head Without Contrast    Result Date: 7/29/2019  Narrative: CT BRAIN - WITHOUT CONTRAST INDICATION:   Fall, closed head injury, ataxia  COMPARISON:  3/31/2018   TECHNIQUE:  CT examination of the brain was performed  In addition to axial images, coronal 2D reformatted images were created and submitted for interpretation  Radiation dose length product (DLP) for this visit:  987 25 mGy-cm   This examination, like all CT scans performed in the [facility], was performed utilizing techniques to minimize radiation dose exposure, including the use of iterative  reconstruction and automated exposure control  IMAGE QUALITY:  Diagnostic  FINDINGS: PARENCHYMA: Right MCA territory encephalomalacia unchanged     No intracranial mass, mass effect or midline shift  No CT signs of acute infarction  No acute parenchymal hemorrhage  VENTRICLES AND EXTRA-AXIAL SPACES:  Normal for the patient's age  VISUALIZED ORBITS AND PARANASAL SINUSES:  Unremarkable  CALVARIUM AND EXTRACRANIAL SOFT TISSUES:  Post right frontal parietotemporal craniotomy        Impression: No acute intracranial abnormality  Workstation performed: [id number]     Ct Cervical Spine Without Contrast    Result Date: 7/29/2019  Narrative: CT CERVICAL SPINE - WITHOUT CONTRAST INDICATION:   Fall, head injury, ataxia  COMPARISON:  None  TECHNIQUE:  CT examination of the cervical spine was performed without intravenous contrast   Contiguous axial images were obtained  Sagittal and coronal reconstructions were performed  Radiation dose length product (DLP) for this visit:  452 24 mGy-cm   This examination, like all CT scans performed in the [facility], was performed utilizing techniques to minimize radiation dose exposure, including the use of iterative  reconstruction and automated exposure control  IMAGE QUALITY:  Diagnostic  FINDINGS: ALIGNMENT:  Normal alignment of the cervical spine  No subluxation  VERTEBRAL BODIES:  No fracture  DEGENERATIVE CHANGES:  No significant cervical degenerative changes are noted  PREVERTEBRAL AND PARASPINAL SOFT TISSUES:  Unremarkable  THORACIC INLET:  Normal   Healed left clavicular fracture    Subpleural apical emphysematous changes  Impression: No cervical spine fracture or traumatic malalignment  Workstation performed: [id number]     Ct Spine Lumbar Without Contrast    Result Date: 7/29/2019  Narrative: CT LUMBAR SPINE INDICATION:   Low back pain with weakness of legs  COMPARISON: None  TECHNIQUE:  Contiguous axial images through the lumbar spine were obtained  Sagittal and coronal reconstructions were performed  Radiation dose length product (DLP) for this visit:  498 84 mGy-cm   This examination, like all CT scans performed in the [facility], was performed utilizing techniques to minimize radiation dose exposure, including the use of iterative  reconstruction and automated exposure control  IMAGE QUALITY:  Diagnostic  FINDINGS: ALIGNMENT:  Normal alignment of the lumbar spine  No spondylolisthesis or spondylolysis  VERTEBRAL BODIES:  No fracture  No lytic or blastic lesion  DEGENERATIVE CHANGES: Moderate multilevel degenerative changes  PARASPINAL SOFT TISSUES:   Normal      Impression: Normal computed tomography of the lumbar spine   Workstation performed: [id number]       PERTINENT LAB DATAS   7/29/2019 00:34 7/29/2019 00:38 7/29/2019 04:11 7/29/2019 05:14 7/30/2019 05:02   Sodium 140       Potassium 3 6       Chloride 102       Anion Gap 9       BUN 17       Creatinine 0 67       Glucose, Random 137       Calcium 9 0       eGFR 104       Troponin I  0 07 (H)  0 05 (H)    WBC 5 76       Red Blood Cell Count 4 10       Hemoglobin 13 8       HCT 40 4       MCV 99 (H)       MCH 33 7       MCHC 34 2       RDW 12 2       Platelet Count 225       MPV 10 3       nRBC 0       Neutrophils % 72       Immat GRANS % 1       Lymphocytes Relative 14       Monocytes Relative 13 (H)       Eosinophils 0       Basophils Relative 0       Immature Grans Absolute 0 03       Absolute Neutrophils 4 13       Lymphocytes Absolute 0 80       Absolute Monocytes 0 77       Absolute Eosinophils 0 01       Basophils Absolute 0 02       Protime 13 4       INR 1 05       PTT 29       PHENYTOIN LEVEL (DILANTIN) 37 1 (HH)    34 4 (HH)   VALPROIC ACID LEVEL, TOTAL Rpt       VALPROIC ACID TOTAL 94       Color, UA   Yellow     Clarity, UA   Clear     SL AMB SPECIFIC GRAVITY_URINE   1 025     Glucose, UA   Negative     Ketones, UA   15 (1+) (A)     Blood, UA   Negative     Nitrite, UA   Negative     Leukocytes, UA   Negative     pH, UA   6 0     POCT URINE PROTEIN   Negative     Bilirubin, UA   Negative     SL AMB POCT UROBILINOGEN   0 2           PHYSICAL EXAM:  Vitals:   Blood Pressure: 153/78 (07/30/19 0700)  Pulse: 77 (07/30/19 0700)  Temperature: 98 9 °F (37 2 °C) (07/30/19 0700)  Temp Source: Oral (07/30/19 0700)  Respirations: 18 (07/30/19 0700)  Height: 6' 1" (185 4 cm) (07/29/19 0800)  Weight - Scale: 65 9 kg (145 lb 4 5 oz) (07/29/19 0800)  SpO2: 97 % (07/30/19 0700)    GENERAL: AAO x 3  HEENT: atraumatic, normocephalic  Oral mucosa moist, no icterus, pallor  PERRLA +  Neck supple, no JVD, no lymphadenopathy, no thryomegaly  CHEST: B/L breath sounds heard, occasional wheezing  CVS: S1, S2  No cyanosis/clubbing or edema  ABDOMEN: Soft/obese/NT/BS heard  NEUROLOGICAL: CN II -XI grossly intact  No focal motor or sensory deficits  No signs of meningeal irritation or cerebellar dysfunction  EXTREMITIES: No cyanosis/clubbing or edema  Discharge Disposition: Home/Self Care    Test Results Pending at Discharge:  NA    Medications   Please see After Visit Summary for reconciled discharge medications provided to patient and family  Diet restrictions:       Diet Orders   (From admission, onward)             Start     Ordered    07/29/19 0755  Diet Regular; Regular House  Diet effective now     Question Answer Comment   Diet Type Regular    Regular Regular House    RD to adjust diet per protocol?  Yes        07/29/19 0755               Activity restrictions: No strenuous activity   Discharge Condition: Good    Outpatient Follow-Up  1  [facility], [facility] [address]  / [address] - follow-up within one week  2  [facility] in 3 weeks  3  CMP, Dilantin August 5, 2019  Code Status: Level 1 - Full Code    Discharge Statement    I spent 33 minutes discharging the patient  This time was spent on the day of discharge  Greater than 50% of total time was spent with the patient and / or family counseling and / or coordination of care  John Malone MD  HOSPITALIST SERVICES  7/30/2019    PLEASE NOTE:  This encounter was completed utilizing the [employer] Direct Speech Voice Recognition Software  Grammatical errors, random word insertions, pronoun errors and incomplete sentences are occasional consequences of the system due to software limitations, ambient noise and hardware issues  These may be missed by proof reading prior to affixing electronic signature  Any questions or concerns about the content, text or information contained within the body of this dictation should be directly addressed to the physician for clarification  Please do not hesitate to call me directly if you have any any questions or concerns

## 2019-07-30 NOTE — PLAN OF CARE
Problem: Potential for Falls  Goal: Patient will remain free of falls  Description  INTERVENTIONS:  - Assess patient frequently for physical needs  -  Identify cognitive and physical deficits and behaviors that affect risk of falls    -  [facility] fall precautions as indicated by assessment   - Educate patient/family on patient safety including physical limitations  - Instruct patient to call for assistance with activity based on assessment  - Modify environment to reduce risk of injury  - Consider OT/PT consult to assist with strengthening/mobility  Outcome: Progressing     Problem: NEUROSENSORY - ADULT  Goal: Achieves maximal functionality and self care  Description  INTERVENTIONS  - Monitor swallowing and airway patency with patient fatigue and changes in neurological status  - Encourage and assist patient to increase activity and self care with guidance from rehab services  - Encourage visually impaired, hearing impaired and aphasic patients to use assistive/communication devices  Outcome: Progressing     Problem: SKIN/TISSUE INTEGRITY - ADULT  Goal: Skin integrity remains intact  Description  INTERVENTIONS  - Identify patients at risk for skin breakdown  - Assess and monitor skin integrity  - Assess and monitor nutrition and hydration status  - Monitor labs (i e  albumin)  - Assess for incontinence   - Turn and reposition patient  - Assist with mobility/ambulation  - Relieve pressure over bony prominences  - Avoid friction and shearing  - Provide appropriate hygiene as needed including keeping skin clean and dry  - Evaluate need for skin moisturizer/barrier cream  - Collaborate with interdisciplinary team (i e  Nutrition, Rehabilitation, etc )   - Patient/family teaching  Outcome: Progressing     Problem: PAIN - ADULT  Goal: Verbalizes/displays adequate comfort level or baseline comfort level  Description  Interventions:  - Encourage patient to monitor pain and request assistance  - Assess pain using appropriate pain scale  - Administer analgesics based on type and severity of pain and evaluate response  - Implement non-pharmacological measures as appropriate and evaluate response  - Consider cultural and social influences on pain and pain management  - Notify physician/advanced practitioner if interventions unsuccessful or patient reports new pain  Outcome: Progressing     Problem: SAFETY ADULT  Goal: Maintain or return to baseline ADL function  Description  INTERVENTIONS:  -  Assess patient's ability to carry out ADLs; assess patient's baseline for ADL function and identify physical deficits which impact ability to perform ADLs (bathing, care of mouth/teeth, toileting, grooming, dressing, etc )  - Assess/evaluate cause of self-care deficits   - Assess range of motion  - Assess patient's mobility; develop plan if impaired  - Assess patient's need for assistive devices and provide as appropriate  - Encourage maximum independence but intervene and supervise when necessary  ¯ Involve family in performance of ADLs  ¯ Assess for home care needs following discharge   ¯ Request OT consult to assist with ADL evaluation and planning for discharge  ¯ Provide patient education as appropriate  Outcome: Progressing  Goal: Maintain or return mobility status to optimal level  Description  INTERVENTIONS:  - Assess patient's baseline mobility status (ambulation, transfers, stairs, etc )    - Identify cognitive and physical deficits and behaviors that affect mobility  - Identify mobility aids required to assist with transfers and/or ambulation (gait belt, sit-to-stand, lift, walker, cane, etc )  - [place] fall precautions as indicated by assessment  - Record patient progress and toleration of activity level on Mobility SBAR; progress patient to next Phase/Stage  - Instruct patient to call for assistance with activity based on assessment  - Request Rehabilitation consult to assist with strengthening/weightbearing, etc   Outcome: Progressing     Problem: DISCHARGE PLANNING  Goal: Discharge to home or other facility with appropriate resources  Description  INTERVENTIONS:  - Identify barriers to discharge w/patient and caregiver  - Arrange for needed discharge resources and transportation as appropriate  - Identify discharge learning needs (meds, wound care, etc )  - Arrange for interpretive services to assist at discharge as needed  - Refer to Case Management Department for coordinating discharge planning if the patient needs post-hospital services based on physician/advanced practitioner order or complex needs related to functional status, cognitive ability, or social support system  Outcome: Progressing     Problem: Knowledge Deficit  Goal: Patient/family/caregiver demonstrates understanding of disease process, treatment plan, medications, and discharge instructions  Description  Complete learning assessment and assess knowledge base    Interventions:  - Provide teaching at level of understanding  - Provide teaching via preferred learning methods  Outcome: Progressing     Problem: Prexisting or High Potential for Compromised Skin Integrity  Goal: Skin integrity is maintained or improved  Description  INTERVENTIONS:  - Identify patients at risk for skin breakdown  - Assess and monitor skin integrity  - Assess and monitor nutrition and hydration status  - Monitor labs (i e  albumin)  - Assess for incontinence   - Turn and reposition patient  - Assist with mobility/ambulation  - Relieve pressure over bony prominences  - Avoid friction and shearing  - Provide appropriate hygiene as needed including keeping skin clean and dry  - Evaluate need for skin moisturizer/barrier cream  - Collaborate with interdisciplinary team (i e  Nutrition, Rehabilitation, etc )   - Patient/family teaching  Outcome: Progressing

## 2019-07-30 NOTE — PHYSICAL THERAPY NOTE
PT eval/tx   07/30/19 0941   Note Type   Note type Eval/Treat   Pain Assessment   Pain Assessment 0-10   Pain Score 9   Pain Location Generalized  (recently medicated)   Home Living   Type of [address] [facility] One level; Able to live on main level with bedroom/bathroom   Home Equipment   (no AD pta)   Prior Function   Level of [place] Independent with ADLs and functional mobility   Lives With Other (Comment)  (roomate)   Receives Help From Friend(s)   ADL Assistance Independent   IADLs Independent   Falls in the last 6 months >10   Vocational Retired   Comments states drives but not recently   Restrictions/Precautions   [place] Bearing Precautions Per Order No   Other Precautions Chair Alarm;Cognitive   General   Additional Pertinent History hx of TBI s/p 15yrs  No Adm for amb but several recent falls pta   Cognition   Overall Cognitive Status Impaired   Attention Difficulty attending to directions   Orientation Level Oriented X4   Memory Decreased recall of recent events   Following Commands Follows one step commands with increased time or repetition   Comments tangential at times and slightly aggitated wants to go home   RUE Assessment   RUE Assessment WFL   LUE Assessment   LUE Assessment WFL   RLE Assessment   RLE Assessment WNL   LLE Assessment   LLE Assessment WNL   Coordination   Movements are Fluid and Coordinated 1   Proprioception   RLE Proprioception Grossly intact   LLE Proprioception Grossly Intact   Bed Mobility   Supine to Sit 5  Supervision   Transfers   Sit to Stand 5  Supervision   Stand to Sit 5  Supervision   Stand pivot 5  Supervision   Ambulation/Elevation   Gait pattern Narrow ISABELLA;Step to;Short stride; Inconsistent shaq; Improper Weight shift;Retropulsion   Gait Assistance 4  Minimal assist   Additional items Assist x 1   Assistive Device None;Rolling walker   Distance 10' 30' with rw second attempt   Balance   Static Sitting Normal   Dynamic Sitting Good   Static Standing Fair + Dynamic Standing Fair   Ambulatory Fair   Endurance Deficit   Endurance Deficit Yes   Endurance Deficit Description tires quickly   Activity Tolerance   Activity Tolerance Patient tolerated treatment well   Nurse Made Aware yse   Assessment   Prognosis Good   Problem List Decreased endurance; Impaired balance;Decreased mobility; Decreased coordination;Decreased safety awareness;Decreased skin integrity   Assessment Pt adm after several recent falls with med toxicity  Levels adjusting  Much safer and more confident with rw  Tends to be a little impulsive  Should be functional for home d/c with rw use  Care maanger notified   REcommend home PT as well     Barriers to Discharge   (medical status)   Goals   Patient Goals go home   Plan   Treatment/Interventions Gait training   PT Frequency   (tent for d/c today)   Recommendation   Recommendation Home with family support;Home PT   Equipment Recommended Walker   PT - OK to Discharge Yes   Barthel Index   Feeding 10   Bathing 0   Grooming Score 5   Dressing Score 10   Bladder Score 10   Bowels Score 10   Toilet Use Score 10   Transfers (Bed/Chair) Score 10   Mobility (Level Surface) Score 0   Stairs Score 5   Barthel Index Score 70   Tova Lynch, PT

## 2019-07-30 NOTE — DISCHARGE INSTRUCTIONS
1  BMP, DEPAKOTE AND DILANTIN LEVEL ON AUGUST 5, 2019  Dilantin Toxicity, Ambulatory Care   GENERAL INFORMATION:   Dilantin , or phenytoin, toxicity happens when you have high levels of Dilantin in your body that become harmful  Dilantin is a medicine that is used to prevent and treat seizures  Dilantin toxicity can lead to a coma  Your risk of Dilantin toxicity is higher if you are elderly  Your risk is increased if your dose is increased or you take other medicines that affect the way Dilantin works  Examples include other medicines used to treat seizures and some antibiotics  Other examples include certain medicines used to treat arrhythmias (abnormal heart rhythms), alcoholism, ulcers, and tuberculosis  Seek immediate care for the following symptoms:   · Fast, uncontrollable eye movements or double vision    · Lack of coordination of fingers, hands, arms, legs, or other part of your body    · Dizziness, drowsiness, or confusion    · Slurred speech    · Irregular or jerky movements    · Inability to be awakened  How to safely take Dilantin:   · Take this medicine exactly as directed  Contact your healthcare provider if you miss a dose or you have any questions about how to take Dilantin  · Do not stop taking Dilantin or change your dose  Your risk of seizures increases if you decrease your dose or stop taking Dilantin  Even a small increase in your dose can cause toxicity  · Go to all your follow-up appointments  Your healthcare provider will need to monitor you closely while you are taking Dilantin  You will need regular blood and urine tests  What else you should do while you are taking Dilantin:  Wear medical alert jewelry or carry a card that says you take Dilantin  Ask where to get these items  What you should do if you think you or someone you know took too much Dilantin:  Call the [facility] at [phone] immediately      Follow up with your healthcare provider as directed:  Write down your questions so you remember to ask them during your visits  CARE AGREEMENT:   You have the right to help plan your care  Learn about your health condition and how it may be treated  Discuss treatment options with your caregivers to decide what care you want to receive  You always have the right to refuse treatment  The above information is an  only  It is not intended as medical advice for individual conditions or treatments  Talk to your doctor, nurse or pharmacist before following any medical regimen to see if it is safe and effective for you  © 2014 [address] is for End User's use only and may not be sold, redistributed or otherwise used for commercial purposes  All illustrations and images included in CareNotes® are the copyrighted property of "[employer] , Inc  or Jorge A Woody  Dilantin Toxicity, Ambulatory Care   Doyon S: Anticonvulsants  In: [place] Ortiz Boyle, Elsie MACIAS, et al, eds  [facility]'s Toxicologic Emergencies, 9th ed  [place], [place], 2011  [place] ORR & Tha RC: Phenytoin  In: Tha DURAN, ed  5-Minute Toxicology Consult, 1st ed  [facility],[address] [place] [address], 2000  Alexandra ESTEBAN & Aks SE: Phenytoin  In: Gillian Portillo, ed  [facility]' Clinical Practice of Emergency Medicine, 5th ed  [facility],[address] [place] [address], [place], 2010  [address] [facility]  RD: Adverse Effects of Anticonvulsants and Psychotropic Agents  In: Prudencio Medina SG, Domingo Lawton, et al, eds  Pediatric Emergency Medicine, 1st ed  [address], [place], [place], 2008  Teja SM: Phenytoin (Diphenylhydantoin, Phenytoin Sodium)  In: Anthony's Principles & Practice of Intravenous Therapy, 8th ed  [facility],[address] [place] [address], 2007  Lexx MORAN: Phenytoin Poisoning  In: Yadiel Berman RM, [place] [facility], et al, eds  [address] Emergency Medicine Consult, 4th ed   [place] [address] [facility], [place], [place], 2011 © 2014 [address] is for End User's use only and may not be sold, redistributed or otherwise used for commercial purposes  All illustrations and images included in CareNotes® are the copyrighted property of A D A M , Inc  or Jogre A Woody  The above information is an  only  It is not intended as medical advice for individual conditions or treatments  Talk to your doctor, nurse or pharmacist before following any medical regimen to see if it is safe and effective for you

## 2019-07-30 NOTE — PLAN OF CARE
Problem: OCCUPATIONAL THERAPY ADULT  Goal: Performs self-care activities at highest level of function for planned discharge setting  See evaluation for individualized goals  Note:   Limitation: Decreased ADL status, Decreased UE strength, Decreased Safe judgement during ADL, Decreased cognition, Decreased endurance, Decreased self-care trans, Decreased high-level ADLs  Prognosis: Guarded  Assessment: Pt is a 60 y o  male seen for OT evaluation at [facility], admitted 7/28/2019 w/ Ataxia  OT completed extensive review of pt's medical and social history  Comorbidities affecting pt's functional performance at time of assessment include: accidental phenytoin poisoning, Hx TBI (15 yrs ago), seizure disorder  Personal factors affecting pt at time of IE include:steps to enter environment, limited home support, difficulty performing ADLS, difficulty performing IADLS , limited insight into deficits and compliance  Prior to admission, pt was living in [place], [place] with roommate and 7 cats and was independent with ADLs, IADLs, and driving  Pt has had many recent falls in various locations and circumstances  Upon evaluation, pt presents to OT below baseline due to the following performance deficits: decreased balance, impulsivity, decreased safety awareness and increased pain  Pt to benefit from continued skilled OT tx while in the hospital to address deficits as defined above and maximize level of functional independence w ADL's and functional mobility  Occupational Performance areas to address include: bathing/shower, toilet hygiene, dressing, functional mobility, community mobility and household maintenance  Based on findings, pt is of high complexity  At this time, OT recommendations at time of discharge are home OT  OT Discharge Recommendation: Home OT(and family support/assist)  OT - OK to Discharge:  Yes

## 2019-07-30 NOTE — PROGRESS NOTES
Patient questioning depakote dosing as ordered daily however [facility]MED states BID 1000mg am and 1500mg pm  Dosing adjusted however RN to get pharmacy to clarify medications  Patient questioning why his oxycodone was changed to 5mg from 15mg  PMED reviewed and patient prescribed oxycodone 15mg filled 6/28 for quantity 180 30 day supply from [facility] in [facility] by Stephanie Calabrese [phone]  Patient then obtained another fill 7/9, 7/16, 7/23 for quantity 42 7day supply on each of these dates from [address] [place] [address] in [facility]  Will need to call office in am  At this time I adjusted to oxycodone 15mg q6hrs prn  [facility] list OxyContin 15mg q 6hrs   No notes to review in our system

## 2019-07-30 NOTE — SOCIAL WORK
LOS: 0  Pt is not a documented bundle  Pt is not a readmission  Met with Pt  Pt presents AA&Ox3  Discussed role of   Pt informed by  of observation status and signed observation form  Pt lives with roommates in [address]  Pt is independent with adls and ambulation  Pt uses [facility] on [address] and [facility] on [address]  Pt reports he has a prescription plan and is able to afford his meds  Pt reports he drives  Pt reports he does not have living will or POA  Pt declines information for living will and POA  Pt denies VNA in past  Pt reports he was at [facility] in past for SNF  Pt denies mental health and drug and alcohol treatment  Acknowledge PT/OT recommendation for home PT/OT  Pt in agreement for home PT/OT  [place] of Choice given  Pt has no preference and agreeable for [facility]  Referral sent to [facility] via [facility] for SN/PT/OT  Pt agreeable for rolling walker  [employer] of Choice given for DME company  Pt agreeable for [facility]  Referral sent to [facility] via [facility]  Rolling walker taken from consignment  Completed script faxed to [facility]  Completed script left in file folder in consignment closet  Pt reports his roommate will transport Pt home

## 2019-07-30 NOTE — OCCUPATIONAL THERAPY NOTE
Occupational Therapy Evaluation & Treatment Note  OT eval [id number]; OT tx [id number]    Krys Bias    7/30/2019    Patient Active Problem List   Diagnosis    Ataxia    Accidental phenytoin poisoning    H/O traumatic brain injury    Seizure disorder [facility])       Past Medical History:   Diagnosis Date    Anxiety     Back pain     History of herniated intervertebral disc     Seizures ([facility] [address] [facility]     TBI (traumatic brain injury) ([facility] [address] )        Past Surgical History:   Procedure Laterality Date    BACK SURGERY      BRAIN SURGERY      LEG SURGERY          07/30/19 0940   Note Type   Note type Eval/Treat   Restrictions/Precautions   Weight Bearing Precautions Per Order No   Other Precautions Cognitive; Chair Alarm; Bed Alarm;Telemetry; Fall Risk;Pain   Pain Assessment   Pain Assessment 0-10   Pain Score 9   Pain Location Generalized   Patient's Stated Pain Goal No pain   Hospital Pain Intervention(s) Repositioned; Ambulation/increased activity   Home Living   Type of [address] One level  (1STE)   Bathroom Shower/Tub Tub/shower unit   Home Equipment   (Pt denies DME)   Prior Function   Level of [place] Independent with ADLs and functional mobility   Lives With   (Roommate and 7 cats)   Receives Help From Friend(s)   ADL Assistance Independent   IADLs Independent   Falls in the last 6 months >10   Vocational Retired   Comments +   Lifestyle   Intrinsic Gratification Cats, watching Nascar   Psychosocial   Psychosocial (WDL) X   Patient Behaviors/Mood Anxious;Irritable   ADL   Eating Assistance 7  Independent   Grooming Assistance 6  Modified Independent   UB Bathing Assistance 6  Modified Independent   LB Bathing Assistance 5  Supervision/Setup   UB Dressing Assistance 6  Modified independent   LB Dressing Assistance 5  Supervision/Setup   Toileting Assistance  5  Supervision/Setup   Bed Mobility   Supine to Sit 5  Supervision   Additional Comments Pt situated in recliner with chair alarm at end of session   Transfers   Sit to Stand 5  Supervision   Stand to Sit 5  Supervision   Stand pivot 5  Supervision   Functional Mobility   Functional Mobility 5  Supervision   Additional Comments Pt required CGA/Min A when ambulating with no AD   Additional items Rolling walker   Activity Tolerance   Activity Tolerance Patient tolerated treatment well   Nurse Made Aware MICHAEL Busch cleared pt for eval   RUE Assessment   RUE Assessment WFL   LUE Assessment   LUE Assessment WFL   Hand Function   Gross Motor Coordination Functional   Fine Motor Coordination Functional   Cognition   Overall Cognitive Status Impaired   Arousal/Participation Alert   Attention Difficulty attending to directions   Orientation Level Oriented to person;Oriented to place  (Pt thought is was 2001, before being cued)   Memory Decreased recall of precautions   Following Commands Follows one step commands inconsistently   Assessment   Limitation Decreased ADL status; Decreased UE strength;Decreased Safe judgement during ADL;Decreased cognition;Decreased endurance;Decreased self-care trans;Decreased high-level ADLs   Prognosis Guarded   Assessment Pt is a 60 y o  male seen for OT evaluation at [facility], admitted 7/28/2019 w/ Ataxia  OT completed extensive review of pt's medical and social history  Comorbidities affecting pt's functional performance at time of assessment include: accidental phenytoin poisoning, Hx TBI (15 yrs ago), seizure disorder  Personal factors affecting pt at time of IE include:steps to enter environment, limited home support, difficulty performing ADLS, difficulty performing IADLS , limited insight into deficits and compliance  Prior to admission, pt was living in [place], [place] with roommate and 7 cats and was independent with ADLs, IADLs, and driving  Pt has had many recent falls in various locations and circumstances   Upon evaluation, pt presents to OT below baseline due to the following performance deficits: decreased balance, impulsivity, decreased safety awareness and increased pain  Pt to benefit from continued skilled OT tx while in the hospital to address deficits as defined above and maximize level of functional independence w ADL's and functional mobility  Occupational Performance areas to address include: bathing/shower, toilet hygiene, dressing, functional mobility, community mobility and household maintenance  Based on findings, pt is of high complexity  At this time, OT recommendations at time of discharge are home OT  Goals   Patient Goals "get out of here"   Plan   Treatment Interventions ADL retraining;Functional transfer training;UE strengthening/ROM; Endurance training;Cognitive reorientation;Patient/family training; Compensatory technique education   Goal Expiration Date 08/06/19   OT Frequency 2-3x/wk   Additional Treatment Session   Start Time 8915   End Time 0940   Treatment Assessment Patient participated in Skilled OT session this date with interventions consisting of Energy Conservation techniques, safety awareness and fall prevention techniques,  therapeutic activities to: increase activity tolerance and increase dynamic sit/ stand balance during functional activity    Patient agreeable to OT treatment session, upon arrival patient was found supine in bed  Treatment session as follows: Pt sat EOB with encouragement and supervision  Pt able to stand and ambulate to/from bathroom with no AD and CGA/Min A  Pt noted to be unsteady  Pt agreeable to try RW, though states he does not especially want to use it at home cause it is inconvenient and would make him change his lifestyle  Pt with increased stability with RW; able to ambulate to doorway and back with supervision  Due to assessment and pt hx of freq falls, pt will require RW at D/C and would benefit from Home OT for edu on use of RW during ADLs/IADLs, which would provide pt with techniques to incorporate RW into his lifestyle therefore more likely to continue use at home  Patient continues to be functioning below baseline level, occupational performance remains limited secondary to factors listed above and increased risk for falls and injury  From OT standpoint, recommendation at time of d/c would be Home OT  Patient to benefit from continued Occupational Therapy treatment while in the hospital to address deficits as defined above and maximize level of functional independence with ADLs and functional mobility  Pt left with call bell in reach, tray table in reach, needs met, chair alarm activated  Recommendation   OT Discharge Recommendation Home OT  (and family support/assist)   Equipment Recommended   (RW)   OT - OK to Discharge Yes   Barthel Index   Feeding 10   Bathing 0   Grooming Score 5   Dressing Score 10   Bladder Score 10   Bowels Score 10   Toilet Use Score 5   Transfers (Bed/Chair) Score 10   Mobility (Level Surface) Score 0   Stairs Score 0   Barthel Index Score 60     Pt will achieve the following goals within 10 days  *Pt will complete LB bathing and dressing with Mod I      *Pt will complete toileting (hygiene and clothing management) with Mod I     *Pt will perform functional transfers with Mod I in order to complete ADL routine  *Pt will complete item retrieval and light home management with Mod I while demonstrating good safety  *Pt will demonstrate increased activity tolerance in order to complete ADL routine  *Pt will participate in cognitive assessment to determine level of safety for returning home    *Pt will participate in UE therapeutic exercise in order to maximize strength for ADL transfers  *Pt will identify 3-5 fall risks to ensure safety upon discharge  *Pt will be edu on safe use of RW during ADL/IADL tasks      [employer], OT

## 2019-07-30 NOTE — CONSULTS
Visited pt for MD consult for underweight  Diet education provided on weight management strategies  Pt resistant to gaining any weight as then he would have to buy new clothes  Pt uses food stamps to purchase food  Encouraged to use food pantry for additional needs  See full consult completed in Nutr/Eval section of flowsheets  Noted pt for d/c today

## 2019-07-30 NOTE — UTILIZATION REVIEW
Continued Stay Review   7/29/2019  0654 OBSERVATION     Date: 7/30/2019                           Current Patient Class: observation  Current Level of Care: med surg     HPI:59 y o  male initially admitted on  7/28 to ED and placed in observation on 7/29/2019 from home to ED due to ataxia/accidental phenytoin poisoning  Has history of seizure disorder as result of TBI  Presented with increasing falls  Since 7/27 has not felt right in the head and throughout the weekend with unsteadiness and falls  CT Head/CSpine/LSpine negative for acute injuries  Dilantin elevated at 37 1  Was recently increased due to sub therapeutic level     Dilantin is on hold  Neurology consulted  Per neurology Presentation likely consistent with phenytoin toxicity, level 37  Suspect increase from 200mg qHS to 300mg qHS precipitated his toxicity as this would have been a 50% increase in his dose and due to non-linear kinetics toxicity can occur from small dose adjustments  Agree with holding for now  Even though level was low with 200mg qHS, he has not had a seizure for over 10 years per patient (unable to find much in records) on his current dosing along with Depakote  Recheck level as outpatient and restart at prior dosing when total level <20; check albumin along with level    Assessment/Plan:  No seizure activity, neuro checks in progress  Dilantin level remains elevated at 43 4  PT pending  Pertinent Labs/Diagnostic Results:   7/29/2019  CT lumbar spine - Normal computed tomography of the lumbar spine    7/29/2019  CxR- No acute cardiopulmonary disease  7/29/2019  CT cervical spine - No cervical spine fracture or traumatic malalignment  7/29/2019 CT head - No acute intracranial abnormality  Ref Range & Units 7/30/19 0502 7/29/19 0034 6/7/19 1541   Phenytoin Lvl 10 0 - 20 0 ug/mL 34 4High Panic   37  1High Panic   3 3Low        Results from last 7 days   Lab Units 07/29/19  0034   WBC Thousand/uL 5 76   HEMOGLOBIN g/dL 13 8   HEMATOCRIT % 40 4   PLATELETS Thousands/uL 180   NEUTROS ABS Thousands/µL 4 13     Results from last 7 days   Lab Units 07/29/19  0034   SODIUM mmol/L 140   POTASSIUM mmol/L 3 6   CHLORIDE mmol/L 102   CO2 mmol/L 29   ANION GAP mmol/L 9   BUN mg/dL 17   CREATININE mg/dL 0 67   EGFR ml/min/1 73sq m 104   CALCIUM mg/dL 9 0     Results from last 7 days   Lab Units 07/29/19  0034   GLUCOSE RANDOM mg/dL 137     Results from last 7 days   Lab Units 07/29/19  0514 07/29/19  0038   TROPONIN I ng/mL 0 05* 0 07*     Results from last 7 days   Lab Units 07/29/19  0034   PROTIME seconds 13 4   INR  1 05   PTT seconds 29     Results from last 7 days   Lab Units 07/29/19  0411   CLARITY UA  Clear   COLOR UA  Yellow   SPEC GRAV UA  1 025   PH UA  6 0   GLUCOSE UA mg/dl Negative   KETONES UA mg/dl 15 (1+)*   BLOOD UA  Negative   PROTEIN UA mg/dl Negative   NITRITE UA  Negative   BILIRUBIN UA  Negative   UROBILINOGEN UA E U /dl 0 2   LEUKOCYTES UA  Negative       Vital Signs:   07/30/19 0700  98 9 °F (37 2 °C)  77  18  153/78  97 %  --  Lying   07/29/19 2300  98 4 °F (36 9 °C)  86  20  123/59  96 %  --  Lying   07/29/19 1459  97 5 °F (36 4 °C)  82  20  104/72  96 %  None (Room air)  Lying   07/29/19 0800  98 5 °F (36 9 °C)  79  20  142/79  95 %  None (Room air)        Eye Opening Best Verbal Response Best Motor Response [place] Coma Scale Score   07/29/19 2200 4 5 6 15   07/29/19 0935 4 5 6 15   07/29/19 0645 4 5 6 15   07/29/19 0600 4 5 6 15   07/29/19 0500 4 5 6 15   07/29/19 0415 4 5 6 15   07/29/19 0400 4 5 6 15   07/29/19 0300 4 5 6 15   07/29/19 0242 4 5 6 15   07/29/19 0145 4 5 6 15   07/29/19 0130 4 5 6 15   07/29/19 0100 4 5 6 15   07/29/19 0045 4 5 6 15   07/29/19 0030 4 5 6 15   07/29/19 0020 4 5 6 15   07/28/19 2359 4 5 6 15   07/28/19 2355 4 5 6 15       Medications:   Scheduled Meds:   Current Facility-Administered Medications:  divalproex sodium 1,000 mg Oral Daily   divalproex sodium 1,500 mg Oral HS docusate sodium 100 mg Oral BID   ibuprofen 800 mg Oral Q8H PRN   LORazepam 0 5 mg Oral Q8H PRN   nicotine 14 mg Transdermal Daily   oxyCODONE - used x 1 15 mg Oral Q6H PRN   senna 1 tablet Oral HS   traMADol 50 mg Oral Q6H PRN       Discharge Plan: To be determined  Network Utilization Review Department  Phone: [phone]; Fax [phone]  [email or web address]  ATTENTION: Please call with any questions or concerns to [phone]  and carefully listen to the prompts so that you are directed to the right person  Send all requests for admission clinical reviews, approved or denied determinations and any other requests to fax [phone]   All voicemails are confidential

## 2019-07-30 NOTE — PROGRESS NOTES
Discussed with pt which pharmacies he uses  Pt states that he uses the [facility] at [address] in [facility] for his depakote  Pt states that he uses the [facility] on [facility] for other prescriptions and his oxycontin   Pt states he will go to [facility] to fill his oxycontin prescription when unavailable at [facility]

## 2019-07-31 ENCOUNTER — HOSPITAL ENCOUNTER (EMERGENCY)
Facility: HOSPITAL | Age: 59
End: 2019-07-31
Attending: EMERGENCY MEDICINE | Admitting: EMERGENCY MEDICINE
Payer: COMMERCIAL

## 2019-07-31 ENCOUNTER — APPOINTMENT (EMERGENCY)
Dept: RADIOLOGY | Facility: HOSPITAL | Age: 59
End: 2019-07-31
Payer: COMMERCIAL

## 2019-07-31 VITALS
RESPIRATION RATE: 16 BRPM | HEART RATE: 79 BPM | OXYGEN SATURATION: 95 % | WEIGHT: 145.28 LBS | DIASTOLIC BLOOD PRESSURE: 75 MMHG | HEIGHT: 73 IN | TEMPERATURE: 97.3 F | BODY MASS INDEX: 19.25 KG/M2 | SYSTOLIC BLOOD PRESSURE: 123 MMHG

## 2019-07-31 DIAGNOSIS — R42 LIGHTHEADEDNESS: Primary | ICD-10-CM

## 2019-07-31 LAB
ALBUMIN SERPL BCP-MCNC: 3.2 G/DL (ref 3.5–5)
ALP SERPL-CCNC: 73 U/L (ref 46–116)
ALT SERPL W P-5'-P-CCNC: 18 U/L (ref 12–78)
ANION GAP SERPL CALCULATED.3IONS-SCNC: 7 MMOL/L (ref 4–13)
AST SERPL W P-5'-P-CCNC: 21 U/L (ref 5–45)
BASOPHILS # BLD AUTO: 0.02 THOUSANDS/ΜL (ref 0–0.1)
BASOPHILS NFR BLD AUTO: 1 % (ref 0–1)
BILIRUB SERPL-MCNC: 0.7 MG/DL (ref 0.2–1)
BUN SERPL-MCNC: 14 MG/DL (ref 5–25)
CALCIUM SERPL-MCNC: 8.8 MG/DL (ref 8.3–10.1)
CHLORIDE SERPL-SCNC: 100 MMOL/L (ref 100–108)
CLARITY, POC: CLEAR
CO2 SERPL-SCNC: 29 MMOL/L (ref 21–32)
COLOR, POC: NORMAL
CREAT SERPL-MCNC: 0.67 MG/DL (ref 0.6–1.3)
EOSINOPHIL # BLD AUTO: 0.02 THOUSAND/ΜL (ref 0–0.61)
EOSINOPHIL NFR BLD AUTO: 1 % (ref 0–6)
ERYTHROCYTE [DISTWIDTH] IN BLOOD BY AUTOMATED COUNT: 12 % (ref 11.6–15.1)
EXT BILIRUBIN, UA: NORMAL
EXT BLOOD URINE: NEGATIVE
EXT GLUCOSE, UA: NEGATIVE
EXT KETONES: NEGATIVE
EXT NITRITE, UA: NEGATIVE
EXT PH, UA: 6.5
EXT PROTEIN, UA: NORMAL
EXT SPECIFIC GRAVITY, UA: 1.01
EXT UROBILINOGEN: 0.2
GFR SERPL CREATININE-BSD FRML MDRD: 104 ML/MIN/1.73SQ M
GLUCOSE SERPL-MCNC: 108 MG/DL (ref 65–140)
HCT VFR BLD AUTO: 41.4 % (ref 36.5–49.3)
HGB BLD-MCNC: 14.4 G/DL (ref 12–17)
IMM GRANULOCYTES # BLD AUTO: 0.01 THOUSAND/UL (ref 0–0.2)
IMM GRANULOCYTES NFR BLD AUTO: 0 % (ref 0–2)
LYMPHOCYTES # BLD AUTO: 0.89 THOUSANDS/ΜL (ref 0.6–4.47)
LYMPHOCYTES NFR BLD AUTO: 20 % (ref 14–44)
MCH RBC QN AUTO: 34 PG (ref 26.8–34.3)
MCHC RBC AUTO-ENTMCNC: 34.8 G/DL (ref 31.4–37.4)
MCV RBC AUTO: 98 FL (ref 82–98)
MONOCYTES # BLD AUTO: 0.59 THOUSAND/ΜL (ref 0.17–1.22)
MONOCYTES NFR BLD AUTO: 13 % (ref 4–12)
NEUTROPHILS # BLD AUTO: 2.9 THOUSANDS/ΜL (ref 1.85–7.62)
NEUTS SEG NFR BLD AUTO: 65 % (ref 43–75)
NRBC BLD AUTO-RTO: 0 /100 WBCS
PHENYTOIN SERPL-MCNC: 24.7 UG/ML (ref 10–20)
PLATELET # BLD AUTO: 189 THOUSANDS/UL (ref 149–390)
PMV BLD AUTO: 10 FL (ref 8.9–12.7)
POTASSIUM SERPL-SCNC: 4 MMOL/L (ref 3.5–5.3)
PROT SERPL-MCNC: 7.4 G/DL (ref 6.4–8.2)
RBC # BLD AUTO: 4.24 MILLION/UL (ref 3.88–5.62)
SODIUM SERPL-SCNC: 136 MMOL/L (ref 136–145)
TROPONIN I SERPL-MCNC: 0.04 NG/ML
VALPROATE SERPL-MCNC: 66 UG/ML (ref 50–100)
WBC # BLD AUTO: 4.43 THOUSAND/UL (ref 4.31–10.16)
WBC # BLD EST: NEGATIVE 10*3/UL

## 2019-07-31 PROCEDURE — 80164 ASSAY DIPROPYLACETIC ACD TOT: CPT | Performed by: PHYSICIAN ASSISTANT

## 2019-07-31 PROCEDURE — 85025 COMPLETE CBC W/AUTO DIFF WBC: CPT | Performed by: PHYSICIAN ASSISTANT

## 2019-07-31 PROCEDURE — 84484 ASSAY OF TROPONIN QUANT: CPT | Performed by: PHYSICIAN ASSISTANT

## 2019-07-31 PROCEDURE — 81002 URINALYSIS NONAUTO W/O SCOPE: CPT | Performed by: PHYSICIAN ASSISTANT

## 2019-07-31 PROCEDURE — 99282 EMERGENCY DEPT VISIT SF MDM: CPT | Performed by: PHYSICIAN ASSISTANT

## 2019-07-31 PROCEDURE — 80053 COMPREHEN METABOLIC PANEL: CPT | Performed by: PHYSICIAN ASSISTANT

## 2019-07-31 PROCEDURE — 80185 ASSAY OF PHENYTOIN TOTAL: CPT | Performed by: PHYSICIAN ASSISTANT

## 2019-07-31 PROCEDURE — 99285 EMERGENCY DEPT VISIT HI MDM: CPT

## 2019-07-31 PROCEDURE — 71046 X-RAY EXAM CHEST 2 VIEWS: CPT

## 2019-07-31 PROCEDURE — 36415 COLL VENOUS BLD VENIPUNCTURE: CPT | Performed by: PHYSICIAN ASSISTANT

## 2019-07-31 NOTE — ED NOTES
Unable to secure blood work/ IV insertion, multiple unsuccessful attempts   Patient states he "has no veins after being admitted"     Jenny Ng, MICHAEL  07/31/19 0680

## 2019-07-31 NOTE — ED NOTES
Per patient he "within the last hour I've had dizziness/weakness all over" he also report being recently discharged from the hospital for the same complaint        Fan Vanegas RN  07/31/19 7350

## 2019-07-31 NOTE — ED NOTES
Patient refusing ECG and cardiac monitoring, states, "you're not putting that shit on me, tell them no " provider notified        Lorena Heller, MICHAEL  07/31/19 5811

## 2019-07-31 NOTE — ED PROVIDER NOTES
History  Chief Complaint   Patient presents with    Weakness - Generalized     pt presents to ED after being d/c'd from inpatient yesterday  States he is still weak, and sore everywhere and can barely walk  Patient is a 61 y/o M with h/o TBI, seizure d/o, chronic back pain that presents to the ED because he felt lightheaded after taking Depakote today  He was admitted to the hospital 3 days ago for accidental dilantin overdose  He was discharged yesterday with a walker for ambulation  Patient states he has back pain which is chronic and felt lightheaded, but feels a little better now  He denies any recent falls  No chest pain or trouble breathing  He states he only took Depakote and did not take his Dilantin  History provided by:  Patient  Dizziness   Quality:  Lightheadedness  Severity:  Mild  Onset quality:  Sudden  Duration:  1 day  Timing:  Constant  Progression:  Improving  Chronicity:  New  Context comment:  After taking depakote  Relieved by:  Nothing  Worsened by:  Nothing  Ineffective treatments:  None tried  Associated symptoms: vomiting    Associated symptoms: no blood in stool, no chest pain, no diarrhea, no headaches, no nausea, no shortness of breath, no syncope and no weakness    Risk factors: no new medications        Prior to Admission Medications   Prescriptions Last Dose Informant Patient Reported? Taking? Cholecalciferol (VITAMIN D3) 2000 units capsule   Yes No   Sig: Take 2,000 Units by mouth daily   LORazepam (ATIVAN) 1 mg tablet   Yes No   Sig: Take 0 5 mg by mouth every 8 (eight) hours as needed for anxiety   divalproex sodium (DEPAKOTE ER) 500 mg 24 hr tablet   No No   Sig: Patient takes 2 tablets in the morning, 3 tablets in the evening     ibuprofen (MOTRIN) 800 mg tablet   Yes No   Sig: Take 800 mg by mouth every 8 (eight) hours as needed for mild pain   oxyCODONE (ROXICODONE) 15 mg immediate release tablet   Yes No   Sig: Take 15 mg by mouth 6 (six) times a day Facility-Administered Medications: None       Past Medical History:   Diagnosis Date    Anxiety     Back pain     History of herniated intervertebral disc     Seizures (HCC)     TBI (traumatic [facility] injury) ([facility] [address] )        Past Surgical History:   Procedure Laterality Date    BACK SURGERY      BRAIN SURGERY      LEG SURGERY         History reviewed  No pertinent family history  I have reviewed and agree with the history as documented  Social History     Tobacco Use    Smoking status: Current Every Day Smoker     Packs/day: 1 00     Types: Cigarettes    Smokeless tobacco: Current User    Tobacco comment: 14 years   Substance Use Topics    Alcohol use: Never     Frequency: Never     Binge frequency: Never    Drug use: No        Review of Systems   Constitutional: Negative for chills and fever  Respiratory: Negative for shortness of breath  Cardiovascular: Negative for chest pain and syncope  Gastrointestinal: Positive for vomiting  Negative for blood in stool, diarrhea and nausea  Musculoskeletal: Positive for back pain  Skin: Negative for color change and rash  Neurological: Positive for dizziness and light-headedness  Negative for syncope, speech difficulty, weakness and headaches  Psychiatric/Behavioral: Negative for confusion and decreased concentration  All other systems reviewed and are negative  Physical Exam  Physical Exam   Constitutional: He is oriented to person, place, and time  He appears well-developed and well-nourished  He is cooperative  He does not appear ill  No distress  HENT:   Head: Normocephalic and atraumatic  Nose: Nose normal    Mouth/Throat: Oropharynx is clear and moist    Eyes: Conjunctivae are normal    Neck: Normal range of motion  Cardiovascular: Normal rate, regular rhythm and normal heart sounds  No murmur heard  Pulmonary/Chest: Effort normal and breath sounds normal  He has no wheezes  He has no rhonchi  He has no rales     Abdominal: Soft  Normal appearance and bowel sounds are normal  There is no tenderness  Musculoskeletal: Normal range of motion  He exhibits no edema  Neurological: He is alert and oriented to person, place, and time  He has normal strength  No sensory deficit  Skin: Skin is warm and dry  No rash noted  He is not diaphoretic  There is pallor  Psychiatric: He has a normal mood and affect  Cognition and memory are normal    Nursing note and vitals reviewed        Vital Signs  ED Triage Vitals [07/31/19 1758]   Temperature Pulse Respirations Blood Pressure SpO2   (!) 97 3 °F (36 3 °C) 84 17 129/73 95 %      Temp Source Heart Rate Source Patient Position - Orthostatic VS BP Location FiO2 (%)   Temporal Monitor Lying Right arm --      Pain Score       8           Vitals:    07/31/19 1758 07/31/19 1800   BP: 129/73 129/73   Pulse: 84 78   Patient Position - Orthostatic VS: Lying          Visual Acuity  Visual Acuity      Most Recent Value   L Pupil Size (mm)  3   R Pupil Size (mm)  3          ED Medications  Medications - No data to display    Diagnostic Studies  Results Reviewed     Procedure Component Value Units Date/Time    POCT urinalysis dipstick [[id number]]  (Normal) Resulted:  07/31/19 1957    Lab Status:  Final result Specimen:  Urine Updated:  07/1959     Color, UA orange     Clarity, UA clear     Glucose, UA (Ref: Negative) negative     Bilirubin, UA (Ref: Negative) small     Ketones, UA (Ref: Negative) negative     Spec Grav, UA (Ref:1 003-1 030) 1 01     Blood, UA (Ref: Negative) negative     pH, UA (Ref: 4 5-8 0) 6 5     Protein, UA (Ref: Negative) trace     Urobilinogen, UA (Ref: 0 2- 1 0) 0 2      Leukocytes, UA (Ref: Negative) negative     Nitrite, UA (Ref: Negative) negative    Comprehensive metabolic panel [[id number]]  (Abnormal) Collected:  07/31/19 1851    Lab Status:  Final result Specimen:  Blood from Arm, Right Updated:  07/31/19 1925     Sodium 136 mmol/L      Potassium 4 0 mmol/L      Chloride 100 mmol/L      CO2 29 mmol/L      ANION GAP 7 mmol/L      BUN 14 mg/dL      Creatinine 0 67 mg/dL      Glucose 108 mg/dL      Calcium 8 8 mg/dL      AST 21 U/L      ALT 18 U/L      Alkaline Phosphatase 73 U/L      Total Protein 7 4 g/dL      Albumin 3 2 g/dL      Total Bilirubin 0 70 mg/dL      eGFR 104 ml/min/1 73sq m     Narrative:       National Kidney Disease Foundation guidelines for Chronic Kidney Disease (CKD):     Stage 1 with normal or high GFR (GFR > 90 mL/min/1 73 square meters)    Stage 2 Mild CKD (GFR = 60-89 mL/min/1 73 square meters)    Stage 3A Moderate CKD (GFR = 45-59 mL/min/1 73 square meters)    Stage 3B Moderate CKD (GFR = 30-44 mL/min/1 73 square meters)    Stage 4 Severe CKD (GFR = 15-29 mL/min/1 73 square meters)    Stage 5 End Stage CKD (GFR <15 mL/min/1 73 square meters)  Note: GFR calculation is accurate only with a steady state creatinine    Valproic acid level, total [[id number]]  (Normal) Collected:  07/31/19 1851    Lab Status:  Final result Specimen:  Blood from Arm, Right Updated:  07/31/19 1925     Valproic Acid, Total 66 ug/mL     Phenytoin level, total [[id number]]  (Abnormal) Collected:  07/31/19 1851    Lab Status:  Final result Specimen:  Blood from Arm, Right Updated:  07/31/19 1925     Phenytoin Lvl 24 7 ug/mL     Troponin I [[id number]]  (Normal) Collected:  07/31/19 1851    Lab Status:  Final result Specimen:  Blood from Arm, Right Updated:  07/31/19 1924     Troponin I 0 04 ng/mL     CBC and differential [[id number]]  (Abnormal) Collected:  07/31/19 1851    Lab Status:  Final result Specimen:  Blood from Arm, Right Updated:  07/31/19 1905     WBC 4 43 Thousand/uL      RBC 4 24 Million/uL      Hemoglobin 14 4 g/dL      Hematocrit 41 4 %      MCV 98 fL      MCH 34 0 pg      MCHC 34 8 g/dL      RDW 12 0 %      MPV 10 0 fL      Platelets 115 Thousands/uL      nRBC 0 /100 WBCs      Neutrophils Relative 65 %      Immat GRANS % 0 %      Lymphocytes Relative 20 %      Monocytes Relative 13 %      Eosinophils Relative 1 %      Basophils Relative 1 %      Neutrophils Absolute 2 90 Thousands/µL      Immature Grans Absolute 0 01 Thousand/uL      Lymphocytes Absolute 0 89 Thousands/µL      Monocytes Absolute 0 59 Thousand/µL      Eosinophils Absolute 0 02 Thousand/µL      Basophils Absolute 0 02 Thousands/µL                  XR chest 2 views   ED Interpretation by Rayray Massey PA-C (07/31 1925)   No acute abnormalities  Procedures  Procedures       ED Course                               MDM  Number of Diagnoses or Management Options  Lightheadedness: new and requires workup  Diagnosis management comments: Patient with lightheadedness, will check labs, ekg, trop to r/o cardiac disease, electrolyte abnormality, dehydration, UTI  No abnormalities on labs, symptoms improved, will d/c with f/u with PCP  PHenytoin level decreasing  Amount and/or Complexity of Data Reviewed  Clinical lab tests: ordered and reviewed  Tests in the radiology section of CPT®: ordered and reviewed  Independent visualization of images, tracings, or specimens: yes    Patient Progress  Patient progress: stable      Disposition  Final diagnoses:   Lightheadedness     Time reflects when diagnosis was documented in both MDM as applicable and the Disposition within this note     Time User Action Codes Description Comment    7/31/2019  8:03 PM Supriya Tee Add [[id number]] [facility]       ED Disposition     ED Disposition Condition Date/Time Comment    Discharge Stable Wed Jul 31, 2019  8:03 PM [facility] discharge to home/self care  Follow-up Information     Follow up With Specialties Details Why [address], DO Family Medicine Call in 1 day For recheck [address]  [facility]  [phone]            Patient's Medications   Discharge Prescriptions    No medications on file     No discharge procedures on file      ED Provider  Electronically Signed by           Rahul Courtney PA-C  07/31/19 2006

## 2019-08-01 NOTE — DISCHARGE INSTRUCTIONS
Rest, increase fluids  Continue your current medications  Follow up with your family doctor for recheck in 1-2 days

## 2019-08-07 ENCOUNTER — APPOINTMENT (OUTPATIENT)
Dept: LAB | Facility: HOSPITAL | Age: 59
End: 2019-08-07
Payer: COMMERCIAL

## 2019-08-07 DIAGNOSIS — G40.909 SEIZURE DISORDER (HCC): ICD-10-CM

## 2019-08-07 DIAGNOSIS — T42.0X1A: ICD-10-CM

## 2019-08-08 ENCOUNTER — TRANSCRIBE ORDERS (OUTPATIENT)
Dept: ADMINISTRATIVE | Facility: HOSPITAL | Age: 59
End: 2019-08-08

## 2019-08-08 ENCOUNTER — APPOINTMENT (OUTPATIENT)
Dept: LAB | Facility: HOSPITAL | Age: 59
End: 2019-08-08
Attending: INTERNAL MEDICINE
Payer: COMMERCIAL

## 2019-08-08 DIAGNOSIS — G40.909 NONINTRACTABLE EPILEPSY WITHOUT STATUS EPILEPTICUS, UNSPECIFIED EPILEPSY TYPE (HCC): ICD-10-CM

## 2019-08-08 DIAGNOSIS — T42.0X4A: ICD-10-CM

## 2019-08-08 DIAGNOSIS — T42.0X4A: Primary | ICD-10-CM

## 2019-08-08 LAB
ALBUMIN SERPL BCP-MCNC: 3.1 G/DL (ref 3.5–5)
ALP SERPL-CCNC: 75 U/L (ref 46–116)
ALT SERPL W P-5'-P-CCNC: 25 U/L (ref 12–78)
ANION GAP SERPL CALCULATED.3IONS-SCNC: 11 MMOL/L (ref 4–13)
AST SERPL W P-5'-P-CCNC: 14 U/L (ref 5–45)
BILIRUB SERPL-MCNC: 0.2 MG/DL (ref 0.2–1)
BUN SERPL-MCNC: 10 MG/DL (ref 5–25)
CALCIUM SERPL-MCNC: 8.6 MG/DL (ref 8.3–10.1)
CHLORIDE SERPL-SCNC: 105 MMOL/L (ref 100–108)
CO2 SERPL-SCNC: 27 MMOL/L (ref 21–32)
CREAT SERPL-MCNC: 0.73 MG/DL (ref 0.6–1.3)
GFR SERPL CREATININE-BSD FRML MDRD: 101 ML/MIN/1.73SQ M
GLUCOSE SERPL-MCNC: 90 MG/DL (ref 65–140)
PHENYTOIN SERPL-MCNC: <0.4 UG/ML (ref 10–20)
POTASSIUM SERPL-SCNC: 3.5 MMOL/L (ref 3.5–5.3)
PROT SERPL-MCNC: 6.7 G/DL (ref 6.4–8.2)
SODIUM SERPL-SCNC: 143 MMOL/L (ref 136–145)
VALPROATE SERPL-MCNC: 74 UG/ML (ref 50–100)

## 2019-08-08 PROCEDURE — 80053 COMPREHEN METABOLIC PANEL: CPT

## 2019-08-08 PROCEDURE — 80164 ASSAY DIPROPYLACETIC ACD TOT: CPT

## 2019-08-08 PROCEDURE — 80185 ASSAY OF PHENYTOIN TOTAL: CPT

## 2019-08-08 PROCEDURE — 36415 COLL VENOUS BLD VENIPUNCTURE: CPT

## 2019-09-06 ENCOUNTER — APPOINTMENT (EMERGENCY)
Dept: RADIOLOGY | Facility: HOSPITAL | Age: 59
End: 2019-09-06
Payer: COMMERCIAL

## 2019-09-06 ENCOUNTER — APPOINTMENT (EMERGENCY)
Dept: CT IMAGING | Facility: HOSPITAL | Age: 59
End: 2019-09-06
Payer: COMMERCIAL

## 2019-09-06 ENCOUNTER — HOSPITAL ENCOUNTER (OUTPATIENT)
Facility: HOSPITAL | Age: 59
Setting detail: OBSERVATION
Discharge: HOME | End: 2019-09-07
Attending: EMERGENCY MEDICINE | Admitting: FAMILY MEDICINE
Payer: COMMERCIAL

## 2019-09-06 DIAGNOSIS — F41.9 ANXIETY: Chronic | ICD-10-CM

## 2019-09-06 DIAGNOSIS — R55 SYNCOPE: ICD-10-CM

## 2019-09-06 DIAGNOSIS — S01.01XA LACERATION OF SCALP: ICD-10-CM

## 2019-09-06 DIAGNOSIS — G40.909 SEIZURE DISORDER (HCC): Chronic | ICD-10-CM

## 2019-09-06 DIAGNOSIS — S09.90XA HEAD INJURY: Primary | ICD-10-CM

## 2019-09-06 LAB
ALBUMIN SERPL BCP-MCNC: 3.5 G/DL (ref 3.5–5)
ALP SERPL-CCNC: 75 U/L (ref 46–116)
ALT SERPL W P-5'-P-CCNC: 25 U/L (ref 12–78)
ANION GAP SERPL CALCULATED.3IONS-SCNC: 4 MMOL/L (ref 4–13)
APTT PPP: 25 SECONDS (ref 23–37)
AST SERPL W P-5'-P-CCNC: 39 U/L (ref 5–45)
ATRIAL RATE: 81 BPM
BASOPHILS # BLD AUTO: 0.02 THOUSANDS/ΜL (ref 0–0.1)
BASOPHILS NFR BLD AUTO: 0 % (ref 0–1)
BILIRUB SERPL-MCNC: 0.6 MG/DL (ref 0.2–1)
BUN SERPL-MCNC: 13 MG/DL (ref 5–25)
CALCIUM SERPL-MCNC: 9.2 MG/DL (ref 8.3–10.1)
CHLORIDE SERPL-SCNC: 103 MMOL/L (ref 100–108)
CLARITY, POC: CLEAR
CO2 SERPL-SCNC: 32 MMOL/L (ref 21–32)
COLOR, POC: NORMAL
CREAT SERPL-MCNC: 0.83 MG/DL (ref 0.6–1.3)
EOSINOPHIL # BLD AUTO: 0.01 THOUSAND/ΜL (ref 0–0.61)
EOSINOPHIL NFR BLD AUTO: 0 % (ref 0–6)
ERYTHROCYTE [DISTWIDTH] IN BLOOD BY AUTOMATED COUNT: 12.6 % (ref 11.6–15.1)
EXT BILIRUBIN, UA: NORMAL
EXT BLOOD URINE: NORMAL
EXT GLUCOSE, UA: NORMAL
EXT KETONES: NORMAL
EXT NITRITE, UA: NORMAL
EXT PH, UA: 6
EXT PROTEIN, UA: NORMAL
EXT SPECIFIC GRAVITY, UA: 1.02
EXT UROBILINOGEN: 0.2
GFR SERPL CREATININE-BSD FRML MDRD: 96 ML/MIN/1.73SQ M
GLUCOSE SERPL-MCNC: 93 MG/DL (ref 65–140)
HCT VFR BLD AUTO: 38.8 % (ref 36.5–49.3)
HGB BLD-MCNC: 12.9 G/DL (ref 12–17)
IMM GRANULOCYTES # BLD AUTO: 0.02 THOUSAND/UL (ref 0–0.2)
IMM GRANULOCYTES NFR BLD AUTO: 0 % (ref 0–2)
INR PPP: 1.02 (ref 0.84–1.19)
LYMPHOCYTES # BLD AUTO: 0.78 THOUSANDS/ΜL (ref 0.6–4.47)
LYMPHOCYTES NFR BLD AUTO: 9 % (ref 14–44)
MCH RBC QN AUTO: 33.6 PG (ref 26.8–34.3)
MCHC RBC AUTO-ENTMCNC: 33.2 G/DL (ref 31.4–37.4)
MCV RBC AUTO: 101 FL (ref 82–98)
MONOCYTES # BLD AUTO: 0.79 THOUSAND/ΜL (ref 0.17–1.22)
MONOCYTES NFR BLD AUTO: 9 % (ref 4–12)
NEUTROPHILS # BLD AUTO: 7.06 THOUSANDS/ΜL (ref 1.85–7.62)
NEUTS SEG NFR BLD AUTO: 82 % (ref 43–75)
NRBC BLD AUTO-RTO: 0 /100 WBCS
P AXIS: 82 DEGREES
PLATELET # BLD AUTO: 150 THOUSANDS/UL (ref 149–390)
PMV BLD AUTO: 10.6 FL (ref 8.9–12.7)
POTASSIUM SERPL-SCNC: 4.9 MMOL/L (ref 3.5–5.3)
PR INTERVAL: 136 MS
PROT SERPL-MCNC: 7.5 G/DL (ref 6.4–8.2)
PROTHROMBIN TIME: 13.1 SECONDS (ref 11.6–14.5)
QRS AXIS: 77 DEGREES
QRSD INTERVAL: 78 MS
QT INTERVAL: 362 MS
QTC INTERVAL: 420 MS
RBC # BLD AUTO: 3.84 MILLION/UL (ref 3.88–5.62)
SODIUM SERPL-SCNC: 139 MMOL/L (ref 136–145)
T WAVE AXIS: 41 DEGREES
TROPONIN I SERPL-MCNC: 0.04 NG/ML
VALPROATE SERPL-MCNC: 96 UG/ML (ref 50–100)
VENTRICULAR RATE: 81 BPM
WBC # BLD AUTO: 8.68 THOUSAND/UL (ref 4.31–10.16)
WBC # BLD EST: NORMAL 10*3/UL

## 2019-09-06 PROCEDURE — 80053 COMPREHEN METABOLIC PANEL: CPT | Performed by: PHYSICIAN ASSISTANT

## 2019-09-06 PROCEDURE — 81002 URINALYSIS NONAUTO W/O SCOPE: CPT | Performed by: PHYSICIAN ASSISTANT

## 2019-09-06 PROCEDURE — 93010 ELECTROCARDIOGRAM REPORT: CPT | Performed by: INTERNAL MEDICINE

## 2019-09-06 PROCEDURE — 80164 ASSAY DIPROPYLACETIC ACD TOT: CPT | Performed by: PHYSICIAN ASSISTANT

## 2019-09-06 PROCEDURE — 71046 X-RAY EXAM CHEST 2 VIEWS: CPT

## 2019-09-06 PROCEDURE — 84484 ASSAY OF TROPONIN QUANT: CPT | Performed by: PHYSICIAN ASSISTANT

## 2019-09-06 PROCEDURE — 99285 EMERGENCY DEPT VISIT HI MDM: CPT

## 2019-09-06 PROCEDURE — 70450 CT HEAD/BRAIN W/O DYE: CPT

## 2019-09-06 PROCEDURE — 36415 COLL VENOUS BLD VENIPUNCTURE: CPT | Performed by: PHYSICIAN ASSISTANT

## 2019-09-06 PROCEDURE — 85025 COMPLETE CBC W/AUTO DIFF WBC: CPT | Performed by: PHYSICIAN ASSISTANT

## 2019-09-06 PROCEDURE — 85730 THROMBOPLASTIN TIME PARTIAL: CPT | Performed by: PHYSICIAN ASSISTANT

## 2019-09-06 PROCEDURE — 99285 EMERGENCY DEPT VISIT HI MDM: CPT | Performed by: PHYSICIAN ASSISTANT

## 2019-09-06 PROCEDURE — 12001 RPR S/N/AX/GEN/TRNK 2.5CM/<: CPT | Performed by: PHYSICIAN ASSISTANT

## 2019-09-06 PROCEDURE — 84484 ASSAY OF TROPONIN QUANT: CPT | Performed by: NURSE PRACTITIONER

## 2019-09-06 PROCEDURE — 85610 PROTHROMBIN TIME: CPT | Performed by: PHYSICIAN ASSISTANT

## 2019-09-06 PROCEDURE — 93005 ELECTROCARDIOGRAM TRACING: CPT

## 2019-09-06 PROCEDURE — 99220 PR INITIAL OBSERVATION CARE/DAY 70 MINUTES: CPT | Performed by: NURSE PRACTITIONER

## 2019-09-06 RX ORDER — ATORVASTATIN CALCIUM 40 MG/1
40 TABLET, FILM COATED ORAL
Status: DISCONTINUED | OUTPATIENT
Start: 2019-09-07 | End: 2019-09-07 | Stop reason: HOSPADM

## 2019-09-06 RX ORDER — TRAZODONE HYDROCHLORIDE 50 MG/1
50 TABLET ORAL
COMMUNITY
End: 2019-09-07 | Stop reason: HOSPADM

## 2019-09-06 RX ORDER — DIVALPROEX SODIUM 500 MG/1
1000 TABLET, EXTENDED RELEASE ORAL EVERY MORNING
Status: DISCONTINUED | OUTPATIENT
Start: 2019-09-07 | End: 2019-09-07 | Stop reason: HOSPADM

## 2019-09-06 RX ORDER — GINSENG 100 MG
1 CAPSULE ORAL ONCE
Status: COMPLETED | OUTPATIENT
Start: 2019-09-06 | End: 2019-09-06

## 2019-09-06 RX ORDER — NICOTINE 21 MG/24HR
1 PATCH, TRANSDERMAL 24 HOURS TRANSDERMAL DAILY
Status: DISCONTINUED | OUTPATIENT
Start: 2019-09-07 | End: 2019-09-07 | Stop reason: HOSPADM

## 2019-09-06 RX ORDER — DIVALPROEX SODIUM 250 MG/1
250 TABLET, DELAYED RELEASE ORAL
Status: DISCONTINUED | OUTPATIENT
Start: 2019-09-07 | End: 2019-09-06

## 2019-09-06 RX ORDER — DIVALPROEX SODIUM 500 MG/1
1500 TABLET, EXTENDED RELEASE ORAL
Status: DISCONTINUED | OUTPATIENT
Start: 2019-09-06 | End: 2019-09-07 | Stop reason: HOSPADM

## 2019-09-06 RX ORDER — SODIUM CHLORIDE, SODIUM GLUCONATE, SODIUM ACETATE, POTASSIUM CHLORIDE, MAGNESIUM CHLORIDE, SODIUM PHOSPHATE, DIBASIC, AND POTASSIUM PHOSPHATE .53; .5; .37; .037; .03; .012; .00082 G/100ML; G/100ML; G/100ML; G/100ML; G/100ML; G/100ML; G/100ML
1000 INJECTION, SOLUTION INTRAVENOUS ONCE
Status: COMPLETED | OUTPATIENT
Start: 2019-09-06 | End: 2019-09-07

## 2019-09-06 RX ORDER — SODIUM CHLORIDE 9 MG/ML
125 INJECTION, SOLUTION INTRAVENOUS CONTINUOUS
Status: DISCONTINUED | OUTPATIENT
Start: 2019-09-06 | End: 2019-09-06

## 2019-09-06 RX ORDER — LIDOCAINE HYDROCHLORIDE 10 MG/ML
5 INJECTION, SOLUTION EPIDURAL; INFILTRATION; INTRACAUDAL; PERINEURAL ONCE
Status: COMPLETED | OUTPATIENT
Start: 2019-09-06 | End: 2019-09-06

## 2019-09-06 RX ORDER — TRAZODONE HYDROCHLORIDE 50 MG/1
50 TABLET ORAL
Status: DISCONTINUED | OUTPATIENT
Start: 2019-09-06 | End: 2019-09-07 | Stop reason: HOSPADM

## 2019-09-06 RX ORDER — LORAZEPAM 1 MG/1
1 TABLET ORAL ONCE
Status: COMPLETED | OUTPATIENT
Start: 2019-09-06 | End: 2019-09-06

## 2019-09-06 RX ORDER — DIPHENHYDRAMINE HCL 25 MG
25 TABLET ORAL EVERY 6 HOURS PRN
COMMUNITY
End: 2020-08-15 | Stop reason: HOSPADM

## 2019-09-06 RX ORDER — DIVALPROEX SODIUM 250 MG/1
250 TABLET, DELAYED RELEASE ORAL
COMMUNITY
End: 2020-08-15 | Stop reason: HOSPADM

## 2019-09-06 RX ORDER — ROSUVASTATIN CALCIUM 20 MG/1
20 TABLET, COATED ORAL DAILY
Refills: 0 | COMMUNITY
Start: 2019-08-06

## 2019-09-06 RX ADMIN — DIVALPROEX SODIUM 1500 MG: 500 TABLET, EXTENDED RELEASE ORAL at 22:51

## 2019-09-06 RX ADMIN — LORAZEPAM 1 MG: 1 TABLET ORAL at 19:58

## 2019-09-06 RX ADMIN — LIDOCAINE HYDROCHLORIDE 5 ML: 10 INJECTION, SOLUTION EPIDURAL; INFILTRATION; INTRACAUDAL; PERINEURAL at 18:59

## 2019-09-06 RX ADMIN — TRAZODONE HYDROCHLORIDE 50 MG: 50 TABLET ORAL at 22:17

## 2019-09-06 RX ADMIN — BACITRACIN ZINC 1 SMALL APPLICATION: 500 OINTMENT TOPICAL at 18:59

## 2019-09-06 RX ADMIN — SODIUM CHLORIDE 125 ML/HR: 0.9 INJECTION, SOLUTION INTRAVENOUS at 20:09

## 2019-09-06 RX ADMIN — SODIUM CHLORIDE, SODIUM GLUCONATE, SODIUM ACETATE, POTASSIUM CHLORIDE, MAGNESIUM CHLORIDE, SODIUM PHOSPHATE, DIBASIC, AND POTASSIUM PHOSPHATE 1000 ML: .53; .5; .37; .037; .03; .012; .00082 INJECTION, SOLUTION INTRAVENOUS at 22:17

## 2019-09-06 NOTE — ED PROVIDER NOTES
H&P Exam - Trauma   Anne Milton 60 y o  male MRN: [id number]  Unit/Bed#: [facility] Room/[facility] Encounter: [id number]    Assessment/Plan   Trauma Alert: Trauma Acuity: Trauma Evaluation  Model of Arrival: Trauma Mode of Arrival: Other (Comment)(private vehicle) via    Trauma Team: Current Providers  Attending Provider: Jordon Alejandro DO  Physician Assistant: Michael Moreno PA-C  ED Technician: Ban Dsouza  Registered Nurse: El Casey RN  Consultants: None    Trauma Active Problems: head injury  Trauma Plan: CT head    Chief Complaint:   Chief Complaint   Patient presents with    Head Injury     To ED with c/o falling while in the bathroom approx 30 minutes  Patient states that he does not remember how he fell  States that he hit his head, c/o pain in sacrum and left arm       History of Present Illness   HPI:  Anne Milton is a 60 y o  male who presents with head laceration after a fall in the bathroom at home  Mechanism:Details of Incident: Pt was found yelling for help on floor of bathroom  Does not remember how he fell Injury Date: 09/06/19 Injury Time: 1649 Injury Occurence Location - [address] [place] [address] Way: [place]    Patient is a 59 y/o M with h/o TBI, seizures, chronic back pain that was brought to the ED after a possible syncopal event  Patient states he was in the bathroom on the toilet and does not remember what happened after that, but next thing he knew he was on the floor  He is not sure if he passed out  His roommate states the light over the sink was partially pulled off the wall  Patient has a laceration to scalp  He states tetanus is UTD  He denies any other injuries, but does have pain to left lower back  NO numbness/tingling  He denies chest pain or trouble breathing  Patient had his Dilantin discontinued in July due to toxicity  He states he was not started on any other medications  He states this is not similar to his previous seizures    His roommate states he was confused after the event  History provided by:  Patient  Fall   Mechanism of injury: fall    Injury location:  Head/neck  Head/neck injury location:  Scalp  Incident location:  Bathroom  Time since incident:  1 hour  Arrived directly from scene: yes    Fall:     Fall occurred:  Standing    Impact surface:  Hard floor    Point of impact:  Unable to specify  Protective equipment: none    Suspicion of alcohol use: no    Suspicion of drug use: no    Tetanus status:  Up to date  Prior to arrival data:     Bystander interventions:  None    Patient ambulatory at scene: yes      Blood loss:  Minimal    Responsiveness at scene:  Alert    Orientation at scene:  Person and place    Loss of consciousness: yes      Amnesic to event: yes      Immobilization:  None  Associated symptoms: back pain    Associated symptoms: no abdominal pain, no chest pain and no neck pain      Review of Systems   Constitutional: Negative for chills and fever  HENT: Negative  Eyes: Negative for visual disturbance  Respiratory: Positive for cough  Negative for shortness of breath  Cardiovascular: Negative for chest pain and leg swelling  Gastrointestinal: Negative for abdominal pain  Genitourinary: Negative for dysuria  Musculoskeletal: Positive for back pain  Negative for neck pain  Skin: Positive for wound  Neurological: Positive for dizziness and syncope  Negative for weakness and light-headedness  Psychiatric/Behavioral: Positive for confusion  All other systems reviewed and are negative  Historical Information     Immunizations: There is no immunization history on file for this patient  Past Medical History:   Diagnosis Date    Anxiety     Back pain     History of herniated intervertebral disc     Seizures (HCC)     TBI [facility]traumatic [facility] injury) ([facility] [address] )      History reviewed  No pertinent family history    Past Surgical History:   Procedure Laterality Date    BACK SURGERY      BRAIN SURGERY      LEG SURGERY         Social History     Socioeconomic History    Marital status: Single     Spouse name: None    Number of children: None    Years of education: None    Highest education level: None   Occupational History    None   Social Needs    Financial resource strain: None    Food insecurity:     Worry: None     Inability: None    Transportation needs:     Medical: None     Non-medical: None   Tobacco Use    Smoking status: Current Every Day Smoker     Packs/day: 1 00     Types: Cigarettes    Smokeless tobacco: Current User    Tobacco comment: 14 years   Substance and Sexual Activity    Alcohol use: Never     Frequency: Never     Binge frequency: Never    Drug use: No    Sexual activity: None   Lifestyle    Physical activity:     Days per week: None     Minutes per session: None    Stress: None   Relationships    Social connections:     Talks on phone: None     Gets together: None     Attends Orthodoxy service: None     Active member of club or organization: None     Attends meetings of clubs or organizations: None     Relationship status: None    Intimate partner violence:     Fear of current or ex partner: None     Emotionally abused: None     Physically abused: None     Forced sexual activity: None   Other Topics Concern    None   Social History Narrative    None       Family History: non-contributory    Meds/Allergies   Prior to Admission Medications   Prescriptions Last Dose Informant Patient Reported? Taking? Cholecalciferol (VITAMIN D3) 2000 units capsule   Yes No   Sig: Take 2,000 Units by mouth daily   LORazepam (ATIVAN) 1 mg tablet   Yes No   Sig: Take 0 5 mg by mouth every 8 (eight) hours as needed for anxiety   divalproex sodium (DEPAKOTE ER) 500 mg 24 hr tablet   No No   Sig: Patient takes 2 tablets in the morning, 3 tablets in the evening     oxyCODONE (ROXICODONE) 15 mg immediate release tablet   Yes No   Sig: Take 15 mg by mouth 6 (six) times a day   rosuvastatin (CRESTOR) 20 MG tablet   Yes No   Sig: Take 20 mg by mouth daily   traZODone (DESYREL) 50 mg tablet  Self Yes Yes   Sig: Take 50 mg by mouth daily at bedtime      Facility-Administered Medications: None       Allergies   Allergen Reactions    Penicillins Anaphylaxis    Amoxicillin        PHYSICAL EXAM    PE limited by: nothing    Objective   Vitals:   First set: Temperature: 98 °F (36 7 °C) (09/06/19 1738)  Pulse: 81 (09/06/19 1738)  Respirations: 20 (09/06/19 1738)  Blood Pressure: 113/64 (09/06/19 1738)  SpO2: 95 % (09/06/19 1738)    Primary Survey:   (A) Airway: patent  (B) Breathing: normal  (C) Circulation: Pulses:   normal  (D) Disabliity:  GCS Total:  15  (E) Expose:  Completed    Secondary Survey: (Click on Physical Exam tab above)  Physical Exam   Constitutional: He appears well-developed and well-nourished  He is cooperative  He does not appear ill  No distress  HENT:   Head: Normocephalic  Right Ear: Hearing and tympanic membrane normal    Left Ear: Hearing and tympanic membrane normal    Nose: Nose normal    Mouth/Throat: Oropharynx is clear and moist and mucous membranes are normal    Eyes: Pupils are equal, round, and reactive to light  Conjunctivae and EOM are normal    Neck: Normal range of motion  No spinous process tenderness and no muscular tenderness present  Cardiovascular: Normal rate, regular rhythm and normal heart sounds  No murmur heard  Pulmonary/Chest: Effort normal  He has no wheezes  He has rhonchi in the right lower field and the left lower field  Musculoskeletal:        Thoracic back: Normal         Lumbar back: He exhibits tenderness (left lower back  )  He exhibits no bony tenderness, no swelling and no deformity  B/L UE and LE FROM, nontender to palpation  Neurological: He is alert  He has normal strength  He is not disoriented  No cranial nerve deficit or sensory deficit  GCS eye subscore is 4  GCS verbal subscore is 5  GCS motor subscore is 6  Skin: Skin is warm and dry  Laceration (2cm linear laceration to scalp, not actively bleeding, will require sutures  ) noted  No rash noted  He is not diaphoretic  No pallor  Psychiatric: His mood appears anxious  Nursing note and vitals reviewed        Invasive Devices     Peripheral Intravenous Line            Peripheral IV 09/06/19 Right Antecubital less than 1 day                Lab Results:   Results Reviewed     Procedure Component Value Units Date/Time    Troponin I [[id number]]     Lab Status:  No result Specimen:  Blood     POCT urinalysis dipstick [[id number]]  (Normal) Resulted:  09/06/19 1942    Lab Status:  Final result Specimen:  Urine Updated:  09/06/19 1943     Color, UA concentrated     Clarity, UA clear     Glucose, UA (Ref: Negative) neg     Bilirubin, UA (Ref: Negative) neg     Ketones, UA (Ref: Negative) neg     Spec Grav, UA (Ref:1 003-1 030) 1 020     Blood, UA (Ref: Negative) neg     pH, UA (Ref: 4 5-8 0) 6 0     Protein, UA (Ref: Negative) neg     Urobilinogen, UA (Ref: 0 2- 1 0) 0 2      Leukocytes, UA (Ref: Negative) neg     Nitrite, UA (Ref: Negative) neg    CBC and differential [[id number]]  (Abnormal) Collected:  09/06/19 1854    Lab Status:  Final result Specimen:  Blood from Arm, Right Updated:  09/06/19 1911     WBC 8 68 Thousand/uL      RBC 3 84 Million/uL      Hemoglobin 12 9 g/dL      Hematocrit 38 8 %       fL      MCH 33 6 pg      MCHC 33 2 g/dL      RDW 12 6 %      MPV 10 6 fL      Platelets 861 Thousands/uL      nRBC 0 /100 WBCs      Neutrophils Relative 82 %      Immat GRANS % 0 %      Lymphocytes Relative 9 %      Monocytes Relative 9 %      Eosinophils Relative 0 %      Basophils Relative 0 %      Neutrophils Absolute 7 06 Thousands/µL      Immature Grans Absolute 0 02 Thousand/uL      Lymphocytes Absolute 0 78 Thousands/µL      Monocytes Absolute 0 79 Thousand/µL      Eosinophils Absolute 0 01 Thousand/µL      Basophils Absolute 0 02 Thousands/µL     Comprehensive metabolic panel [[id number]] Collected:  09/06/19 1818    Lab Status:  Final result Specimen:  Blood from Arm, Right Updated:  09/06/19 1849     Sodium 139 mmol/L      Potassium 4 9 mmol/L      Chloride 103 mmol/L      CO2 32 mmol/L      ANION GAP 4 mmol/L      BUN 13 mg/dL      Creatinine 0 83 mg/dL      Glucose 93 mg/dL      Calcium 9 2 mg/dL      AST 39 U/L      ALT 25 U/L      Alkaline Phosphatase 75 U/L      Total Protein 7 5 g/dL      Albumin 3 5 g/dL      Total Bilirubin 0 60 mg/dL      eGFR 96 ml/min/1 73sq m     Narrative:       [facility] guidelines for Chronic Kidney Disease (CKD):     Stage 1 with normal or high GFR (GFR > 90 mL/min/1 73 square meters)    Stage 2 Mild CKD (GFR = 60-89 mL/min/1 73 square meters)    Stage 3A Moderate CKD (GFR = 45-59 mL/min/1 73 square meters)    Stage 3B Moderate CKD (GFR = 30-44 mL/min/1 73 square meters)    Stage 4 Severe CKD (GFR = 15-29 mL/min/1 73 square meters)    Stage 5 End Stage CKD (GFR <15 mL/min/1 73 square meters)  Note: GFR calculation is accurate only with a steady state creatinine    Valproic acid level, total [[id number]]  (Normal) Collected:  09/06/19 1818    Lab Status:  Final result Specimen:  Blood from Arm, Right Updated:  09/06/19 1849     Valproic Acid, Total 96 ug/mL     Troponin I [[id number]]  (Normal) Collected:  09/06/19 1818    Lab Status:  Final result Specimen:  Blood from Arm, Right Updated:  09/06/19 1847     Troponin I 0 04 ng/mL     Protime-INR [[id number]]  (Normal) Collected:  09/06/19 1818    Lab Status:  Final result Specimen:  Blood from Arm, Right Updated:  09/06/19 1840     Protime 13 1 seconds      INR 1 02    APTT [[id number]]  (Normal) Collected:  09/06/19 1818    Lab Status:  Final result Specimen:  Blood from Arm, Right Updated:  09/06/19 1840     PTT 25 seconds                  Imaging Studies:   Direct to CT: No  XR chest 2 views   ED Interpretation by Mario Claros PA-C (09/06 1857)   No acute abnormality         CT head without contrast   Final Result by Korin Ahumada MD (09/06 1840)      No acute intracranial abnormality  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  Workstation performed: [id number]             Other Studies: cbc, cmp, trop, ekg, depakote level  Code Status: Prior  Advance Directive and Living Will:      Power of :    POLST:      Procedures  Laceration repair  Date/Time: 9/6/2019 7:00 PM  Performed by: Dinesh Lara PA-C  Authorized by: Dinesh Lara PA-C   Consent: Verbal consent obtained  Risks and benefits: risks, benefits and alternatives were discussed  Consent given by: patient  Body area: head/neck  Location details: scalp  Laceration length: 2 cm  Foreign bodies: no foreign bodies  Anesthesia: local infiltration    Anesthesia:  Local Anesthetic: lidocaine 1% without epinephrine  Anesthetic total: 1 mL      Procedure Details:  Preparation: Patient was prepped and draped in the usual sterile fashion    Irrigation solution: saline  Irrigation method: syringe  Amount of cleaning: standard  Debridement: none  Degree of undermining: none  Skin closure: 4-0 nylon  Number of sutures: 3  Technique: simple  Approximation: close  Approximation difficulty: simple  Dressing: antibiotic ointment  Patient tolerance: Patient tolerated the procedure well with no immediate complications    ECG 12 Lead Documentation Only  Date/Time: 9/6/2019 5:30 PM  Performed by: Dinesh Lara PA-C  Authorized by: Dinesh Lara PA-C     Indications / Diagnosis:  Syncope  ECG reviewed by me, the ED Provider: yes    Patient location:  ED  Previous ECG:     Previous ECG:  Compared to current    Similarity:  No change  Quality:     Tracing quality:  Limited by artifact  Rate:     ECG rate:  81  Rhythm:     Rhythm: sinus rhythm    ST segments:     ST segments:  Normal  T waves:     T waves: normal               ED Course         MDM  Number of Diagnoses or Management Options  Head injury: new and requires workup  Laceration of scalp: new and does not require workup  Syncope: new and requires workup  Diagnosis management comments: Patient with head injury, questionable syncopal event, will check labs, CT head to r/o brain hemorrhage, electrolyte abnormality, cardiac or pulmonary disease, anemia  Will admit patient for observation due to possible syncope vs seizure  Amount and/or Complexity of Data Reviewed  Clinical lab tests: ordered and reviewed  Tests in the radiology section of CPT®: ordered and reviewed  Discuss the patient with other providers: yes (Sandee Cortes)    Patient Progress  Patient progress: stable      Disposition  [facility] Transfer: No  Final diagnoses:   Head injury   Syncope   Laceration of scalp     Time reflects when diagnosis was documented in both MDM as applicable and the Disposition within this note     Time User Action Codes Description Comment    9/6/2019  8:06 PM [place] Sins Add [S09 90XA] Head injury     9/6/2019  8:06 PM [place] Sins Add [R55] Syncope     9/6/2019  8:06 PM [place] Sins Add [S01 01XA] Laceration of scalp       ED Disposition     ED Disposition Condition Date/Time Comment    Admit Stable Fri Sep 6, 2019  8:05 PM Case was discussed with Sandee Cortes and the patient's admission status was agreed to be Admission Status: observation status to the service of Dr Radha Keita   Follow-up Information    None       Patient's Medications   Discharge Prescriptions    No medications on file     No discharge procedures on file        ED Provider  Electronically Signed by         Galina Alicia PA-C  09/06/19 5572

## 2019-09-07 VITALS
WEIGHT: 142 LBS | RESPIRATION RATE: 18 BRPM | OXYGEN SATURATION: 96 % | TEMPERATURE: 98 F | SYSTOLIC BLOOD PRESSURE: 97 MMHG | HEIGHT: 72 IN | BODY MASS INDEX: 19.23 KG/M2 | HEART RATE: 78 BPM | DIASTOLIC BLOOD PRESSURE: 56 MMHG

## 2019-09-07 PROBLEM — M54.9 CHRONIC BACK PAIN: Chronic | Status: ACTIVE | Noted: 2019-09-07

## 2019-09-07 PROBLEM — G89.29 CHRONIC BACK PAIN: Chronic | Status: ACTIVE | Noted: 2019-09-07

## 2019-09-07 PROBLEM — F41.9 ANXIETY: Chronic | Status: ACTIVE | Noted: 2019-09-07

## 2019-09-07 LAB
ANION GAP SERPL CALCULATED.3IONS-SCNC: 5 MMOL/L (ref 4–13)
BUN SERPL-MCNC: 11 MG/DL (ref 5–25)
CALCIUM SERPL-MCNC: 8.1 MG/DL (ref 8.3–10.1)
CHLORIDE SERPL-SCNC: 104 MMOL/L (ref 100–108)
CO2 SERPL-SCNC: 30 MMOL/L (ref 21–32)
CREAT SERPL-MCNC: 0.56 MG/DL (ref 0.6–1.3)
ERYTHROCYTE [DISTWIDTH] IN BLOOD BY AUTOMATED COUNT: 12.5 % (ref 11.6–15.1)
GFR SERPL CREATININE-BSD FRML MDRD: 112 ML/MIN/1.73SQ M
GLUCOSE P FAST SERPL-MCNC: 105 MG/DL (ref 65–99)
GLUCOSE SERPL-MCNC: 105 MG/DL (ref 65–140)
HCT VFR BLD AUTO: 38.1 % (ref 36.5–49.3)
HGB BLD-MCNC: 12.9 G/DL (ref 12–17)
MAGNESIUM SERPL-MCNC: 1.9 MG/DL (ref 1.6–2.6)
MCH RBC QN AUTO: 33.7 PG (ref 26.8–34.3)
MCHC RBC AUTO-ENTMCNC: 33.9 G/DL (ref 31.4–37.4)
MCV RBC AUTO: 100 FL (ref 82–98)
PHOSPHATE SERPL-MCNC: 3.5 MG/DL (ref 2.3–4.1)
PLATELET # BLD AUTO: 106 THOUSANDS/UL (ref 149–390)
PMV BLD AUTO: 11.8 FL (ref 8.9–12.7)
POTASSIUM SERPL-SCNC: 3.6 MMOL/L (ref 3.5–5.3)
RBC # BLD AUTO: 3.83 MILLION/UL (ref 3.88–5.62)
SODIUM SERPL-SCNC: 139 MMOL/L (ref 136–145)
TROPONIN I SERPL-MCNC: 0.04 NG/ML
TROPONIN I SERPL-MCNC: 0.06 NG/ML
WBC # BLD AUTO: 6.09 THOUSAND/UL (ref 4.31–10.16)

## 2019-09-07 PROCEDURE — 93005 ELECTROCARDIOGRAM TRACING: CPT

## 2019-09-07 PROCEDURE — 84484 ASSAY OF TROPONIN QUANT: CPT | Performed by: NURSE PRACTITIONER

## 2019-09-07 PROCEDURE — 99204 OFFICE O/P NEW MOD 45 MIN: CPT | Performed by: PSYCHIATRY & NEUROLOGY

## 2019-09-07 PROCEDURE — 85027 COMPLETE CBC AUTOMATED: CPT | Performed by: NURSE PRACTITIONER

## 2019-09-07 PROCEDURE — 80048 BASIC METABOLIC PNL TOTAL CA: CPT | Performed by: NURSE PRACTITIONER

## 2019-09-07 PROCEDURE — 83735 ASSAY OF MAGNESIUM: CPT | Performed by: NURSE PRACTITIONER

## 2019-09-07 PROCEDURE — 99217 PR OBSERVATION CARE DISCHARGE MANAGEMENT: CPT | Performed by: INTERNAL MEDICINE

## 2019-09-07 PROCEDURE — 84100 ASSAY OF PHOSPHORUS: CPT | Performed by: NURSE PRACTITIONER

## 2019-09-07 RX ORDER — PHENYTOIN 50 MG/1
TABLET, CHEWABLE ORAL
Qty: 150 TABLET | Refills: 0 | Status: SHIPPED | OUTPATIENT
Start: 2019-09-07 | End: 2019-09-07 | Stop reason: SDUPTHER

## 2019-09-07 RX ORDER — LORAZEPAM 0.5 MG/1
0.5 TABLET ORAL
Qty: 10 TABLET | Refills: 0 | Status: SHIPPED | OUTPATIENT
Start: 2019-09-07 | End: 2020-08-15 | Stop reason: HOSPADM

## 2019-09-07 RX ORDER — PHENYTOIN 50 MG/1
TABLET, CHEWABLE ORAL
Qty: 150 TABLET | Refills: 0 | Status: SHIPPED | OUTPATIENT
Start: 2019-09-07 | End: 2020-08-15 | Stop reason: HOSPADM

## 2019-09-07 RX ORDER — LORAZEPAM 0.5 MG/1
0.5 TABLET ORAL EVERY 8 HOURS PRN
Status: DISCONTINUED | OUTPATIENT
Start: 2019-09-07 | End: 2019-09-07 | Stop reason: HOSPADM

## 2019-09-07 RX ORDER — OXYCODONE HYDROCHLORIDE 5 MG/1
5 TABLET ORAL EVERY 4 HOURS PRN
Status: DISCONTINUED | OUTPATIENT
Start: 2019-09-07 | End: 2019-09-07 | Stop reason: HOSPADM

## 2019-09-07 RX ORDER — ACETAMINOPHEN 325 MG/1
650 TABLET ORAL EVERY 6 HOURS PRN
Status: DISCONTINUED | OUTPATIENT
Start: 2019-09-07 | End: 2019-09-07 | Stop reason: HOSPADM

## 2019-09-07 RX ADMIN — ATORVASTATIN CALCIUM 40 MG: 40 TABLET, FILM COATED ORAL at 18:08

## 2019-09-07 RX ADMIN — OXYCODONE HYDROCHLORIDE 5 MG: 5 TABLET ORAL at 13:12

## 2019-09-07 RX ADMIN — OXYCODONE HYDROCHLORIDE 5 MG: 5 TABLET ORAL at 04:36

## 2019-09-07 RX ADMIN — DIVALPROEX SODIUM 1000 MG: 500 TABLET, EXTENDED RELEASE ORAL at 08:18

## 2019-09-07 RX ADMIN — NICOTINE 1 PATCH: 14 PATCH TRANSDERMAL at 08:18

## 2019-09-07 RX ADMIN — OXYCODONE HYDROCHLORIDE 5 MG: 5 TABLET ORAL at 18:08

## 2019-09-07 NOTE — ASSESSMENT & PLAN NOTE
Differential diagnosis including but not limited to: vasovagal syncope, cardiac arrhythmia, metabolic abnormality, seizure and dehydration  Disposition:  Admit to medical-surgical unit with telemetry monitoring    Awaiting echocardiogram    Troponin negative x3  EKG now and as needed  Orthostatic vital sign Now and Q shift    Neuro checks q 4 hours  Nicotine patch  Respiratory:  No acute issues at this time  SpO2 of greater than 92%  FEN: IVF hydration  Replete electrolytes with goals: K >4 0, Mag >2 0, and Phos >3 0  Regular diet as tolerated  : UA negative    I&Os

## 2019-09-07 NOTE — H&P
H&PJared Meter 1960, 60 y o  male MRN: [id number]    Unit/Bed#: [address] [place] [address] Encounter: [id number]    Primary Care Provider: Leda Marie DO   Date and time admitted to hospital: 9/6/2019  5:28 PM        * Syncope  Assessment & Plan  Differential diagnosis including but not limited to: vasovagal syncope, cardiac arrhythmia, metabolic abnormality, seizure and dehydration  Disposition:  Admit to medical-surgical unit with telemetry monitoring    Awaiting echocardiogram    Troponin negative x3  EKG now and as needed  Orthostatic vital sign Now and Q shift    Neuro checks q 4 hours  Nicotine patch  Respiratory:  No acute issues at this time  SpO2 of greater than 92%  FEN: IVF hydration  Replete electrolytes with goals: K >4 0, Mag >2 0, and Phos >3 0  Regular diet as tolerated  : UA negative  I&Os    Chronic back pain  Assessment & Plan  Tylenol 650 mg p o  Q 6 hours as needed for mild pain  Oxycodone 5 mg p o  Q 4 hours for moderate pain    Anxiety  Assessment & Plan  Ativan 0 5 mg p o  Q 8 hours as needed    Laceration of scalp  Assessment & Plan  · S/p suture to laceration of scalp by ED provider  · Suture removal in 10 days by PCP  · Wound care is needed      Seizure disorder [facility])  Assessment & Plan  · Secondary to a traumatic brain injury  · Patient reports that this is not a typical presentation of his previous seizures and he does not feel so he had a seizure at this time  · Encourage outpatient follow-up with neurology   · Seizure precautions are in place  · Continue home dose of [id number] mg q  AM and 1500 mg q  HS  · Valproic acid level in therapeutic range    VTE Prophylaxis: Heparin  / sequential compression device   Code Status: Full code   POLST: There is no POLST form on file for this patient (pre-hospital)  Discussion with family: The patient (and any family present) verbalized understanding of the plan of care   Specifically highlighted areas of special concern regarding plan of care and highlighted treatment plan  All questions were answered prior to leaving room  Anticipated Length of Stay:  Patient will be admitted on an Observation basis with an anticipated length of stay of  > 2 midnights  Justification for Hospital Stay: Syncopal episode    Total Time for Visit, including Counseling / Coordination of Care: 30 minutes  Greater than 50% of this total time spent on direct patient counseling and coordination of care  Chief Complaint:   Syncopal episode    History of Present Illness:    Gunner Chan is a 60 y o  male past medical history TBI, seizures, chronic back pain and anxiety who presented to the emergency department for evaluation of syncopal episode  He reports that he awoke on the floor of the bathroom bleeding from his scalp, prior to this he was on the toilet having a bowel movement  He does not remember the events prior to him losing consciousness and striking his head  Denies previous syncopal episodes  Does have history of seizures and reports last seizure approximately 1 year ago, currently maintained on Depakote  Recent admission for phenytoin toxicity in July 2019 and phenytoin was not resumed at discharge  During his evaluation emergency department CT head showed no acute intracranial abnormality and EKG revealed no ST or T-wave changes  Laceration to scalp was sutured by ED provider  He denies chest pain/tenderness, shortness of breath, nausea, vomiting, changes in bladder or bowel  During my assessment and trauma evaluation he is AOx3 and BP is acceptable  We will admit to medical-surgical unit with telemetry monitoring in the setting of syncopal episode  Review of Systems:    Review of Systems   Constitutional: Negative  Negative for activity change, appetite change, chills, diaphoresis, fatigue, fever and unexpected weight change  HENT: Negative  Eyes: Negative for photophobia and visual disturbance     Respiratory: Negative  Cardiovascular: Negative  Gastrointestinal: Negative  Genitourinary: Negative  Musculoskeletal: Positive for back pain (CHRONIC)  Skin: Negative for color change, pallor, rash and wound  Allergic/Immunologic: Negative for immunocompromised state  Neurological: Positive for seizures (PmHX) and syncope  Negative for dizziness, tremors, facial asymmetry, speech difficulty, weakness, light-headedness, numbness and headaches  Psychiatric/Behavioral: Negative for agitation  All other systems reviewed and are negative  Past Medical and Surgical History:     Past Medical History:   Diagnosis Date    Anxiety     Back pain     History of herniated intervertebral disc     Seizures (HCC)     TBI (traumatic brain injury) ([facility] [address] )        Past Surgical History:   Procedure Laterality Date    BACK SURGERY      BRAIN SURGERY      LEG SURGERY         Meds/Allergies:    Prior to Admission medications    Medication Sig Start Date End Date Taking? Authorizing Provider   diphenhydrAMINE (BENADRYL) 25 mg tablet Take 25 mg by mouth every 6 (six) hours as needed for itching   Yes Historical Provider, MD   divalproex sodium (DEPAKOTE) 250 mg EC tablet Take 250 mg by mouth daily with dinner   Yes Historical Provider, MD   traZODone (DESYREL) 50 mg tablet Take 50 mg by mouth daily at bedtime   Yes Historical Provider, MD   Cholecalciferol (VITAMIN D3) 2000 units capsule Take 2,000 Units by mouth daily    Historical Provider, MD   divalproex sodium (DEPAKOTE ER) 500 mg 24 hr tablet Patient takes 2 tablets in the morning, 3 tablets in the evening   7/30/19   Luzma Anderson [place], MD   LORazepam (ATIVAN) 1 mg tablet Take 0 5 mg by mouth every 8 (eight) hours as needed for anxiety    Historical Provider, MD   oxyCODONE (ROXICODONE) 15 mg immediate release tablet Take 15 mg by mouth 6 (six) times a day    Historical Provider, MD   rosuvastatin (CRESTOR) 20 MG tablet Take 20 mg by mouth daily 8/6/19   Historical Provider, MD   ibuprofen (MOTRIN) 800 mg tablet Take 800 mg by mouth every 8 (eight) hours as needed for mild pain  9/6/19  Historical Provider, MD     I have reviewed home medications with patient personally  Allergies: Allergies   Allergen Reactions    Penicillins Anaphylaxis    Amoxicillin        Social History:     Marital Status: Single   Patient Pre-hospital Living Situation: independently  Patient Pre-hospital Level of Mobility: ambulate without difficulty, previously discharge with assistive device(wlaker), but reports that he has not needed assistance to ambulate  Patient Pre-hospital Diet Restrictions: none  Substance Use History:   Social History     Substance and Sexual Activity   Alcohol Use Never    Frequency: Never    Binge frequency: Never     Social History     Tobacco Use   Smoking Status Current Every Day Smoker    Packs/day: 1 00    Types: Cigarettes   Smokeless Tobacco Current User   Tobacco Comment    14 years     Social History     Substance and Sexual Activity   Drug Use No       Family History:    non-contributory    Physical Exam:     Vitals:   Blood Pressure: 117/62 (09/06/19 2011)  Pulse: 75 (09/06/19 2010)  Temperature: 98 °F (36 7 °C) (09/06/19 1738)  Temp Source: Tympanic (09/06/19 1738)  Respirations: 17 (09/06/19 2010)  Weight - Scale: 65 9 kg (145 lb 4 5 oz) (09/06/19 1731)  SpO2: 96 % (09/06/19 2010)    Physical Exam   Constitutional: He is oriented to person, place, and time  He appears well-developed and well-nourished  No distress  HENT:   Head: Normocephalic  Mouth/Throat: Oropharynx is clear and moist    laceration to scalp    Eyes: Pupils are equal, round, and reactive to light  Conjunctivae and EOM are normal  No scleral icterus  Neck: Normal range of motion  Neck supple  No JVD present  No tracheal deviation present  No thyromegaly present  Cardiovascular: Normal rate, regular rhythm, normal heart sounds and intact distal pulses   Exam reveals no gallop and no friction rub  No murmur heard  Pulmonary/Chest: Effort normal and breath sounds normal  No stridor  No respiratory distress  He has no wheezes  He has no rales  He exhibits no tenderness  Abdominal: Soft  Bowel sounds are normal  He exhibits no distension and no mass  There is no tenderness  There is no rebound and no guarding  Musculoskeletal: Normal range of motion  He exhibits no edema, tenderness or deformity  Trauma exam performed: GCS 15, full ROM of bilateral upper and lower extremities  Airway intact, bilateral breath sounds, palpable pulses  No active bleeding  No bony point tenderness in extremities, chest, abdomen or c/t,l spine  No crepitus, abdomen soft/non tender  Chest wall soft non tender with no deformities  Pelvis stable  Neurological: He is alert and oriented to person, place, and time  No cranial nerve deficit  Skin: Skin is warm and dry  Capillary refill takes less than 2 seconds  No rash noted  He is not diaphoretic  No erythema  No pallor  Psychiatric: He has a normal mood and affect  His behavior is normal    Nursing note and vitals reviewed  Additional Data:     Lab Results: I have personally reviewed pertinent reports  Results from last 7 days   Lab Units 09/06/19  1854   WBC Thousand/uL 8 68   HEMOGLOBIN g/dL 12 9   HEMATOCRIT % 38 8   PLATELETS Thousands/uL 150   NEUTROS PCT % 82*   LYMPHS PCT % 9*   MONOS PCT % 9   EOS PCT % 0     Results from last 7 days   Lab Units 09/06/19  1818   SODIUM mmol/L 139   POTASSIUM mmol/L 4 9   CHLORIDE mmol/L 103   CO2 mmol/L 32   BUN mg/dL 13   CREATININE mg/dL 0 83   ANION GAP mmol/L 4   CALCIUM mg/dL 9 2   ALBUMIN g/dL 3 5   TOTAL BILIRUBIN mg/dL 0 60   ALK PHOS U/L 75   ALT U/L 25   AST U/L 39   GLUCOSE RANDOM mg/dL 93     Results from last 7 days   Lab Units 09/06/19  1818   INR  1 02                   Imaging: I have personally reviewed pertinent reports        XR chest 2 views   ED Interpretation by Ambrosio Holley Bre Wilson PA-C (09/06 1857)   No acute abnormality  CT head without contrast   Final Result by Ashlee Forte MD (09/06 1840)      No acute intracranial abnormality  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

## 2019-09-07 NOTE — UTILIZATION REVIEW
Initial Clinical Review    Admission: Date/Time/Statement:  Observation 9/6 @ 2007  Orders Placed This Encounter   Procedures    Place in Observation (expected length of stay for this patient is less than two midnights)     Standing Status:   Standing     Number of Occurrences:   1     Order Specific Question:   Admitting Physician     Answer:   Johnson Berger [[id number]]     Order Specific Question:   Level of Care     Answer:   Med Surg [[id number]]     Order Specific Question:   Bed request comments     Answer:   telemetry     ED Arrival Information     Expected Arrival [address] of Arrival Escorted By Service Admission Type    - 9/6/2019 17:24 Urgent Wheelchair Friend General Medicine Urgent    Arrival Complaint    Head Injury        Chief Complaint   Patient presents with    Head Injury     To ED with c/o falling while in the bathroom approx 30 minutes  Patient states that he does not remember how he fell  States that he hit his head, c/o pain in sacrum and left arm     Assessment/Plan:   60 y o  male to ED presents with syncopal episode  He reports that he awoke on the floor of the bathroom bleeding from his scalp, prior to this he was on the toilet having a bowel movement  He does not remember the events prior to him losing consciousness and striking his head  Denies previous syncopal episodes  PMH of TBI, seizures, chronic back pain and anxiety  History of seizures and reports last seizure approximately 1 year ago, currently maintained on Depakote  Recent admission for phenytoin toxicity in July 2019 and phenytoin was not resumed at discharge  Laceration to scalp was sutured by ED  Admit Observation level of care for Syncope and Seizure disorder  Echo, trend troponin, orthostatic vitals, neurological checks q4h, IV fluids, seizure precautions, Continue home dose of [id number] mg q  AM and 1500 mg q  HS and Valproic acid level in therapeutic range   EKG is normal sinus rhythm    ED Triage Vitals [09/06/19 4682] Temperature Pulse Respirations Blood Pressure SpO2   98 °F (36 7 °C) 81 20 113/64 95 %      Temp Source Heart Rate Source Patient Position - Orthostatic VS BP Location FiO2 (%)   Tympanic Monitor Sitting Right arm --      Pain Score       5        Wt Readings from Last 1 Encounters:   09/06/19 64 4 kg (142 lb)     Additional Vital Signs:   Date/Time  Temp  Pulse  Resp  BP  SpO2  O2 Device    09/06/19 2228            None (Room air)    09/06/19 2209  98 5 °F (36 9 °C)  82  18  90/55  94 %  None (Room air)    09/06/19 20:11:32        117/62        09/06/19 20:10:35    75  17    96 %      09/06/19 1945    73  18  132/63  97 %        Pertinent Labs/Diagnostic Test Results:   9/6 CT Head - No acute intracranial abnormality    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      Results from last 7 days   Lab Units 09/07/19  0443 09/06/19  1854   WBC Thousand/uL 6 09 8 68   HEMOGLOBIN g/dL 12 9 12 9   HEMATOCRIT % 38 1 38 8   PLATELETS Thousands/uL 106* 150   NEUTROS ABS Thousands/µL  --  7 06         Results from last 7 days   Lab Units 09/07/19 0443 09/06/19  1818   SODIUM mmol/L 139 139   POTASSIUM mmol/L 3 6 4 9   CHLORIDE mmol/L 104 103   CO2 mmol/L 30 32   ANION GAP mmol/L 5 4   BUN mg/dL 11 13   CREATININE mg/dL 0 56* 0 83   EGFR ml/min/1 73sq m 112 96   CALCIUM mg/dL 8 1* 9 2   MAGNESIUM mg/dL 1 9  --    PHOSPHORUS mg/dL 3 5  --      Results from last 7 days   Lab Units 09/06/19  1818   AST U/L 39   ALT U/L 25   ALK PHOS U/L 75   TOTAL PROTEIN g/dL 7 5   ALBUMIN g/dL 3 5   TOTAL BILIRUBIN mg/dL 0 60         Results from last 7 days   Lab Units 09/07/19  0443 09/06/19  1818   GLUCOSE RANDOM mg/dL 105 93     Results from last 7 days   Lab Units 09/07/19  0443 09/06/19  2248 09/06/19  1818   TROPONIN I ng/mL 0 06* 0 04 0 04     Results from last 7 days   Lab Units 09/06/19  1818   PROTIME seconds 13 1   INR  1 02   PTT seconds 25     Results from last 7 days   Lab Units 09/06/19 1942   CLARITY UA  clear   COLOR UA  concentrated   SPEC GRAV US  1 020   PH UA  6 0   GLUCOSE UA  neg   KETONES UA  neg   BLOOD UA  neg   PROTEIN UA  neg   NITRITE UA  neg   BILIRUBIN, UA  neg   UROBILINOGEN UA  0 2   LEUKOCYTES UA  neg     ED Treatment:   Medication Administration from 09/06/2019 1724 to 09/06/2019 2054       Date/Time Order Dose Route Action     09/06/2019 1859 lidocaine (PF) (XYLOCAINE-MPF) 1 % injection 5 mL 5 mL Infiltration Given     09/06/2019 1859 bacitracin topical ointment 1 small application 1 small application Topical Given     09/06/2019 1958 LORazepam (ATIVAN) tablet 1 mg 1 mg Oral Given     09/06/2019 2009 sodium chloride 0 9 % infusion 125 mL/hr Intravenous New Bag        Past Medical History:   Diagnosis Date    Anxiety     Back pain     History of herniated intervertebral disc     Seizures (HCC)     TBI (traumatic brain injury) ([facility] [address] )      Present on Admission:   Syncope   Laceration of scalp   Seizure disorder (HCC)   Anxiety   Chronic back pain    Admitting Diagnosis: Syncope [R55]  Head injury [S09 90XA]  Head injury, closed [S09 90XA]  Laceration of scalp [S01 01XA]     Age/Sex: 60 y o  male     Admission Orders:  Echo  Tele monitoring  Neurological checks q4h  Seizure precautions    Current Facility-Administered Medications:  acetaminophen 650 mg Oral Q6H PRN   atorvastatin 40 mg Oral Daily With Dinner   divalproex sodium 1,000 mg Oral QAM   divalproex sodium 1,500 mg Oral HS   LORazepam 0 5 mg Oral Q8H PRN   nicotine 1 patch Transdermal Daily   oxyCODONE 5 mg Oral Q4H PRN  9/7 x1   traZODone 50 mg Oral HS     Network Utilization Review Department  Phone: [phone]; Fax [phone]  [email or web address]  ATTENTION: Please call with any questions or concerns to [phone]  and carefully listen to the prompts so that you are directed to the right person     Send all requests for admission clinical reviews, approved or denied determinations and any other requests to fax [phone]   All voicemails are confidential

## 2019-09-07 NOTE — DISCHARGE INSTRUCTIONS
Syncope   WHAT YOU NEED TO KNOW:   Syncope is also called fainting or passing out  Syncope is a sudden, temporary loss of consciousness, followed by a fall from a standing or sitting position  Syncope ranges from not serious to a sign of a more serious condition that needs to be treated  You can control some health conditions that cause syncope  Your healthcare providers can help you create a plan to manage syncope and prevent episodes  DISCHARGE INSTRUCTIONS:   Seek care immediately if:   · You are bleeding because you hit your head when you fainted  · You suddenly have double vision, difficulty speaking, numbness, and cannot move your arms or legs  · You have chest pain and trouble breathing  · You vomit blood or material that looks like coffee grounds  · You see blood in your bowel movement  Contact your healthcare provider if:   · You have new or worsening symptoms  · You have another syncope episode  · You have a headache, fast heartbeat, or feel too dizzy to stand up  · You have questions or concerns about your condition or care  Follow up with your healthcare provider as directed:  Write down your questions so you remember to ask them during your visits  Manage syncope:   · Keep a record of your syncope episodes  Include your symptoms and your activity before and after the episode  The record can help your healthcare provider find the cause of your syncope and help you manage episodes  · Sit or lie down when needed  This includes when you feel dizzy, your throat is getting tight, and your vision changes  Raise your legs above the level of your heart  · Take slow, deep breaths if you start to breathe faster with anxiety or fear  This can help decrease dizziness and the feeling that you might faint  · Check your blood pressure often  This is important if you take medicine to lower your blood pressure   Check your blood pressure when you are lying down and when you are standing  Ask how often to check during the day  Keep a record of your blood pressure numbers  Your healthcare provider may use the record to help plan your treatment  Prevent a syncope episode:   · Move slowly and let yourself get used to one position before you move to another position  This is very important when you change from a lying or sitting position to a standing position  Take some deep breaths before you stand up from a lying position  Stand up slowly  Sudden movements may cause a fainting spell  Sit on the side of the bed or couch for a few minutes before you stand up  If you are on bedrest, try to be upright for about 2 hours each day, or as directed  Do not lock your legs if you are standing for a long period of time  Move your legs and bend your knees to keep blood flowing  · Follow your healthcare provider's recommendations  Your provider may  recommend that you drink more liquids to prevent dehydration  You may also need to have more salt to keep your blood pressure from dropping too low and causing syncope  Your provider will tell you how much liquid and sodium to have each day  · Watch for signs of low blood sugar  These include hunger, nervousness, sweating, and fast or fluttery heartbeats  Talk with your healthcare provider about ways to keep your blood sugar level steady  · Do not strain if you are constipated  You may faint if you strain to have a bowel movement  Walking is the best way to get your bowels moving  Eat foods high in fiber to make it easier to have a bowel movement  Good examples are high-fiber cereals, beans, vegetables, and whole-grain breads  Prune juice may help make bowel movements softer  · Be careful in hot weather  Heat can cause a syncope episode  Limit activity done outside on hot days  Physical activity in hot weather can lead to dehydration  This can cause an episode    © 2017 [employer] Information is for End User's use only and may not be sold, redistributed or otherwise used for commercial purposes  All illustrations and images included in CareNotes® are the copyrighted property of A D A M , Inc  or Jorge A Woody  The above information is an  only  It is not intended as medical advice for individual conditions or treatments  Talk to your doctor, nurse or pharmacist before following any medical regimen to see if it is safe and effective for you

## 2019-09-07 NOTE — NURSING NOTE
Patient discharged to home  He was provided with written prescriptions for new medications  Unfortunately his home medications are locked up in pharmacy and it is now after hours  He was provided with enough depakote for tonight and tomorrow mornings doses  He understands that he can not be provided with Oxycodone to take home as it is a controlled substance  His other medications he will return to the pharmacy tomorrow morning when they open and he was given the bag slip (#[id number]) and also pharmacy phone number  The physician also sent in paperwork to [facility] for license revoke  I had a lengthy conversation with patient in regards to him not driving while his license is revoked  He states he understands he would put himself and other drivers at risk if he were to get behind the wheel  He states he has a good friend who can assist him with transportation for the time being  He was also provided with information on Myra Farris and [facility] as well as the contact information for [facility]  IV and telemetry d/c'd  He was also provided with smoking cessation paperwork  He was escorted to the ER entrance via wheelchair/PCA to meet his ride home

## 2019-09-07 NOTE — ASSESSMENT & PLAN NOTE
Disposition:  Admit to medical-surgical unit with telemetry monitoring    Awaiting echocardiogram    Troponin negative x3  EKG now and as needed  Orthostatic vital sign Now and Q shift    Neuro checks q 4 hours  Nicotine patch  Respiratory:  No acute issues at this time  SpO2 of greater than 92%  FEN: IVF hydration  Replete electrolytes with goals: K >4 0, Mag >2 0, and Phos >3 0  Regular diet as tolerated  : UA negative    I&Os

## 2019-09-07 NOTE — CONSULTS
PATIENT NAME: Tee Donohue OF BIRTH: 1960   MEDICAL RECORD NUMBER: [id number]  Chief Complaint/Reason for Consult: syncope     HPI: Ashely Tan is a 60 y o  male with history of anxiety, TBI with scondary seizure disorder, seen by neurology 7/29/19 with dizziness and ambulatory dysfunction for 2 days after having a fall  In the ER his dilantin level was significantly elevated at 37 12 after he had his phenytoin increased recently  Dilantin was held and plan was restart at a lower dose and have him f/u as an outpatient  Yesterday he came in after he had a syncopal episode  He woke up on the bathroom floor bleeding from his scalp  This was in the setting of a bowel movement  He has been seizure free for atleast a year but due to the above he has been off his dilantin, and maintained on depakote monotherapy  Depakote level yesterday was 96  He is feeling back to normal now  He takes depakote 1000 mg in AM/1500 mg in PM  Last seizure may have been 10 years ago  Of note his CT head shows encephalomalacia in the right hemisphere mainly right frontal temporal, some parietal involvement as well  (chronic)         PAST MEDICAL HISTORY  Past Medical History:   Diagnosis Date    Anxiety     Back pain     History of herniated intervertebral disc     Seizures (HCC)     TBI (traumatic brain injury) ([facility] [address] )         PAST SURGICAL HISTORY  Past Surgical History:   Procedure Laterality Date    BACK SURGERY      BRAIN SURGERY      LEG SURGERY          ALLERGIES: Penicillins and Amoxicillin     CURRENT MEDICATIONS  Scheduled Meds:  Current Facility-Administered Medications:  acetaminophen 650 mg Oral Q6H PRN ERIN Vogel   atorvastatin 40 mg Oral Daily With ERIN Dale   divalproex sodium 1,000 mg Oral QAM ERIN Santamaria   divalproex sodium 1,500 mg Oral HS ERIN Santamaria   LORazepam 0 5 mg Oral Q8H PRN ERIN Vogel   nicotine 1 patch Transdermal Daily ERIN Vogel oxyCODONE 5 mg Oral Q4H PRN ERIN Amin   traZODone 50 mg Oral HS ERIN Santamaria     Continuous Infusions:   PRN Meds:   acetaminophen    LORazepam    oxyCODONE     SOCIAL HISTORY   reports that he has been smoking cigarettes  He has been smoking about 1 00 pack per day  He uses smokeless tobacco  He reports that he does not drink alcohol or use drugs  FAMILY HISTORY  History reviewed  No pertinent family history  REVIEW OF SYSTEMS   Constitutional: Negative for fever, chills, diaphoresis, activity change and appetite change  HEENT: Negative for hearing loss, ear pain, facial swelling, neck pain, neck stiffness, tinnitus and ear discharge  Negative for ocular pain, discharge, redness and itching  Respiratory: Negative for apnea, chest tightness, shortness of breath, wheezing and stridor  Cardiovascular: Negative for chest pain, palpitations and leg swelling  Gastrointestinal: Negative for diarrhea, constipation and abdominal distention  Endocrine: Negative for cold intolerance and heat intolerance  Genitourinary: Negative for dysuria, urgency, frequency, hematuria, flank pain and difficulty urinating  Neurological: Negative for dizziness, seizures, syncope, facial asymmetry, speech difficulty, light-headedness, numbness and headaches  Hematological: Negative for adenopathy  Does not bruise/bleed easily  Psychiatric/Behavioral: Negative for behavioral problems and agitation  Otherwise complete review of systems is negative aside from what is mentioned above and in the HPI  PHYSICAL EXAMINATION  Temp:  [98 °F (36 7 °C)-98 5 °F (36 9 °C)] 98 3 °F (36 8 °C)  HR:  [70-86] 75  Resp:  [12-21] 18  BP: ()/(50-73) 94/63   General Examination: In no apparent distress, well developed and well nourished, and cooperative   HEENT: Normocephalic, Atraumatic  Moist mucus membranes  Anicteric  PPC    CVS: Regular rate and rhythm  S1 S2 noted  No audible murmurs  No carotids bruits  Peripheral pulses palpable throughout   Lungs: Clear to auscultation bilaterally  No rales, rhonchi, wheezing  Abdomen: Bowel sounds positive  Non- tender  Non-distended  No organomegaly  Ext: No edema   Psych: Thought content - No VH/AH  No delusions  Though Process - tangential    Skin - No rash    Neurological Examination:   Mental Status: The patient was awake, alert, attentive, oriented to person, place, and time  Recent and remote memory intact to conversation with no evidence of language dysfunction  Satisfactory fund of knowledge  Angry at time  Cranial Nerves:   I: smell Not tested   II: visual fields Full to confrontation  Pupils equal, round, reactive to light with normal accomodation  Fundus: benign fundus  III,IV,VI: extraocular muscles EOMI, no nystagmus   V: masseter and pterygoid strength full  Sensation in the V1 through V3 distributions intact to pinprick and light touch bilaterally  VII: Face is symmetric with no weakness noted  VIII: Audition intact to finger rub bilaterally  IX/X: Uvula midline  Soft palate elevation symmetric  XI: Trapezius and SCM strength 5/5 B/L  XII: Tongue midline with no atrophy or fasciculations with appropriate movement  Motor Examination:   No pronator drift  Bulk: Normal  No atrophy Tone: Normal  Fasciculations: None        Deltoid Biceps Triceps WE   WF   FF IO     Right        5         5          5         5      5      5   5        Left           5        5          5          5      5     5   5                       IP        Quad   Ham     TA       Gastroc   Right      5            5          5         5                5  Left         5            5         5         5                5       Reflexes:                   Biceps Brachioradialis Triceps Patella Achilles Plantars   Right          2+            2+                  2+        2+       2+         Down   Left            2+             2+                 2+         2+       2+ Down     Clonus: None    Pathological Reflexes:  Hoffmans: negative  Babinsky: negative  Jaw Jerk: negative    Coordination: Patient able to perform normal finger-to-nose and heel to shin appropriately  Normal rapid alternating movements  Sensory: Normal sensation to light touch, pin prick and vibratory sensation throughout  Gait:normal stance and posture, normal stride length and arm swing, normal turn around  Patient able to perform tandem gait without difficulty  Able toe walk and heel walk without difficulty  Romberg negative      Labs:  Recent Results (from the past 24 hour(s))   ECG 12 lead    Collection Time: 09/06/19  5:36 PM   Result Value Ref Range    Ventricular Rate 81 BPM    Atrial Rate 81 BPM    MD Interval 136 ms    QRSD Interval 78 ms    QT Interval 362 ms    QTC Interval 420 ms    P Axis 82 degrees    QRS Axis 77 degrees    T Wave Axis 41 degrees   Comprehensive metabolic panel    Collection Time: 09/06/19  6:18 PM   Result Value Ref Range    Sodium 139 136 - 145 mmol/L    Potassium 4 9 3 5 - 5 3 mmol/L    Chloride 103 100 - 108 mmol/L    CO2 32 21 - 32 mmol/L    ANION GAP 4 4 - 13 mmol/L    BUN 13 5 - 25 mg/dL    Creatinine 0 83 0 60 - 1 30 mg/dL    Glucose 93 65 - 140 mg/dL    Calcium 9 2 8 3 - 10 1 mg/dL    AST 39 5 - 45 U/L    ALT 25 12 - 78 U/L    Alkaline Phosphatase 75 46 - 116 U/L    Total Protein 7 5 6 4 - 8 2 g/dL    Albumin 3 5 3 5 - 5 0 g/dL    Total Bilirubin 0 60 0 20 - 1 00 mg/dL    eGFR 96 ml/min/1 73sq m   Valproic acid level, total    Collection Time: 09/06/19  6:18 PM   Result Value Ref Range    Valproic Acid, Total 96 50 - 100 ug/mL   Protime-INR    Collection Time: 09/06/19  6:18 PM   Result Value Ref Range    Protime 13 1 11 6 - 14 5 seconds    INR 1 02 0 84 - 1 19   APTT    Collection Time: 09/06/19  6:18 PM   Result Value Ref Range    PTT 25 23 - 37 seconds   Troponin I    Collection Time: 09/06/19  6:18 PM   Result Value Ref Range    Troponin I 0 04 <=0 04 ng/mL CBC and differential    Collection Time: 09/06/19  6:54 PM   Result Value Ref Range    WBC 8 68 4 31 - 10 16 Thousand/uL    RBC 3 84 (L) 3 88 - 5 62 Million/uL    Hemoglobin 12 9 12 0 - 17 0 g/dL    Hematocrit 38 8 36 5 - 49 3 %     (H) 82 - 98 fL    MCH 33 6 26 8 - 34 3 pg    MCHC 33 2 31 4 - 37 4 g/dL    RDW 12 6 11 6 - 15 1 %    MPV 10 6 8 9 - 12 7 fL    Platelets 703 858 - 929 Thousands/uL    nRBC 0 /100 WBCs    Neutrophils Relative 82 (H) 43 - 75 %    Immat GRANS % 0 0 - 2 %    Lymphocytes Relative 9 (L) 14 - 44 %    Monocytes Relative 9 4 - 12 %    Eosinophils Relative 0 0 - 6 %    Basophils Relative 0 0 - 1 %    Neutrophils Absolute 7 06 1 85 - 7 62 Thousands/µL    Immature Grans Absolute 0 02 0 00 - 0 20 Thousand/uL    Lymphocytes Absolute 0 78 0 60 - 4 47 Thousands/µL    Monocytes Absolute 0 79 0 17 - 1 22 Thousand/µL    Eosinophils Absolute 0 01 0 00 - 0 61 Thousand/µL    Basophils Absolute 0 02 0 00 - 0 10 Thousands/µL   POCT urinalysis dipstick    Collection Time: 09/06/19  7:42 PM   Result Value Ref Range    Color, UA concentrated     Clarity, UA clear     Glucose, UA (Ref: Negative) neg     Bilirubin, UA (Ref: Negative) neg     Ketones, UA (Ref: Negative) neg     Spec Grav, UA (Ref:1 003-1 030) 1 020     Blood, UA (Ref: Negative) neg     pH, UA (Ref: 4 5-8 0) 6 0     Protein, UA (Ref: Negative) neg     Urobilinogen, UA (Ref: 0 2- 1 0) 0 2      Leukocytes, UA (Ref: Negative) neg     Nitrite, UA (Ref: Negative) neg    Troponin I    Collection Time: 09/06/19 10:48 PM   Result Value Ref Range    Troponin I 0 04 <=0 04 ng/mL   Troponin I    Collection Time: 09/07/19  4:43 AM   Result Value Ref Range    Troponin I 0 06 (H) <=0 04 ng/mL   Basic metabolic panel    Collection Time: 09/07/19  4:43 AM   Result Value Ref Range    Sodium 139 136 - 145 mmol/L    Potassium 3 6 3 5 - 5 3 mmol/L    Chloride 104 100 - 108 mmol/L    CO2 30 21 - 32 mmol/L    ANION GAP 5 4 - 13 mmol/L    BUN 11 5 - 25 mg/dL Creatinine 0 56 (L) 0 60 - 1 30 mg/dL    Glucose 105 65 - 140 mg/dL    Glucose, Fasting 105 (H) 65 - 99 mg/dL    Calcium 8 1 (L) 8 3 - 10 1 mg/dL    eGFR 112 ml/min/1 73sq m   Magnesium    Collection Time: 09/07/19  4:43 AM   Result Value Ref Range    Magnesium 1 9 1 6 - 2 6 mg/dL   Phosphorus    Collection Time: 09/07/19  4:43 AM   Result Value Ref Range    Phosphorus 3 5 2 3 - 4 1 mg/dL   CBC (With Platelets)    Collection Time: 09/07/19  4:43 AM   Result Value Ref Range    WBC 6 09 4 31 - 10 16 Thousand/uL    RBC 3 83 (L) 3 88 - 5 62 Million/uL    Hemoglobin 12 9 12 0 - 17 0 g/dL    Hematocrit 38 1 36 5 - 49 3 %     (H) 82 - 98 fL    MCH 33 7 26 8 - 34 3 pg    MCHC 33 9 31 4 - 37 4 g/dL    RDW 12 5 11 6 - 15 1 %    Platelets 205 (L) 651 - 390 Thousands/uL    MPV 11 8 8 9 - 12 7 fL            panel  Results from last 7 days   Lab Units 09/07/19  0443   POTASSIUM mmol/L 3 6   CHLORIDE mmol/L 104   BUN mg/dL 11   CREATININE mg/dL 0 56*    Results from last 7 days   Lab Units 09/07/19  0443   HEMATOCRIT % 38 1   HEMOGLOBIN g/dL 12 9   MCH pg 33 7   MCHC g/dL 33 9   MCV fL 100*   MPV fL 11 8   PLATELETS Thousands/uL 106*   RDW % 12 5   WBC Thousand/uL 6 09    Results from last 7 days   Lab Units 09/06/19  1818   INR  1 02   PTT seconds 25    Results from last 7 days   Lab Units 09/07/19  0443   SODIUM mmol/L 139   CO2 mmol/L 30   BUN mg/dL 11   CREATININE mg/dL 0 56*    Lab Results   Component Value Date    ALT 25 09/06/2019    AST 39 09/06/2019    ALKPHOS 75 09/06/2019    TBILI 0 60 09/06/2019          Invalid input(s): TRIGLYCERIDE No results found for: HGBA1C TSH i: No results found for: TSH Imaging: CT head         ASSESSMENT/PLAN  59 yo male with 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 with syncopal spell in the setting of recent AED discontinuation, at least moderately suspicious for breakthrough seizure       Given he came off one of his AED's and had this spell, not unreasonable to treat as seizure although cardiac and neurocardiogenic cause as possible  Need [place] [employer] an no driving for 6 months until cleared  Personally counseled him and also risk of major injury and death to him, other cars,  and pedestrians if he synopsizes behind the wheel  Spoke with primary team about this  Restarted home previous phenytoin dose and would like him to f/u with his outpatient neurologist in next week or two

## 2019-09-07 NOTE — ASSESSMENT & PLAN NOTE
· Secondary to a traumatic brain injury  · Patient reports that this is not a typical presentation of his previous seizures and he does not feel so he had a seizure at this time  · Encourage outpatient follow-up with neurology   · Seizure precautions are in place  · Continue home dose of [id number] mg q  AM and 1500 mg q  HS  · Valproic acid level in therapeutic range

## 2019-09-07 NOTE — PLAN OF CARE
Problem: Potential for Falls  Goal: Patient will remain free of falls  Description  INTERVENTIONS:  - Assess patient frequently for physical needs  -  Identify cognitive and physical deficits and behaviors that affect risk of falls    -  [place] fall precautions as indicated by assessment   - Educate patient/family on patient safety including physical limitations  - Instruct patient to call for assistance with activity based on assessment  - Modify environment to reduce risk of injury  - Consider OT/PT consult to assist with strengthening/mobility  Outcome: Progressing     Problem: SKIN/TISSUE INTEGRITY - ADULT  Goal: Skin integrity remains intact  Description  INTERVENTIONS  - Identify patients at risk for skin breakdown  - Assess and monitor skin integrity  - Assess and monitor nutrition and hydration status  - Monitor labs (i e  albumin)  - Assess for incontinence   - Turn and reposition patient  - Assist with mobility/ambulation  - Relieve pressure over bony prominences  - Avoid friction and shearing  - Provide appropriate hygiene as needed including keeping skin clean and dry  - Evaluate need for skin moisturizer/barrier cream  - Collaborate with interdisciplinary team (i e  Nutrition, Rehabilitation, etc )   - Patient/family teaching  Outcome: Progressing     Problem: SAFETY ADULT  Goal: Maintain or return to baseline ADL function  Description  INTERVENTIONS:  -  Assess patient's ability to carry out ADLs; assess patient's baseline for ADL function and identify physical deficits which impact ability to perform ADLs (bathing, care of mouth/teeth, toileting, grooming, dressing, etc )  - Assess/evaluate cause of self-care deficits   - Assess range of motion  - Assess patient's mobility; develop plan if impaired  - Assess patient's need for assistive devices and provide as appropriate  - Encourage maximum independence but intervene and supervise when necessary  - Involve family in performance of ADLs  - Assess for home care needs following discharge   - Consider OT consult to assist with ADL evaluation and planning for discharge  - Provide patient education as appropriate  Outcome: Progressing  Goal: Maintain or return mobility status to optimal level  Description  INTERVENTIONS:  - Assess patient's baseline mobility status (ambulation, transfers, stairs, etc )    - Identify cognitive and physical deficits and behaviors that affect mobility  - Identify mobility aids required to assist with transfers and/or ambulation (gait belt, sit-to-stand, lift, walker, cane, etc )  - [place] fall precautions as indicated by assessment  - Record patient progress and toleration of activity level on Mobility SBAR; progress patient to next Phase/Stage  - Instruct patient to call for assistance with activity based on assessment  - Consider rehabilitation consult to assist with strengthening/weightbearing, etc   Outcome: Progressing

## 2019-09-07 NOTE — ASSESSMENT & PLAN NOTE
· S/p suture to laceration of scalp by ED provider  · Suture removal in 10 days by PCP  · Wound care is needed

## 2019-09-07 NOTE — ASSESSMENT & PLAN NOTE
Tylenol 650 mg p o  Q 6 hours as needed for mild pain  Oxycodone 5 mg p o  Q 4 hours for moderate pain

## 2019-09-07 NOTE — DISCHARGE SUMMARY
Discharge Summary - [address] [facility]    Patient Information: Erica Valdez 60 y o  male MRN: [id number]  Unit/Bed#: 2 [facility] 208-02 Encounter: [id number]    Discharging Physician / Practitioner: Oksana Reveles DO  PCP: Lyndsay Jacob DO  Admission Date: 9/6/2019  Discharge Date: 09/07/19    Reason for Admission: Syncope    Discharge Diagnoses:     Principal Problem:    Syncope  Active Problems:    H/O traumatic brain injury    Seizure disorder ([address] )    Laceration of scalp    Anxiety    Chronic back pain  Resolved Problems:    * No resolved hospital problems  *      Consultations During Hospital Stay:  · Neurology    Procedures Performed:     · CT head    Significant Findings:     ·     Incidental Findings:   ·     Test Results Pending at Discharge (will require follow up):   ·      Outpatient Tests Requested:  ·     Complications:  None    Hospital Course:     HPI:    Erica Valdez is a 60 y o  male past medical history TBI, seizures, chronic back pain and anxiety who presented to the emergency department for evaluation of syncopal episode  He reports that he awoke on the floor of the bathroom bleeding from his scalp, prior to this he was on the toilet having a bowel movement  He does not remember the events prior to him losing consciousness and striking his head  Denies previous syncopal episodes  Does have history of seizures and reports last seizure approximately 1 year ago, currently maintained on Depakote  Recent admission for phenytoin toxicity in July 2019 and phenytoin was not resumed at discharge  During his evaluation emergency department CT head showed no acute intracranial abnormality and EKG revealed no ST or T-wave changes  Laceration to scalp was sutured by ED provider  He denies chest pain/tenderness, shortness of breath, nausea, vomiting, changes in bladder or bowel  During my assessment and trauma evaluation he is AOx3 and BP is acceptable    We will admit to medical-surgical unit with telemetry monitoring in the setting of syncopal episode  Hospital course: # Syncope  - ddx including vaso vagal vs orthostatic vs neuro etiology  - S/p orthostatic vital monitoring; asymptomatic, has been ambulating  - s/p ct head no acute pathology  - serial trop only minimally elevated at 0 06 and pt is cp free this non mi related  - Hx of sz d/o and recently was taken off dilantin due to toxicity s/p increase in dose  Thus consulted neurology  Suspicion for breakthrough sz especially given recent change in AED thus recommends to resume back on original dose of dilantin prior to increase at 100 in am and 150 qhs  Pt to continue on depakote  Also recommended to d/c trazadone as can decrease sz threshold  - Would have liked to obtain echocardiogram but unfortunately cannot be done until Monday and patient adamantly refusing to stay till then  Thus will have it to be done as outpatient  Will also refer to cardiology as outpatient for holter monitoring  - Also recommended per neurology for patient not to drive and [place] has been [place]  # Chronic pain with chronic opioid dependence  - cont chronic narcotic regimen    # Hx of anxiety - given discontinuation of trazadone, will place on ativan at bedtime  He states he has used this in the past w/ no adverse side effects    # Scalp laceration - s/p suturing from the ED, removal as outpatient    Disp: D/c to home  Plan of care discussed with patient    Condition at Discharge: stable     Discharge Day Visit / Exam:     Subjective:  Was very anxious to go home but ultimately was agreeable to stay until eval per neuro  Ambulating; no recurring syncope  Denies any prodromal symptoms    No alarms on tele  Vitals: Blood Pressure: 94/63 (09/07/19 1104)  Pulse: 75 (09/07/19 0818)  Temperature: 98 3 °F (36 8 °C) (09/07/19 0700)  Temp Source: Oral (09/07/19 0700)  Respirations: 18 (09/07/19 0700)  Height: 6' (182 9 cm) (09/06/19 2209)  Weight - Scale: 64 4 kg (142 lb) (09/06/19 2209)  SpO2: 95 % (09/07/19 0700)  Exam:   Physical Exam   Constitutional: He is oriented to person, place, and time  He appears well-developed and well-nourished  Neck: Normal range of motion  Cardiovascular: Normal rate, regular rhythm, normal heart sounds and intact distal pulses  Exam reveals no gallop and no friction rub  No murmur heard  Pulmonary/Chest: Effort normal and breath sounds normal  No stridor  No respiratory distress  He has no wheezes  He has no rales  Abdominal: Soft  Bowel sounds are normal  He exhibits no distension  There is no tenderness  There is no rebound and no guarding  Musculoskeletal: Normal range of motion  He exhibits no edema, tenderness or deformity  Neurological: He is alert and oriented to person, place, and time  He displays normal reflexes  No cranial nerve deficit or sensory deficit  He exhibits normal muscle tone  Coordination normal    Baseline hx of TBI   Skin: Skin is warm and dry  Psychiatric:   Baseline hx of TBI       Discharge instructions/Information to patient and family:   See after visit summary for information provided to patient and family  Provisions for Follow-Up Care:  See after visit summary for information related to follow-up care and any pertinent home health orders  Disposition:     Home    For Discharges to [facility]:   · Not Applicable to this Patient - Not Applicable to this Patient    Planned Readmission: No     Discharge Statement:  I spent > 30 minutes discharging the patient  This time was spent on the day of discharge  I had direct contact with the patient on the day of discharge  Greater than 50% of the total time was spent examining patient, answering all patient questions, arranging and discussing plan of care with patient as well as directly providing post-discharge instructions  Additional time then spent on discharge activities      Discharge Medications:  See after visit summary for reconciled discharge medications provided to patient and family  ** Please Note: Dragon 360 Dictation voice to text software may have been used in the creation of this document   **

## 2019-09-08 LAB
ATRIAL RATE: 66 BPM
P AXIS: 65 DEGREES
PR INTERVAL: 150 MS
QRS AXIS: 39 DEGREES
QRSD INTERVAL: 88 MS
QT INTERVAL: 396 MS
QTC INTERVAL: 415 MS
T WAVE AXIS: 1 DEGREES
VENTRICULAR RATE: 66 BPM

## 2019-09-08 PROCEDURE — 93010 ELECTROCARDIOGRAM REPORT: CPT | Performed by: INTERNAL MEDICINE

## 2020-07-09 ENCOUNTER — HOSPITAL ENCOUNTER (EMERGENCY)
Facility: HOSPITAL | Age: 60
Discharge: HOME | End: 2020-07-09
Attending: EMERGENCY MEDICINE | Admitting: EMERGENCY MEDICINE
Payer: COMMERCIAL

## 2020-07-09 ENCOUNTER — APPOINTMENT (EMERGENCY)
Dept: RADIOLOGY | Facility: HOSPITAL | Age: 60
End: 2020-07-09
Payer: COMMERCIAL

## 2020-07-09 VITALS
BODY MASS INDEX: 19.12 KG/M2 | TEMPERATURE: 98.1 F | HEART RATE: 81 BPM | SYSTOLIC BLOOD PRESSURE: 136 MMHG | RESPIRATION RATE: 18 BRPM | DIASTOLIC BLOOD PRESSURE: 81 MMHG | WEIGHT: 141 LBS | OXYGEN SATURATION: 98 %

## 2020-07-09 DIAGNOSIS — S90.31XA CONTUSION OF RIGHT FOOT: Primary | ICD-10-CM

## 2020-07-09 PROCEDURE — 73630 X-RAY EXAM OF FOOT: CPT

## 2020-07-09 PROCEDURE — 99283 EMERGENCY DEPT VISIT LOW MDM: CPT

## 2020-07-09 PROCEDURE — 99284 EMERGENCY DEPT VISIT MOD MDM: CPT | Performed by: EMERGENCY MEDICINE

## 2020-07-09 RX ORDER — KETOROLAC TROMETHAMINE 30 MG/ML
15 INJECTION, SOLUTION INTRAMUSCULAR; INTRAVENOUS ONCE
Status: DISCONTINUED | OUTPATIENT
Start: 2020-07-09 | End: 2020-07-09

## 2020-07-11 NOTE — ED PROVIDER NOTES
History  Chief Complaint   Patient presents with    Foot Injury     To ED with c/o right foot pain  63 yom right foot pain, banged it on leg of table  Xray to r/o fracture      Foot Injury - Major   Location:  Foot  Injury: yes    Foot location:  R foot  Pain details:     Quality:  Aching    Radiates to:  Does not radiate    Severity:  Mild    Onset quality:  Sudden    Timing:  Intermittent    Progression:  Waxing and waning  Chronicity:  New  Foreign body present:  No foreign bodies  Tetanus status:  Up to date  Prior injury to area:  Yes  Relieved by:  Nothing  Worsened by:  Nothing  Ineffective treatments:  None tried  Associated symptoms: no back pain, no decreased ROM, no fatigue, no fever, no muscle weakness, no neck pain, no numbness, no stiffness and no swelling    Risk factors: no obesity        Prior to Admission Medications   Prescriptions Last Dose Informant Patient Reported? Taking? Cholecalciferol (VITAMIN D3) 2000 units capsule   Yes No   Sig: Take 2,000 Units by mouth daily   LORazepam (ATIVAN) 0 5 mg tablet   No No   Sig: Take 1 tablet (0 5 mg total) by mouth daily at bedtime for 10 days   diphenhydrAMINE (BENADRYL) 25 mg tablet   Yes No   Sig: Take 25 mg by mouth every 6 (six) hours as needed for itching   divalproex sodium (DEPAKOTE ER) 500 mg 24 hr tablet   No No   Sig: Patient takes 2 tablets in the morning, 3 tablets in the evening     divalproex sodium (DEPAKOTE) 250 mg EC tablet   Yes No   Sig: Take 250 mg by mouth daily with dinner   oxyCODONE (ROXICODONE) 15 mg immediate release tablet   Yes No   Sig: Take 15 mg by mouth every 4 (four) hours as needed for severe pain    phenytoin (DILANTIN) 50 mg tablet   No No   Sig: Take 100 mg (2 tabs) oral in am and 150 mg (3 tabs) oral at bedtime   rosuvastatin (CRESTOR) 20 MG tablet   Yes No   Sig: Take 20 mg by mouth daily      Facility-Administered Medications: None       Past Medical History:   Diagnosis Date    Anxiety     Back pain     History of herniated intervertebral disc     Seizures (HCC)     TBI (traumatic brain injury) ([facility] [address] )        Past Surgical History:   Procedure Laterality Date    BACK SURGERY      BRAIN SURGERY      LEG SURGERY         History reviewed  No pertinent family history  I have reviewed and agree with the history as documented  E-Cigarette/Vaping    E-Cigarette Use Never User      E-Cigarette/Vaping Substances    Nicotine No     Flavoring No      Social History     Tobacco Use    Smoking status: Current Every Day Smoker     Packs/day: 1 00     Types: Cigarettes    Smokeless tobacco: Current User    Tobacco comment: 14 years   Substance Use Topics    Alcohol use: Never     Frequency: Never     Binge frequency: Never    Drug use: No       Review of Systems   Constitutional: Negative for chills, fatigue and fever  Eyes: Negative for photophobia and visual disturbance  Respiratory: Negative for cough and shortness of breath  Cardiovascular: Negative for chest pain, palpitations and leg swelling  Gastrointestinal: Negative for diarrhea, nausea and vomiting  Endocrine: Negative for polydipsia and polyuria  Genitourinary: Negative for decreased urine volume, difficulty urinating, dysuria and frequency  Musculoskeletal: Negative for back pain, neck pain, neck stiffness and stiffness  Skin: Negative for color change and rash  Allergic/Immunologic: Negative for environmental allergies and immunocompromised state  Neurological: Negative for dizziness and headaches  Hematological: Negative for adenopathy  Does not bruise/bleed easily  Psychiatric/Behavioral: Negative for dysphoric mood  The patient is not nervous/anxious  Physical Exam  Physical Exam   Constitutional: He is oriented to person, place, and time  He appears well-developed and well-nourished  No distress  HENT:   Head: Normocephalic and atraumatic  Eyes: Pupils are equal, round, and reactive to light   EOM are normal  Cardiovascular: Normal rate and regular rhythm  Pulmonary/Chest: Effort normal and breath sounds normal  No respiratory distress  Abdominal: Soft  Bowel sounds are normal  He exhibits no distension  Musculoskeletal: He exhibits tenderness (right foot)  He exhibits no deformity  Neurological: He is alert and oriented to person, place, and time  Skin: Skin is warm and dry  Vital Signs  ED Triage Vitals   Temperature Pulse Respirations Blood Pressure SpO2   07/09/20 1331 07/09/20 1331 07/09/20 1331 07/09/20 1331 07/09/20 1331   98 1 °F (36 7 °C) 81 18 136/81 98 %      Temp Source Heart Rate Source Patient Position - Orthostatic VS BP Location FiO2 (%)   07/09/20 1331 -- -- 07/09/20 1331 --   Temporal   Left arm       Pain Score       07/09/20 1330       7           Vitals:    07/09/20 1331   BP: 136/81   Pulse: 81         Visual Acuity  Visual Acuity      Most Recent Value   L Pupil Size (mm)  3   R Pupil Size (mm)  3          ED Medications  Medications - No data to display    Diagnostic Studies  Results Reviewed     None                 XR foot 3+ views RIGHT   ED Interpretation by Phoebe Anderson DO (07/09 1417)   No acute fracture, old hardware      Final Result by Nelia Wilson MD (07/09 1530)   Status post ORIF calcaneal fracture      No acute osseous abnormality  Findings are consistent with emergency provider's preliminary reading                     Workstation performed: [id number]                    Procedures  Procedures         ED Course       US AUDIT      Most Recent Value   Initial Alcohol Screen: US AUDIT-C    1  How often do you have a drink containing alcohol?  0 Filed at: 07/09/2020 1332   2  How many drinks containing alcohol do you have on a typical day you are drinking? 0 Filed at: 07/09/2020 1332   3a  Male UNDER 65: How often do you have five or more drinks on one occasion? 0 Filed at: 07/09/2020 1332   3b  FEMALE Any Age, or MALE 65+:  How often do you have 4 or more drinks on one occassion? 0 Filed at: 07/09/2020 1332   Audit-C Score  0 Filed at: 07/09/2020 1332                  ANNE-MARIE/DAST-10      Most Recent Value   How many times in the past year have you    Used an illegal drug or used a prescription medication for non-medical reasons? Never Filed at: 07/09/2020 1332                                [facility]  Number of Diagnoses or Management Options  Contusion of right foot: new and requires workup     Amount and/or Complexity of Data Reviewed  Tests in the radiology section of CPT®: reviewed and ordered  Independent visualization of images, tracings, or specimens: yes          Disposition  Final diagnoses:   Contusion of right foot     Time reflects when diagnosis was documented in both MDM as applicable and the Disposition within this note     Time User Action Codes Description Comment    7/9/2020  2:18 PM Belinda Persaud Contusion of right foot       ED Disposition     ED Disposition Condition Date/Time Comment    Discharge Stable u Jul 9, 2020  2:18 PM Yung Tam discharge to home/self care              Follow-up Information     Follow up With Specialties Details Why [address], DO Family Medicine Schedule an appointment as soon as possible for a visit  As needed [address] [facility]  [address] [address]  [address]            Discharge Medication List as of 7/9/2020  2:18 PM      CONTINUE these medications which have NOT CHANGED    Details   Cholecalciferol (VITAMIN D3) 2000 units capsule Take 2,000 Units by mouth daily, Historical Med      diphenhydrAMINE (BENADRYL) 25 mg tablet Take 25 mg by mouth every 6 (six) hours as needed for itching, Historical Med      divalproex sodium (DEPAKOTE ER) 500 mg 24 hr tablet Patient takes 2 tablets in the morning, 3 tablets in the evening , No Print      divalproex sodium (DEPAKOTE) 250 mg EC tablet Take 250 mg by mouth daily with dinner, Historical Med      LORazepam (ATIVAN) 0 5 mg tablet Take 1 tablet (0 5 mg total) by mouth daily at bedtime for 10 days, Starting Sat 9/7/2019, Until Tue 9/17/2019, Print      oxyCODONE (ROXICODONE) 15 mg immediate release tablet Take 15 mg by mouth every 4 (four) hours as needed for severe pain , Historical Med      phenytoin (DILANTIN) 50 mg tablet Take 100 mg (2 tabs) oral in am and 150 mg (3 tabs) oral at bedtime, Print      rosuvastatin (CRESTOR) 20 MG tablet Take 20 mg by mouth daily, Starting Tue 8/6/2019, Historical Med           No discharge procedures on file      PDMP Review     None          ED Provider  Electronically Signed by           Phoebe Anedrson DO  07/11/20 0041

## 2020-07-24 ENCOUNTER — APPOINTMENT (EMERGENCY)
Dept: CT IMAGING | Facility: HOSPITAL | Age: 60
End: 2020-07-24
Payer: COMMERCIAL

## 2020-07-24 ENCOUNTER — APPOINTMENT (EMERGENCY)
Dept: RADIOLOGY | Facility: HOSPITAL | Age: 60
End: 2020-07-24
Payer: COMMERCIAL

## 2020-07-24 ENCOUNTER — HOSPITAL ENCOUNTER (EMERGENCY)
Facility: HOSPITAL | Age: 60
Discharge: HOME | End: 2020-07-24
Attending: EMERGENCY MEDICINE | Admitting: EMERGENCY MEDICINE
Payer: COMMERCIAL

## 2020-07-24 VITALS
WEIGHT: 155 LBS | HEIGHT: 72 IN | HEART RATE: 93 BPM | TEMPERATURE: 99.6 F | OXYGEN SATURATION: 98 % | RESPIRATION RATE: 18 BRPM | BODY MASS INDEX: 20.99 KG/M2 | SYSTOLIC BLOOD PRESSURE: 133 MMHG | DIASTOLIC BLOOD PRESSURE: 74 MMHG

## 2020-07-24 DIAGNOSIS — R78.89 SUBTHERAPEUTIC SERUM DILANTIN LEVEL: ICD-10-CM

## 2020-07-24 DIAGNOSIS — G40.909 SEIZURE DISORDER (HCC): Primary | ICD-10-CM

## 2020-07-24 LAB
ALBUMIN SERPL BCP-MCNC: 3.3 G/DL (ref 3.5–5)
ALP SERPL-CCNC: 62 U/L (ref 46–116)
ALT SERPL W P-5'-P-CCNC: 23 U/L (ref 12–78)
ANION GAP SERPL CALCULATED.3IONS-SCNC: 8 MMOL/L (ref 4–13)
AST SERPL W P-5'-P-CCNC: 15 U/L (ref 5–45)
BASOPHILS # BLD AUTO: 0.03 THOUSANDS/ΜL (ref 0–0.1)
BASOPHILS NFR BLD AUTO: 1 % (ref 0–1)
BILIRUB SERPL-MCNC: 0.3 MG/DL (ref 0.2–1)
BUN SERPL-MCNC: 16 MG/DL (ref 5–25)
CALCIUM SERPL-MCNC: 8.4 MG/DL (ref 8.3–10.1)
CHLORIDE SERPL-SCNC: 109 MMOL/L (ref 100–108)
CO2 SERPL-SCNC: 28 MMOL/L (ref 21–32)
CREAT SERPL-MCNC: 0.74 MG/DL (ref 0.6–1.3)
EOSINOPHIL # BLD AUTO: 0.03 THOUSAND/ΜL (ref 0–0.61)
EOSINOPHIL NFR BLD AUTO: 1 % (ref 0–6)
ERYTHROCYTE [DISTWIDTH] IN BLOOD BY AUTOMATED COUNT: 12.2 % (ref 11.6–15.1)
GFR SERPL CREATININE-BSD FRML MDRD: 100 ML/MIN/1.73SQ M
GLUCOSE SERPL-MCNC: 98 MG/DL (ref 65–140)
HCT VFR BLD AUTO: 37.3 % (ref 36.5–49.3)
HGB BLD-MCNC: 12.7 G/DL (ref 12–17)
IMM GRANULOCYTES # BLD AUTO: 0.01 THOUSAND/UL (ref 0–0.2)
IMM GRANULOCYTES NFR BLD AUTO: 0 % (ref 0–2)
LYMPHOCYTES # BLD AUTO: 1.26 THOUSANDS/ΜL (ref 0.6–4.47)
LYMPHOCYTES NFR BLD AUTO: 28 % (ref 14–44)
MCH RBC QN AUTO: 33.7 PG (ref 26.8–34.3)
MCHC RBC AUTO-ENTMCNC: 34 G/DL (ref 31.4–37.4)
MCV RBC AUTO: 99 FL (ref 82–98)
MONOCYTES # BLD AUTO: 0.49 THOUSAND/ΜL (ref 0.17–1.22)
MONOCYTES NFR BLD AUTO: 11 % (ref 4–12)
NEUTROPHILS # BLD AUTO: 2.72 THOUSANDS/ΜL (ref 1.85–7.62)
NEUTS SEG NFR BLD AUTO: 59 % (ref 43–75)
NRBC BLD AUTO-RTO: 0 /100 WBCS
PHENYTOIN SERPL-MCNC: 9.7 UG/ML (ref 10–20)
PLATELET # BLD AUTO: 242 THOUSANDS/UL (ref 149–390)
PMV BLD AUTO: 9.5 FL (ref 8.9–12.7)
POTASSIUM SERPL-SCNC: 3.9 MMOL/L (ref 3.5–5.3)
PROT SERPL-MCNC: 6.5 G/DL (ref 6.4–8.2)
RBC # BLD AUTO: 3.77 MILLION/UL (ref 3.88–5.62)
SODIUM SERPL-SCNC: 145 MMOL/L (ref 136–145)
VALPROATE SERPL-MCNC: 52 UG/ML (ref 50–100)
WBC # BLD AUTO: 4.54 THOUSAND/UL (ref 4.31–10.16)

## 2020-07-24 PROCEDURE — 80053 COMPREHEN METABOLIC PANEL: CPT | Performed by: PHYSICIAN ASSISTANT

## 2020-07-24 PROCEDURE — 71045 X-RAY EXAM CHEST 1 VIEW: CPT

## 2020-07-24 PROCEDURE — 99284 EMERGENCY DEPT VISIT MOD MDM: CPT

## 2020-07-24 PROCEDURE — 80164 ASSAY DIPROPYLACETIC ACD TOT: CPT | Performed by: PHYSICIAN ASSISTANT

## 2020-07-24 PROCEDURE — 93005 ELECTROCARDIOGRAM TRACING: CPT

## 2020-07-24 PROCEDURE — 85025 COMPLETE CBC W/AUTO DIFF WBC: CPT | Performed by: PHYSICIAN ASSISTANT

## 2020-07-24 PROCEDURE — 70450 CT HEAD/BRAIN W/O DYE: CPT

## 2020-07-24 PROCEDURE — 80185 ASSAY OF PHENYTOIN TOTAL: CPT | Performed by: PHYSICIAN ASSISTANT

## 2020-07-24 PROCEDURE — 36415 COLL VENOUS BLD VENIPUNCTURE: CPT | Performed by: PHYSICIAN ASSISTANT

## 2020-07-24 PROCEDURE — 99285 EMERGENCY DEPT VISIT HI MDM: CPT | Performed by: PHYSICIAN ASSISTANT

## 2020-07-24 PROCEDURE — 96361 HYDRATE IV INFUSION ADD-ON: CPT

## 2020-07-24 PROCEDURE — 96360 HYDRATION IV INFUSION INIT: CPT

## 2020-07-24 RX ORDER — PHENYTOIN SODIUM 100 MG/1
300 CAPSULE, EXTENDED RELEASE ORAL ONCE
Status: COMPLETED | OUTPATIENT
Start: 2020-07-24 | End: 2020-07-24

## 2020-07-24 RX ADMIN — PHENYTOIN SODIUM 300 MG: 100 CAPSULE ORAL at 17:59

## 2020-07-24 RX ADMIN — SODIUM CHLORIDE 1000 ML: 0.9 INJECTION, SOLUTION INTRAVENOUS at 16:22

## 2020-07-24 NOTE — ED PROVIDER NOTES
History  Chief Complaint   Patient presents with    Seizure - Prior Hx Of     patient states that he believes he had a seizure as he had difficulty walking and remembering phone number 2 days ago  states that he feels better now but still feels shakey      Patient is a 62 y/o M with h/o TBI, seizures that presents to the ED stating he thinks he had a seizure 3 days ago and still feels shaky  Patient states his seizures are well controlled with dilantin and depakote  He sees Dr Celena Tafoya from [facility] for his seizures  He states 3 days ago he felt off balance, dizzy and shaky  He states he did miss his morning dose of medications that day  Patient states he has been staying well hydrated and has been getting plenty of sleep  No medication changes recently  He denies recent head injury, headaches, chest pain, SOB  History provided by:  Patient  Seizure - Prior Hx Of   Seizure activity on arrival: no    Preceding symptoms: dizziness    Preceding symptoms: no headache    Episode characteristics: no abnormal movements, no confusion, no disorientation, no generalized shaking, no stiffening and responsive    Progression:  Improving  Context: medical non-compliance (missed 1 dose of medication) and previous head injury    Context: not change in medication, not sleeping less, not fever and not stress    Recent head injury:  No recent head injuries  PTA treatment:  None  History of seizures: yes        Prior to Admission Medications   Prescriptions Last Dose Informant Patient Reported? Taking?    Cholecalciferol (VITAMIN D3) 2000 units capsule   Yes No   Sig: Take 2,000 Units by mouth daily   LORazepam (ATIVAN) 0 5 mg tablet   No No   Sig: Take 1 tablet (0 5 mg total) by mouth daily at bedtime for 10 days   diphenhydrAMINE (BENADRYL) 25 mg tablet   Yes No   Sig: Take 25 mg by mouth every 6 (six) hours as needed for itching   divalproex sodium (DEPAKOTE ER) 500 mg 24 hr tablet   No No   Sig: Patient takes 2 tablets in the morning, 3 tablets in the evening  divalproex sodium (DEPAKOTE) 250 mg EC tablet   Yes No   Sig: Take 250 mg by mouth daily with dinner   oxyCODONE (ROXICODONE) 15 mg immediate release tablet   Yes No   Sig: Take 15 mg by mouth every 4 (four) hours as needed for severe pain    phenytoin (DILANTIN) 50 mg tablet   No No   Sig: Take 100 mg (2 tabs) oral in am and 150 mg (3 tabs) oral at bedtime   rosuvastatin (CRESTOR) 20 MG tablet   Yes No   Sig: Take 20 mg by mouth daily      Facility-Administered Medications: None       Past Medical History:   Diagnosis Date    Anxiety     Back pain     History of herniated intervertebral disc     Seizures (HCC)     TBI (traumatic brain injury) ([facility] [address] )        Past Surgical History:   Procedure Laterality Date    BACK SURGERY      BRAIN SURGERY      LEG SURGERY         History reviewed  No pertinent family history  I have reviewed and agree with the history as documented  E-Cigarette/Vaping    E-Cigarette Use Never User      E-Cigarette/Vaping Substances    Nicotine No     Flavoring No      Social History     Tobacco Use    Smoking status: Current Every Day Smoker     Packs/day: 0 50     Types: Cigarettes    Smokeless tobacco: Current User    Tobacco comment: 14 years   Substance Use Topics    Alcohol use: Never     Frequency: Never     Binge frequency: Never    Drug use: Yes     Types: Marijuana     Comment: medical marijuana card       Review of Systems   Constitutional: Negative for chills and fever  HENT: Negative  Respiratory: Negative for cough and shortness of breath  Gastrointestinal: Negative for abdominal pain, diarrhea, nausea and vomiting  Genitourinary: Negative for dysuria  Musculoskeletal: Negative for back pain  Skin: Negative for color change and rash  Neurological: Positive for dizziness and tremors  Negative for speech difficulty and headaches  Psychiatric/Behavioral: Negative for confusion and decreased concentration  All other systems reviewed and are negative  Physical Exam  Physical Exam   Constitutional: He is oriented to person, place, and time  He appears well-developed and well-nourished  He is cooperative  He does not appear ill  No distress  HENT:   Head: Normocephalic and atraumatic  Nose: Nose normal    Mouth/Throat: Oropharynx is clear and moist    Eyes: Conjunctivae are normal    Neck: Normal range of motion  Cardiovascular: Normal rate, regular rhythm and normal heart sounds  Pulmonary/Chest: Effort normal and breath sounds normal  He has no wheezes  He has no rhonchi  He has no rales  Abdominal: Soft  Normal appearance and bowel sounds are normal  There is no tenderness  Musculoskeletal: Normal range of motion  He exhibits no edema  Neurological: He is alert and oriented to person, place, and time  He has normal strength  No sensory deficit  Gait normal    Skin: Skin is warm and dry  No rash noted  He is not diaphoretic  No pallor  Psychiatric: He has a normal mood and affect  Nursing note and vitals reviewed        Vital Signs  ED Triage Vitals [07/24/20 1530]   Temperature Pulse Respirations Blood Pressure SpO2   99 6 °F (37 6 °C) 93 18 133/74 98 %      Temp src Heart Rate Source Patient Position - Orthostatic VS BP Location FiO2 (%)   -- Monitor -- -- --      Pain Score       No Pain           Vitals:    07/24/20 1530   BP: 133/74   Pulse: 93         Visual Acuity  Visual Acuity      Most Recent Value   L Pupil Size (mm)  3   R Pupil Size (mm)  3          ED Medications  Medications   sodium chloride 0 9 % bolus 1,000 mL (0 mL Intravenous Stopped 7/24/20 1805)   phenytoin (DILANTIN) ER capsule 300 mg (300 mg Oral Given 7/24/20 1759)       Diagnostic Studies  Results Reviewed     Procedure Component Value Units Date/Time    Comprehensive metabolic panel [[id number]]  (Abnormal) Collected:  07/24/20 1622    Lab Status:  Final result Specimen:  Blood from Arm, Right Updated:  07/24/20 4937 Sodium 145 mmol/L      Potassium 3 9 mmol/L      Chloride 109 mmol/L      CO2 28 mmol/L      ANION GAP 8 mmol/L      BUN 16 mg/dL      Creatinine 0 74 mg/dL      Glucose 98 mg/dL      Calcium 8 4 mg/dL      AST 15 U/L      ALT 23 U/L      Alkaline Phosphatase 62 U/L      Total Protein 6 5 g/dL      Albumin 3 3 g/dL      Total Bilirubin 0 30 mg/dL      eGFR 100 ml/min/1 73sq m     Narrative:       National Kidney Disease Foundation guidelines for Chronic Kidney Disease (CKD):     Stage 1 with normal or high GFR (GFR > 90 mL/min/1 73 square meters)    Stage 2 Mild CKD (GFR = 60-89 mL/min/1 73 square meters)    Stage 3A Moderate CKD (GFR = 45-59 mL/min/1 73 square meters)    Stage 3B Moderate CKD (GFR = 30-44 mL/min/1 73 square meters)    Stage 4 Severe CKD (GFR = 15-29 mL/min/1 73 square meters)    Stage 5 End Stage CKD (GFR <15 mL/min/1 73 square meters)  Note: GFR calculation is accurate only with a steady state creatinine    Valproic acid level, total [[id number]]  (Normal) Collected:  07/24/20 1622    Lab Status:  Final result Specimen:  Blood from Arm, Right Updated:  07/24/20 1653     Valproic Acid, Total 52 ug/mL     Phenytoin level, total [[id number]]  (Abnormal) Collected:  07/24/20 1622    Lab Status:  Final result Specimen:  Blood from Arm, Right Updated:  07/24/20 1644     Phenytoin Lvl 9 7 ug/mL     CBC and differential [[id number]]  (Abnormal) Collected:  07/24/20 1622    Lab Status:  Final result Specimen:  Blood from Arm, Right Updated:  07/24/20 1629     WBC 4 54 Thousand/uL      RBC 3 77 Million/uL      Hemoglobin 12 7 g/dL      Hematocrit 37 3 %      MCV 99 fL      MCH 33 7 pg      MCHC 34 0 g/dL      RDW 12 2 %      MPV 9 5 fL      Platelets 323 Thousands/uL      nRBC 0 /100 WBCs      Neutrophils Relative 59 %      Immat GRANS % 0 %      Lymphocytes Relative 28 %      Monocytes Relative 11 %      Eosinophils Relative 1 %      Basophils Relative 1 %      Neutrophils Absolute 2 72 Thousands/µL Immature Grans Absolute 0 01 Thousand/uL      Lymphocytes Absolute 1 26 Thousands/µL      Monocytes Absolute 0 49 Thousand/µL      Eosinophils Absolute 0 03 Thousand/µL      Basophils Absolute 0 03 Thousands/µL                  XR chest 1 view portable   Final Result by Thelma Carlos MD (07/24 1720)      No acute cardiopulmonary disease  Workstation performed: [id number]         CT head without contrast   Final Result by Thelma Carlos MD (07/24 1749)   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  No acute intracranial abnormality  Workstation performed: [id number]                    Procedures  ECG 12 Lead Documentation Only  Date/Time: 7/24/2020 4:40 PM  Performed by: Wyatt Bhandari PA-C  Authorized by: Wyatt Bhandari PA-C     Indications / Diagnosis:  Tremors, dizzy  ECG reviewed by me, the ED Provider: yes    Patient location:  ED  Previous ECG:     Previous ECG:  Compared to current    Similarity:  No change  Rate:     ECG rate:  76  Rhythm:     Rhythm: sinus rhythm    Conduction:     Conduction: normal    ST segments:     ST segments:  Normal  T waves:     T waves: normal               ED Course  ED Course as of Jul 24 1817 Fri Jul 24, 2020 1805 Upon discharge, patient requesting oxycodone  I advised f/u with PCP  US AUDIT      Most Recent Value   Initial Alcohol Screen: US AUDIT-C    1  How often do you have a drink containing alcohol?  0 Filed at: 07/24/2020 1531   2  How many drinks containing alcohol do you have on a typical day you are drinking? 0 Filed at: 07/24/2020 1531   3a  Male UNDER 65: How often do you have five or more drinks on one occasion? 0 Filed at: 07/24/2020 1531   3b  FEMALE Any Age, or MALE 65+: How often do you have 4 or more drinks on one occassion?   0 Filed at: 07/24/2020 1531   Audit-C Score  0 Filed at: 07/24/2020 1531                  ANNE-MARIE/DAST-10      Most Recent Value   How many times in the past year have you  Used an illegal drug or used a prescription medication for non-medical reasons? Never Filed at: 07/24/2020 1531                                MDM  Number of Diagnoses or Management Options  Seizure disorder [facility]): new and requires workup  Subtherapeutic serum dilantin level: new and requires workup  Diagnosis management comments: Patient with feeling of shakiness and fear that he is going to have a seizure, will check labs, give IV fluids and check med levels  Dilantin slightly low, d/w Dr Lino Monae, he suggests giving an extra 300mg of dilantin now  Patient instructed to continue his normal dose of meds and f/u with neurologist   Upon discharge patient requesting oxycodone, I advised f/u with his PM doctor  Amount and/or Complexity of Data Reviewed  Clinical lab tests: ordered and reviewed  Tests in the radiology section of CPT®: ordered and reviewed    Patient Progress  Patient progress: stable        Disposition  Final diagnoses:   Seizure disorder ([facility] [address] )   Subtherapeutic serum dilantin level     Time reflects when diagnosis was documented in both MDM as applicable and the Disposition within this note     Time User Action Codes Description Comment    7/24/2020  6:01 PM Jonas Vaughan [G40 [id number]] Seizure disorder ([facility] [address] )     7/24/2020  6:01 PM Jonas Vaughan [R78 [id number]] Subtherapeutic serum dilantin level       ED Disposition     ED Disposition Condition Date/Time Comment    Discharge Stable Fri Jul 24, 2020  6:01 PM Yung Tam discharge to home/self care              Follow-up Information     Follow up With Specialties Details Why Contact Info    Dakotah Roberts MD Neurology Call in 2 days For recheck [facility] [address] [facility]  [phone]            Discharge Medication List as of 7/24/2020  6:02 PM      CONTINUE these medications which have NOT CHANGED    Details   Cholecalciferol (VITAMIN D3) 2000 units capsule Take 2,000 Units by mouth daily, Historical Med      diphenhydrAMINE (BENADRYL) 25 mg tablet Take 25 mg by mouth every 6 (six) hours as needed for itching, Historical Med      divalproex sodium (DEPAKOTE ER) 500 mg 24 hr tablet Patient takes 2 tablets in the morning, 3 tablets in the evening , No Print      divalproex sodium (DEPAKOTE) 250 mg EC tablet Take 250 mg by mouth daily with dinner, Historical Med      LORazepam (ATIVAN) 0 5 mg tablet Take 1 tablet (0 5 mg total) by mouth daily at bedtime for 10 days, Starting Sat 9/7/2019, Until Tue 9/17/2019, Print      oxyCODONE (ROXICODONE) 15 mg immediate release tablet Take 15 mg by mouth every 4 (four) hours as needed for severe pain , Historical Med      phenytoin (DILANTIN) 50 mg tablet Take 100 mg (2 tabs) oral in am and 150 mg (3 tabs) oral at bedtime, Print      rosuvastatin (CRESTOR) 20 MG tablet Take 20 mg by mouth daily, Starting Tue 8/6/2019, Historical Med           No discharge procedures on file      PDMP Review     None          ED Provider  Electronically Signed by           Olu Castellon PA-C  07/24/20 7809

## 2020-07-24 NOTE — DISCHARGE INSTRUCTIONS
Continue your current medications as prescribed  Follow up with Dr Betsy Bradford on Monday for recheck  Return to ER if symptoms worsen

## 2020-07-25 LAB
ATRIAL RATE: 76 BPM
P AXIS: 74 DEGREES
PR INTERVAL: 148 MS
QRS AXIS: 77 DEGREES
QRSD INTERVAL: 82 MS
QT INTERVAL: 362 MS
QTC INTERVAL: 407 MS
T WAVE AXIS: 44 DEGREES
VENTRICULAR RATE: 76 BPM

## 2020-07-25 PROCEDURE — 93010 ELECTROCARDIOGRAM REPORT: CPT | Performed by: INTERNAL MEDICINE

## 2020-08-11 ENCOUNTER — HOSPITAL ENCOUNTER (EMERGENCY)
Facility: HOSPITAL | Age: 60
Discharge: HOME | DRG: 092 | End: 2020-08-11
Attending: EMERGENCY MEDICINE
Payer: COMMERCIAL

## 2020-08-11 ENCOUNTER — APPOINTMENT (EMERGENCY)
Dept: CT IMAGING | Facility: HOSPITAL | Age: 60
DRG: 092 | End: 2020-08-11
Payer: COMMERCIAL

## 2020-08-11 ENCOUNTER — APPOINTMENT (EMERGENCY)
Dept: RADIOLOGY | Facility: HOSPITAL | Age: 60
DRG: 092 | End: 2020-08-11
Payer: COMMERCIAL

## 2020-08-11 VITALS
RESPIRATION RATE: 20 BRPM | DIASTOLIC BLOOD PRESSURE: 80 MMHG | HEART RATE: 85 BPM | SYSTOLIC BLOOD PRESSURE: 140 MMHG | OXYGEN SATURATION: 97 % | TEMPERATURE: 98.4 F

## 2020-08-11 VITALS
SYSTOLIC BLOOD PRESSURE: 143 MMHG | BODY MASS INDEX: 21.02 KG/M2 | OXYGEN SATURATION: 95 % | DIASTOLIC BLOOD PRESSURE: 72 MMHG | TEMPERATURE: 98.2 F | RESPIRATION RATE: 21 BRPM | HEART RATE: 81 BPM | WEIGHT: 155 LBS

## 2020-08-11 DIAGNOSIS — R29.6 MULTIPLE FALLS: Primary | ICD-10-CM

## 2020-08-11 DIAGNOSIS — R78.89 ELEVATED PHENYTOIN LEVEL: ICD-10-CM

## 2020-08-11 DIAGNOSIS — R26.2 AMBULATORY DYSFUNCTION: Primary | ICD-10-CM

## 2020-08-11 DIAGNOSIS — R29.6 MULTIPLE FALLS: ICD-10-CM

## 2020-08-11 LAB
ALBUMIN SERPL BCP-MCNC: 3.6 G/DL (ref 3.5–5)
ALP SERPL-CCNC: 76 U/L (ref 46–116)
ALT SERPL W P-5'-P-CCNC: 28 U/L (ref 12–78)
ANION GAP SERPL CALCULATED.3IONS-SCNC: 11 MMOL/L (ref 4–13)
AST SERPL W P-5'-P-CCNC: 29 U/L (ref 5–45)
ATRIAL RATE: 78 BPM
BASOPHILS # BLD AUTO: 0.03 THOUSANDS/ΜL (ref 0–0.1)
BASOPHILS NFR BLD AUTO: 1 % (ref 0–1)
BILIRUB SERPL-MCNC: 0.5 MG/DL (ref 0.2–1)
BUN SERPL-MCNC: 17 MG/DL (ref 5–25)
CALCIUM SERPL-MCNC: 8.9 MG/DL (ref 8.3–10.1)
CHLORIDE SERPL-SCNC: 103 MMOL/L (ref 100–108)
CO2 SERPL-SCNC: 25 MMOL/L (ref 21–32)
CREAT SERPL-MCNC: 0.79 MG/DL (ref 0.6–1.3)
EOSINOPHIL # BLD AUTO: 0.1 THOUSAND/ΜL (ref 0–0.61)
EOSINOPHIL NFR BLD AUTO: 2 % (ref 0–6)
ERYTHROCYTE [DISTWIDTH] IN BLOOD BY AUTOMATED COUNT: 12.4 % (ref 11.6–15.1)
GFR SERPL CREATININE-BSD FRML MDRD: 97 ML/MIN/1.73SQ M
GLUCOSE SERPL-MCNC: 107 MG/DL (ref 65–140)
HCT VFR BLD AUTO: 36.9 % (ref 36.5–49.3)
HGB BLD-MCNC: 12.7 G/DL (ref 12–17)
IMM GRANULOCYTES # BLD AUTO: 0.01 THOUSAND/UL (ref 0–0.2)
IMM GRANULOCYTES NFR BLD AUTO: 0 % (ref 0–2)
LYMPHOCYTES # BLD AUTO: 1.44 THOUSANDS/ΜL (ref 0.6–4.47)
LYMPHOCYTES NFR BLD AUTO: 30 % (ref 14–44)
MCH RBC QN AUTO: 34.1 PG (ref 26.8–34.3)
MCHC RBC AUTO-ENTMCNC: 34.4 G/DL (ref 31.4–37.4)
MCV RBC AUTO: 99 FL (ref 82–98)
MONOCYTES # BLD AUTO: 0.77 THOUSAND/ΜL (ref 0.17–1.22)
MONOCYTES NFR BLD AUTO: 16 % (ref 4–12)
NEUTROPHILS # BLD AUTO: 2.42 THOUSANDS/ΜL (ref 1.85–7.62)
NEUTS SEG NFR BLD AUTO: 51 % (ref 43–75)
NRBC BLD AUTO-RTO: 0 /100 WBCS
P AXIS: 74 DEGREES
PHENYTOIN SERPL-MCNC: 29.8 UG/ML (ref 10–20)
PLATELET # BLD AUTO: 208 THOUSANDS/UL (ref 149–390)
PMV BLD AUTO: 10 FL (ref 8.9–12.7)
POTASSIUM SERPL-SCNC: 3.4 MMOL/L (ref 3.5–5.3)
PR INTERVAL: 154 MS
PROT SERPL-MCNC: 7.4 G/DL (ref 6.4–8.2)
QRS AXIS: 69 DEGREES
QRSD INTERVAL: 86 MS
QT INTERVAL: 368 MS
QTC INTERVAL: 419 MS
RBC # BLD AUTO: 3.72 MILLION/UL (ref 3.88–5.62)
SODIUM SERPL-SCNC: 139 MMOL/L (ref 136–145)
T WAVE AXIS: 3 DEGREES
TROPONIN I SERPL-MCNC: 0.02 NG/ML
VENTRICULAR RATE: 78 BPM
WBC # BLD AUTO: 4.77 THOUSAND/UL (ref 4.31–10.16)

## 2020-08-11 PROCEDURE — 99284 EMERGENCY DEPT VISIT MOD MDM: CPT | Performed by: PHYSICIAN ASSISTANT

## 2020-08-11 PROCEDURE — 70450 CT HEAD/BRAIN W/O DYE: CPT

## 2020-08-11 PROCEDURE — 85025 COMPLETE CBC W/AUTO DIFF WBC: CPT | Performed by: PHYSICIAN ASSISTANT

## 2020-08-11 PROCEDURE — 80185 ASSAY OF PHENYTOIN TOTAL: CPT | Performed by: PHYSICIAN ASSISTANT

## 2020-08-11 PROCEDURE — 80053 COMPREHEN METABOLIC PANEL: CPT | Performed by: PHYSICIAN ASSISTANT

## 2020-08-11 PROCEDURE — 71045 X-RAY EXAM CHEST 1 VIEW: CPT

## 2020-08-11 PROCEDURE — 84484 ASSAY OF TROPONIN QUANT: CPT | Performed by: PHYSICIAN ASSISTANT

## 2020-08-11 PROCEDURE — 99285 EMERGENCY DEPT VISIT HI MDM: CPT

## 2020-08-11 PROCEDURE — 93010 ELECTROCARDIOGRAM REPORT: CPT | Performed by: INTERNAL MEDICINE

## 2020-08-11 PROCEDURE — 99283 EMERGENCY DEPT VISIT LOW MDM: CPT

## 2020-08-11 PROCEDURE — 36415 COLL VENOUS BLD VENIPUNCTURE: CPT | Performed by: PHYSICIAN ASSISTANT

## 2020-08-11 PROCEDURE — 93005 ELECTROCARDIOGRAM TRACING: CPT

## 2020-08-11 PROCEDURE — G1004 CDSM NDSC: HCPCS

## 2020-08-12 ENCOUNTER — APPOINTMENT (EMERGENCY)
Dept: RADIOLOGY | Facility: HOSPITAL | Age: 60
DRG: 092 | End: 2020-08-12
Payer: COMMERCIAL

## 2020-08-12 ENCOUNTER — HOSPITAL ENCOUNTER (INPATIENT)
Facility: HOSPITAL | Age: 60
LOS: 3 days | Discharge: NURSING, REHAB OR LONG-TERM CARE | DRG: 092 | End: 2020-08-15
Attending: EMERGENCY MEDICINE | Admitting: INTERNAL MEDICINE
Payer: COMMERCIAL

## 2020-08-12 DIAGNOSIS — M51.37 DDD (DEGENERATIVE DISC DISEASE), LUMBOSACRAL: ICD-10-CM

## 2020-08-12 DIAGNOSIS — R26.2 AMBULATORY DYSFUNCTION: Primary | ICD-10-CM

## 2020-08-12 DIAGNOSIS — F11.20 NARCOTIC DEPENDENCY, CONTINUOUS (HCC): ICD-10-CM

## 2020-08-12 DIAGNOSIS — G40.909 SEIZURE DISORDER (HCC): Chronic | ICD-10-CM

## 2020-08-12 DIAGNOSIS — R53.1 WEAKNESS: ICD-10-CM

## 2020-08-12 PROBLEM — G92.9 TOXIC ENCEPHALOPATHY: Status: ACTIVE | Noted: 2020-08-12

## 2020-08-12 LAB
ALBUMIN SERPL BCP-MCNC: 3.3 G/DL (ref 3.5–5)
ALP SERPL-CCNC: 77 U/L (ref 46–116)
ALT SERPL W P-5'-P-CCNC: 32 U/L (ref 12–78)
ANION GAP SERPL CALCULATED.3IONS-SCNC: 12 MMOL/L (ref 4–13)
AST SERPL W P-5'-P-CCNC: 30 U/L (ref 5–45)
BASOPHILS # BLD AUTO: 0.04 THOUSANDS/ΜL (ref 0–0.1)
BASOPHILS NFR BLD AUTO: 1 % (ref 0–1)
BILIRUB SERPL-MCNC: 0.7 MG/DL (ref 0.2–1)
BUN SERPL-MCNC: 11 MG/DL (ref 5–25)
CALCIUM SERPL-MCNC: 8.6 MG/DL (ref 8.3–10.1)
CHLORIDE SERPL-SCNC: 100 MMOL/L (ref 100–108)
CO2 SERPL-SCNC: 27 MMOL/L (ref 21–32)
CREAT SERPL-MCNC: 0.74 MG/DL (ref 0.6–1.3)
EOSINOPHIL # BLD AUTO: 0.02 THOUSAND/ΜL (ref 0–0.61)
EOSINOPHIL NFR BLD AUTO: 0 % (ref 0–6)
ERYTHROCYTE [DISTWIDTH] IN BLOOD BY AUTOMATED COUNT: 12.2 % (ref 11.6–15.1)
GFR SERPL CREATININE-BSD FRML MDRD: 100 ML/MIN/1.73SQ M
GLUCOSE SERPL-MCNC: 134 MG/DL (ref 65–140)
HCT VFR BLD AUTO: 36.2 % (ref 36.5–49.3)
HGB BLD-MCNC: 12.6 G/DL (ref 12–17)
IMM GRANULOCYTES # BLD AUTO: 0.01 THOUSAND/UL (ref 0–0.2)
IMM GRANULOCYTES NFR BLD AUTO: 0 % (ref 0–2)
LYMPHOCYTES # BLD AUTO: 0.79 THOUSANDS/ΜL (ref 0.6–4.47)
LYMPHOCYTES NFR BLD AUTO: 15 % (ref 14–44)
MCH RBC QN AUTO: 34.1 PG (ref 26.8–34.3)
MCHC RBC AUTO-ENTMCNC: 34.8 G/DL (ref 31.4–37.4)
MCV RBC AUTO: 98 FL (ref 82–98)
MONOCYTES # BLD AUTO: 0.87 THOUSAND/ΜL (ref 0.17–1.22)
MONOCYTES NFR BLD AUTO: 17 % (ref 4–12)
NEUTROPHILS # BLD AUTO: 3.49 THOUSANDS/ΜL (ref 1.85–7.62)
NEUTS SEG NFR BLD AUTO: 67 % (ref 43–75)
NRBC BLD AUTO-RTO: 0 /100 WBCS
PLATELET # BLD AUTO: 199 THOUSANDS/UL (ref 149–390)
PMV BLD AUTO: 9.7 FL (ref 8.9–12.7)
POTASSIUM SERPL-SCNC: 3.4 MMOL/L (ref 3.5–5.3)
PROT SERPL-MCNC: 7.1 G/DL (ref 6.4–8.2)
RBC # BLD AUTO: 3.7 MILLION/UL (ref 3.88–5.62)
SODIUM SERPL-SCNC: 139 MMOL/L (ref 136–145)
WBC # BLD AUTO: 5.22 THOUSAND/UL (ref 4.31–10.16)

## 2020-08-12 PROCEDURE — 99285 EMERGENCY DEPT VISIT HI MDM: CPT | Performed by: PHYSICIAN ASSISTANT

## 2020-08-12 PROCEDURE — 36415 COLL VENOUS BLD VENIPUNCTURE: CPT | Performed by: PHYSICIAN ASSISTANT

## 2020-08-12 PROCEDURE — 73502 X-RAY EXAM HIP UNI 2-3 VIEWS: CPT

## 2020-08-12 PROCEDURE — 99222 1ST HOSP IP/OBS MODERATE 55: CPT | Performed by: INTERNAL MEDICINE

## 2020-08-12 PROCEDURE — 80053 COMPREHEN METABOLIC PANEL: CPT | Performed by: PHYSICIAN ASSISTANT

## 2020-08-12 PROCEDURE — 93005 ELECTROCARDIOGRAM TRACING: CPT

## 2020-08-12 PROCEDURE — 97163 PT EVAL HIGH COMPLEX 45 MIN: CPT

## 2020-08-12 PROCEDURE — 97167 OT EVAL HIGH COMPLEX 60 MIN: CPT

## 2020-08-12 PROCEDURE — 85025 COMPLETE CBC W/AUTO DIFF WBC: CPT | Performed by: PHYSICIAN ASSISTANT

## 2020-08-12 PROCEDURE — 99285 EMERGENCY DEPT VISIT HI MDM: CPT

## 2020-08-12 RX ORDER — NICOTINE 21 MG/24HR
1 PATCH, TRANSDERMAL 24 HOURS TRANSDERMAL DAILY
Status: DISCONTINUED | OUTPATIENT
Start: 2020-08-13 | End: 2020-08-15 | Stop reason: HOSPADM

## 2020-08-12 RX ORDER — ONDANSETRON 2 MG/ML
4 INJECTION INTRAMUSCULAR; INTRAVENOUS EVERY 6 HOURS PRN
Status: DISCONTINUED | OUTPATIENT
Start: 2020-08-12 | End: 2020-08-15 | Stop reason: HOSPADM

## 2020-08-12 RX ORDER — DEXTROSE AND SODIUM CHLORIDE 5; .9 G/100ML; G/100ML
75 INJECTION, SOLUTION INTRAVENOUS CONTINUOUS
Status: DISCONTINUED | OUTPATIENT
Start: 2020-08-12 | End: 2020-08-13

## 2020-08-12 RX ORDER — LORAZEPAM 1 MG/1
1 TABLET ORAL ONCE
Status: COMPLETED | OUTPATIENT
Start: 2020-08-12 | End: 2020-08-12

## 2020-08-12 RX ADMIN — DEXTROSE AND SODIUM CHLORIDE 75 ML/HR: 5; .9 INJECTION, SOLUTION INTRAVENOUS at 20:26

## 2020-08-12 RX ADMIN — LORAZEPAM 1 MG: 1 TABLET ORAL at 15:20

## 2020-08-13 PROBLEM — F11.20 NARCOTIC DEPENDENCY, CONTINUOUS (HCC): Status: ACTIVE | Noted: 2020-08-13

## 2020-08-13 PROBLEM — M51.37 DDD (DEGENERATIVE DISC DISEASE), LUMBOSACRAL: Status: ACTIVE | Noted: 2020-08-13

## 2020-08-13 LAB
ANION GAP SERPL CALCULATED.3IONS-SCNC: 6 MMOL/L (ref 4–13)
ATRIAL RATE: 91 BPM
BUN SERPL-MCNC: 12 MG/DL (ref 5–25)
CALCIUM SERPL-MCNC: 8.1 MG/DL (ref 8.3–10.1)
CHLORIDE SERPL-SCNC: 104 MMOL/L (ref 100–108)
CO2 SERPL-SCNC: 30 MMOL/L (ref 21–32)
CREAT SERPL-MCNC: 0.65 MG/DL (ref 0.6–1.3)
ERYTHROCYTE [DISTWIDTH] IN BLOOD BY AUTOMATED COUNT: 12 % (ref 11.6–15.1)
GFR SERPL CREATININE-BSD FRML MDRD: 105 ML/MIN/1.73SQ M
GLUCOSE SERPL-MCNC: 109 MG/DL (ref 65–140)
HCT VFR BLD AUTO: 38.3 % (ref 36.5–49.3)
HGB BLD-MCNC: 13.1 G/DL (ref 12–17)
MCH RBC QN AUTO: 33.9 PG (ref 26.8–34.3)
MCHC RBC AUTO-ENTMCNC: 34.2 G/DL (ref 31.4–37.4)
MCV RBC AUTO: 99 FL (ref 82–98)
P AXIS: 77 DEGREES
PHENYTOIN SERPL-MCNC: 36.5 UG/ML (ref 10–20)
PLATELET # BLD AUTO: 194 THOUSANDS/UL (ref 149–390)
PMV BLD AUTO: 9.9 FL (ref 8.9–12.7)
POTASSIUM SERPL-SCNC: 3.7 MMOL/L (ref 3.5–5.3)
PR INTERVAL: 138 MS
QRS AXIS: 66 DEGREES
QRSD INTERVAL: 76 MS
QT INTERVAL: 344 MS
QTC INTERVAL: 423 MS
RBC # BLD AUTO: 3.87 MILLION/UL (ref 3.88–5.62)
SODIUM SERPL-SCNC: 140 MMOL/L (ref 136–145)
T WAVE AXIS: 20 DEGREES
VALPROATE SERPL-MCNC: 65 UG/ML (ref 50–100)
VENTRICULAR RATE: 91 BPM
WBC # BLD AUTO: 3.53 THOUSAND/UL (ref 4.31–10.16)

## 2020-08-13 PROCEDURE — 97530 THERAPEUTIC ACTIVITIES: CPT

## 2020-08-13 PROCEDURE — 80048 BASIC METABOLIC PNL TOTAL CA: CPT | Performed by: INTERNAL MEDICINE

## 2020-08-13 PROCEDURE — 80164 ASSAY DIPROPYLACETIC ACD TOT: CPT | Performed by: INTERNAL MEDICINE

## 2020-08-13 PROCEDURE — 80185 ASSAY OF PHENYTOIN TOTAL: CPT | Performed by: INTERNAL MEDICINE

## 2020-08-13 PROCEDURE — 85027 COMPLETE CBC AUTOMATED: CPT | Performed by: INTERNAL MEDICINE

## 2020-08-13 PROCEDURE — 99232 SBSQ HOSP IP/OBS MODERATE 35: CPT | Performed by: INTERNAL MEDICINE

## 2020-08-13 PROCEDURE — 93010 ELECTROCARDIOGRAM REPORT: CPT | Performed by: INTERNAL MEDICINE

## 2020-08-13 RX ORDER — OXYCODONE HYDROCHLORIDE 5 MG/1
15 TABLET ORAL 4 TIMES DAILY
Status: DISCONTINUED | OUTPATIENT
Start: 2020-08-13 | End: 2020-08-15 | Stop reason: HOSPADM

## 2020-08-13 RX ORDER — DIVALPROEX SODIUM 500 MG/1
1500 TABLET, EXTENDED RELEASE ORAL
Status: DISCONTINUED | OUTPATIENT
Start: 2020-08-13 | End: 2020-08-15 | Stop reason: HOSPADM

## 2020-08-13 RX ORDER — DIVALPROEX SODIUM 500 MG/1
1000 TABLET, EXTENDED RELEASE ORAL EVERY MORNING
Status: DISCONTINUED | OUTPATIENT
Start: 2020-08-13 | End: 2020-08-15 | Stop reason: HOSPADM

## 2020-08-13 RX ORDER — LANOLIN ALCOHOL/MO/W.PET/CERES
6 CREAM (GRAM) TOPICAL
Status: DISCONTINUED | OUTPATIENT
Start: 2020-08-13 | End: 2020-08-15 | Stop reason: HOSPADM

## 2020-08-13 RX ADMIN — NICOTINE 1 PATCH: 21 PATCH, EXTENDED RELEASE TRANSDERMAL at 09:51

## 2020-08-13 RX ADMIN — OXYCODONE HYDROCHLORIDE 15 MG: 5 TABLET ORAL at 13:51

## 2020-08-13 RX ADMIN — MELATONIN 6 MG: at 23:15

## 2020-08-13 RX ADMIN — OXYCODONE HYDROCHLORIDE 15 MG: 5 TABLET ORAL at 19:12

## 2020-08-13 RX ADMIN — ENOXAPARIN SODIUM 40 MG: 40 INJECTION SUBCUTANEOUS at 09:51

## 2020-08-13 RX ADMIN — DIVALPROEX SODIUM 1000 MG: 500 TABLET, EXTENDED RELEASE ORAL at 13:51

## 2020-08-13 RX ADMIN — DIVALPROEX SODIUM 1500 MG: 500 TABLET, EXTENDED RELEASE ORAL at 22:53

## 2020-08-13 RX ADMIN — OXYCODONE HYDROCHLORIDE 15 MG: 5 TABLET ORAL at 22:53

## 2020-08-14 LAB
PHENYTOIN SERPL-MCNC: 33 UG/ML (ref 10–20)
SARS-COV-2 RNA RESP QL NAA+PROBE: NEGATIVE

## 2020-08-14 PROCEDURE — 97535 SELF CARE MNGMENT TRAINING: CPT

## 2020-08-14 PROCEDURE — 97116 GAIT TRAINING THERAPY: CPT

## 2020-08-14 PROCEDURE — 97530 THERAPEUTIC ACTIVITIES: CPT

## 2020-08-14 PROCEDURE — 99232 SBSQ HOSP IP/OBS MODERATE 35: CPT | Performed by: INTERNAL MEDICINE

## 2020-08-14 PROCEDURE — 87635 SARS-COV-2 COVID-19 AMP PRB: CPT | Performed by: INTERNAL MEDICINE

## 2020-08-14 PROCEDURE — 80185 ASSAY OF PHENYTOIN TOTAL: CPT | Performed by: INTERNAL MEDICINE

## 2020-08-14 RX ADMIN — OXYCODONE HYDROCHLORIDE 15 MG: 5 TABLET ORAL at 12:39

## 2020-08-14 RX ADMIN — MELATONIN 6 MG: at 21:04

## 2020-08-14 RX ADMIN — ENOXAPARIN SODIUM 40 MG: 40 INJECTION SUBCUTANEOUS at 08:22

## 2020-08-14 RX ADMIN — OXYCODONE HYDROCHLORIDE 15 MG: 5 TABLET ORAL at 17:59

## 2020-08-14 RX ADMIN — DIVALPROEX SODIUM 1000 MG: 500 TABLET, EXTENDED RELEASE ORAL at 08:23

## 2020-08-14 RX ADMIN — DIVALPROEX SODIUM 1500 MG: 500 TABLET, EXTENDED RELEASE ORAL at 21:06

## 2020-08-14 RX ADMIN — NICOTINE 1 PATCH: 21 PATCH, EXTENDED RELEASE TRANSDERMAL at 08:24

## 2020-08-14 RX ADMIN — OXYCODONE HYDROCHLORIDE 15 MG: 5 TABLET ORAL at 08:22

## 2020-08-14 RX ADMIN — OXYCODONE HYDROCHLORIDE 15 MG: 5 TABLET ORAL at 21:04

## 2020-08-15 VITALS
TEMPERATURE: 96 F | BODY MASS INDEX: 16.32 KG/M2 | OXYGEN SATURATION: 96 % | RESPIRATION RATE: 19 BRPM | WEIGHT: 120.3 LBS | SYSTOLIC BLOOD PRESSURE: 121 MMHG | DIASTOLIC BLOOD PRESSURE: 85 MMHG | HEART RATE: 84 BPM

## 2020-08-15 LAB — PHENYTOIN SERPL-MCNC: 26.6 UG/ML (ref 10–20)

## 2020-08-15 PROCEDURE — 99239 HOSP IP/OBS DSCHRG MGMT >30: CPT | Performed by: INTERNAL MEDICINE

## 2020-08-15 PROCEDURE — 80185 ASSAY OF PHENYTOIN TOTAL: CPT | Performed by: INTERNAL MEDICINE

## 2020-08-15 RX ORDER — PHENYTOIN 50 MG/1
100 TABLET, CHEWABLE ORAL 2 TIMES DAILY
Qty: 30 TABLET | Refills: 0 | Status: SHIPPED | OUTPATIENT
Start: 2020-08-15

## 2020-08-15 RX ORDER — OXYCODONE HYDROCHLORIDE 15 MG/1
15 TABLET ORAL EVERY 6 HOURS PRN
Qty: 30 TABLET | Refills: 0 | Status: SHIPPED | OUTPATIENT
Start: 2020-08-15 | End: 2020-08-25

## 2020-08-15 RX ADMIN — DIVALPROEX SODIUM 1000 MG: 500 TABLET, EXTENDED RELEASE ORAL at 08:19

## 2020-08-15 RX ADMIN — OXYCODONE HYDROCHLORIDE 15 MG: 5 TABLET ORAL at 08:19

## 2020-08-15 RX ADMIN — NICOTINE 1 PATCH: 21 PATCH, EXTENDED RELEASE TRANSDERMAL at 08:20

## 2020-08-15 RX ADMIN — OXYCODONE HYDROCHLORIDE 15 MG: 5 TABLET ORAL at 11:31

## 2020-08-15 RX ADMIN — ENOXAPARIN SODIUM 40 MG: 40 INJECTION SUBCUTANEOUS at 08:20

## 2020-10-09 ENCOUNTER — TRANSCRIBE ORDERS (OUTPATIENT)
Dept: ADMINISTRATIVE | Facility: HOSPITAL | Age: 60
End: 2020-10-09

## 2020-10-09 ENCOUNTER — HOSPITAL ENCOUNTER (OUTPATIENT)
Dept: RADIOLOGY | Facility: HOSPITAL | Age: 60
Discharge: HOME | End: 2020-10-09
Payer: COMMERCIAL

## 2020-10-09 DIAGNOSIS — M79.672 LEFT FOOT PAIN: Primary | ICD-10-CM

## 2020-10-09 DIAGNOSIS — M79.672 LEFT FOOT PAIN: ICD-10-CM

## 2020-10-09 PROCEDURE — 73630 X-RAY EXAM OF FOOT: CPT

## 2020-11-02 ENCOUNTER — TRANSCRIBE ORDERS (OUTPATIENT)
Dept: ADMINISTRATIVE | Facility: HOSPITAL | Age: 60
End: 2020-11-02

## 2020-11-02 DIAGNOSIS — S92.215A NONDISPLACED FRACTURE OF CUBOID BONE OF LEFT FOOT, INITIAL ENCOUNTER FOR CLOSED FRACTURE: ICD-10-CM

## 2020-11-02 DIAGNOSIS — M79.672 PAIN IN LEFT FOOT: Primary | ICD-10-CM

## 2020-11-13 ENCOUNTER — HOSPITAL ENCOUNTER (OUTPATIENT)
Dept: MRI IMAGING | Facility: HOSPITAL | Age: 60
Discharge: HOME | End: 2020-11-13
Attending: PODIATRIST
Payer: COMMERCIAL

## 2020-11-13 DIAGNOSIS — M79.672 PAIN IN LEFT FOOT: ICD-10-CM

## 2020-11-13 DIAGNOSIS — S92.215A NONDISPLACED FRACTURE OF CUBOID BONE OF LEFT FOOT, INITIAL ENCOUNTER FOR CLOSED FRACTURE: ICD-10-CM

## 2020-11-13 PROCEDURE — 73720 MRI LWR EXTREMITY W/O&W/DYE: CPT

## 2020-11-13 PROCEDURE — G1004 CDSM NDSC: HCPCS

## 2020-11-13 PROCEDURE — A9585 GADOBUTROL INJECTION: HCPCS | Performed by: PODIATRIST

## 2020-11-13 RX ADMIN — GADOBUTROL 3 ML: 604.72 INJECTION INTRAVENOUS at 17:39

## 2021-06-10 ENCOUNTER — IMMUNIZATIONS (OUTPATIENT)
Dept: FAMILY MEDICINE CLINIC | Facility: HOSPITAL | Age: 61
End: 2021-06-10

## 2021-06-10 DIAGNOSIS — Z23 ENCOUNTER FOR IMMUNIZATION: Primary | ICD-10-CM

## 2021-06-10 PROCEDURE — 91300 SARS-COV-2 / COVID-19 MRNA VACCINE (PFIZER-BIONTECH) 30 MCG: CPT

## 2021-06-10 PROCEDURE — 0001A SARS-COV-2 / COVID-19 MRNA VACCINE (PFIZER-BIONTECH) 30 MCG: CPT

## 2021-07-01 ENCOUNTER — IMMUNIZATIONS (OUTPATIENT)
Dept: FAMILY MEDICINE CLINIC | Facility: HOSPITAL | Age: 61
End: 2021-07-01

## 2021-07-01 DIAGNOSIS — Z23 ENCOUNTER FOR IMMUNIZATION: Primary | ICD-10-CM

## 2021-07-01 PROCEDURE — 0002A SARS-COV-2 / COVID-19 MRNA VACCINE (PFIZER-BIONTECH) 30 MCG: CPT

## 2021-07-01 PROCEDURE — 91300 SARS-COV-2 / COVID-19 MRNA VACCINE (PFIZER-BIONTECH) 30 MCG: CPT

## 2021-08-27 ENCOUNTER — HOSPITAL ENCOUNTER (EMERGENCY)
Facility: HOSPITAL | Age: 61
Discharge: HOME | End: 2021-08-27
Attending: EMERGENCY MEDICINE | Admitting: EMERGENCY MEDICINE
Payer: COMMERCIAL

## 2021-08-27 ENCOUNTER — APPOINTMENT (EMERGENCY)
Dept: RADIOLOGY | Facility: HOSPITAL | Age: 61
End: 2021-08-27
Payer: COMMERCIAL

## 2021-08-27 VITALS
HEART RATE: 87 BPM | DIASTOLIC BLOOD PRESSURE: 75 MMHG | TEMPERATURE: 97.5 F | OXYGEN SATURATION: 95 % | SYSTOLIC BLOOD PRESSURE: 114 MMHG | RESPIRATION RATE: 18 BRPM

## 2021-08-27 DIAGNOSIS — S39.012A STRAIN OF LUMBAR REGION, INITIAL ENCOUNTER: ICD-10-CM
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
## 2021-08-27 DIAGNOSIS — S29.011A MUSCLE STRAIN OF CHEST WALL, INITIAL ENCOUNTER: ICD-10-CM

## 2021-08-27 PROCEDURE — 99284 EMERGENCY DEPT VISIT MOD MDM: CPT | Performed by: EMERGENCY MEDICINE

## 2021-08-27 PROCEDURE — 99283 EMERGENCY DEPT VISIT LOW MDM: CPT

## 2021-08-27 PROCEDURE — 71046 X-RAY EXAM CHEST 2 VIEWS: CPT

## 2021-08-27 RX ORDER — IBUPROFEN 400 MG/1
400 TABLET ORAL ONCE
Status: COMPLETED | OUTPATIENT
Start: 2021-08-27 | End: 2021-08-27

## 2021-08-27 RX ADMIN — IBUPROFEN 400 MG: 400 TABLET ORAL at 12:40

## 2021-08-27 NOTE — ED PROVIDER NOTES
History  Chief Complaint   Patient presents with   [employer] Fall     pt said he tripped and fell off sidewalk yesterday  pt complaining of rib pain  denies head stike or blood thinners  pt also complaining of being stung the other day by wasp and is "swollen"     This 76-year-old male with history of traumatic brain injury in 1984, seizure disorder, gait dysfunction, osteo arthritis, chronic pain syndrome due to degenerative disc disease of the lumbosacral spine and narcotic dependency states that he tripped walking onto a sidewalk approximately 8 days ago  He says he fell forward and struck the left lower chest wall on the ground  He denies injury to head, spine, abdomen, pelvis and extremities  He denies loss of consciousness or new focal neurologic change  He says his chronic low back pain is somewhat worse than usual   He states over last few days he has felt  like he has a panic attack while sleeping at night  During these episodes he feels unable to breathe well for a few minutes  At other times he has been breathng normally  Also states he was stung by a hornet in the neck approximately 8 days ago and says that he feels there is some pus there  He feels his neck is swollen  He does reveal that he misused his oxycodone and is out of oxycodone  He last filled a prescription for 30 days for this on August 12, 2021  Prior to Admission Medications   Prescriptions Last Dose Informant Patient Reported? Taking? Cholecalciferol (VITAMIN D3) 2000 units capsule 8/27/2021 at Unknown time  Yes Yes   Sig: Take 2,000 Units by mouth daily   divalproex sodium (DEPAKOTE ER) 500 mg 24 hr tablet 8/27/2021 at Unknown time  No Yes   Sig: Patient takes 2 tablets in the morning, 3 tablets in the evening  phenytoin (DILANTIN) 50 mg tablet 8/27/2021 at Unknown time  No Yes   Sig: Chew 2 tablets (100 mg total) 2 (two) times a day Start on August 17th, 2020!    rosuvastatin (CRESTOR) 20 MG tablet Not Taking at Unknown time Yes No   Sig: Take 20 mg by mouth daily   Patient not taking: Reported on 8/27/2021      Facility-Administered Medications: None       Past Medical History:   Diagnosis Date    Anxiety     Back pain     History of herniated intervertebral disc     Seizures (HCC)     TBI (traumatic brain injury) ([facility] [address] )        Past Surgical History:   Procedure Laterality Date    BACK SURGERY      BRAIN SURGERY      LEG SURGERY         Family History   Family history unknown: Yes     I have reviewed and agree with the history as documented  E-Cigarette/Vaping    E-Cigarette Use Never User      E-Cigarette/Vaping Substances    Nicotine No     THC No     CBD No     Flavoring No     Other No     Unknown No      Social History     Tobacco Use    Smoking status: Current Every Day Smoker     Packs/day: 0 50     Years: 15 00     Pack years: 7 50     Types: Cigarettes    Smokeless tobacco: Current User    Tobacco comment: 14 years   Vaping Use    Vaping Use: Never used   Substance Use Topics    Alcohol use: Never    Drug use: Yes     Types: Marijuana     Comment: medical marijuana card       Review of Systems   Constitutional: Negative  HENT: Negative  Eyes: Negative  Respiratory: Positive for shortness of breath  Negative for cough  Cardiovascular: Positive for chest pain (Due to falling last week)  Negative for palpitations and leg swelling  Gastrointestinal: Negative  Endocrine: Negative  Genitourinary: Negative  Musculoskeletal: Positive for back pain ( chronic) and gait problem ( chronic)  Negative for neck stiffness  Skin: Negative  Allergic/Immunologic: Negative  Hematological: Negative  Psychiatric/Behavioral: Negative  All other systems reviewed and are negative  Physical Exam  Physical Exam  Vitals and nursing note reviewed  Constitutional:       General: He is not in acute distress  Appearance: He is well-developed  He is not ill-appearing or diaphoretic  HENT:      Head: Normocephalic and atraumatic  Right Ear: External ear normal       Left Ear: External ear normal       Nose: Nose normal       Mouth/Throat:      Mouth: Mucous membranes are moist       Pharynx: Oropharynx is clear  No oropharyngeal exudate or posterior oropharyngeal erythema  Eyes:      General: No scleral icterus  Conjunctiva/sclera: Conjunctivae normal       Pupils: Pupils are equal, round, and reactive to light  Neck:      Vascular: No JVD  Cardiovascular:      Rate and Rhythm: Normal rate and regular rhythm  Pulses: Normal pulses  Heart sounds: Normal heart sounds  No murmur heard  Pulmonary:      Effort: Pulmonary effort is normal       Breath sounds: Normal breath sounds  Chest:      Chest wall: Tenderness (Lower right anterolateral chest wall  No crepitance, ecchymosis, abrasion, deformity or objective sign of injury  ) present  Abdominal:      General: Bowel sounds are normal  There is no distension  Palpations: Abdomen is soft  There is no mass  Tenderness: There is no abdominal tenderness  There is no guarding or rebound  Musculoskeletal:         General: No tenderness or signs of injury  Normal range of motion  Cervical back: Normal range of motion and neck supple  No rigidity or tenderness  Right lower leg: No edema  Left lower leg: No edema  Lymphadenopathy:      Cervical: No cervical adenopathy  Skin:     General: Skin is warm and dry  Capillary Refill: Capillary refill takes less than 2 seconds  Findings: No rash  Neurological:      Mental Status: He is alert and oriented to person, place, and time  Mental status is at baseline  Cranial Nerves: No cranial nerve deficit  Sensory: No sensory deficit  Motor: No weakness  Coordination: Coordination normal       Deep Tendon Reflexes: Reflexes are normal and symmetric     Psychiatric:         Mood and Affect: Mood normal          Behavior: Behavior normal          Vital Signs  ED Triage Vitals [08/27/21 1119]   Temperature Pulse Respirations Blood Pressure SpO2   97 5 °F (36 4 °C) 87 18 114/75 95 %      Temp src Heart Rate Source Patient Position - Orthostatic VS BP Location FiO2 (%)   -- -- -- -- --      Pain Score       --           Vitals:    08/27/21 1119   BP: 114/75   Pulse: 87         Visual Acuity  Visual Acuity      Most Recent Value   L Pupil Size (mm)  3   R Pupil Size (mm)  3          ED Medications  Medications   ibuprofen (MOTRIN) tablet 400 mg (400 mg Oral Given 8/27/21 1240)       Diagnostic Studies  Results Reviewed     None                 XR chest 2 views   ED Interpretation by [employer] DO (08/27 1244)   No acute rib fracture, pneumothorax, effusion or infiltrate  Procedures  Procedures         ED Course                             SBIRT 20yo+      Most Recent Value   SBIRT (24 yo +)   In order to provide better care to our patients, we are screening all of our patients for alcohol and drug use  Would it be okay to ask you these screening questions? Yes Filed at: 08/27/2021 1234   Initial Alcohol Screen: US AUDIT-C    1  How often do you have a drink containing alcohol?  0 Filed at: 08/27/2021 1234   2  How many drinks containing alcohol do you have on a typical day you are drinking? 0 Filed at: 08/27/2021 1234   3a  Male UNDER 65: How often do you have five or more drinks on one occasion? 0 Filed at: 08/27/2021 1234   3b  FEMALE Any Age, or MALE 65+: How often do you have 4 or more drinks on one occassion? 0 Filed at: 08/27/2021 1234   Audit-C Score  0 Filed at: 08/27/2021 1234   ANNE-MARIE: How many times in the past year have you    Used an illegal drug or used a prescription medication for non-medical reasons?   Never Filed at: 08/27/2021 1234                    MDM  Number of Diagnoses or Management Options  Fall from standing, initial encounter: established and improving  Muscle strain of chest wall, initial encounter: new and requires workup  Strain of lumbar region, initial encounter: new and requires workup  Diagnosis management comments: 59-year-old male with baseline ambulatory dysfunction had a mechanical trip and fall a week ago  Complains of continued chest wall pain  There is no evidence of trauma, vital signs, exam and imaging did not reveal any significant injury  Patient also complains of worsening his chronic low back pain since falling  He says that he is out of his narcotic medications  Explained that it will be appropriate to prescribe more narcotics since he had recent filled from his doctor  Patient voices understanding will follow up with PCP as needed  There is no evidence any allergic reaction to insect sting  Amount and/or Complexity of Data Reviewed  Tests in the radiology section of CPT®: ordered and reviewed  Independent visualization of images, tracings, or specimens: yes        Disposition  Final diagnoses:   Fall from standing, initial encounter   Strain of lumbar region, initial encounter   Muscle strain of chest wall, initial encounter     Time reflects when diagnosis was documented in both MDM as applicable and the Disposition within this note     Time User Action Codes Description Comment    8/27/2021 12:51 PM Gevena Matte Add [A12  CZBQ] Fall from standing, initial encounter     8/27/2021 12:51 PM Gevena Matte Add [P40 334M] Strain of lumbar region, initial encounter     8/27/2021 12:51 PM Gevena Matte Add [I11 [id number]] Muscle strain of chest wall, initial encounter       ED Disposition     ED Disposition Condition Date/Time Comment    Discharge Stable Fri Aug 27, 2021 12:51 PM Yung Tam discharge to home/self care              Follow-up Information     Follow up With Specialties Details Why Contact Roula Sebastian, DO Family Medicine Call  As needed [address] [place] [id number]  [phone]            Patient's Medications Discharge Prescriptions    No medications on file     No discharge procedures on file      PDMP Review       Value Time User    PDMP Reviewed  Yes 8/27/2021 12:26 PM Larissa Kuhn DO          ED Provider  Electronically Signed by           Larissa Kuhn DO  08/27/21 1785

## 2021-08-27 NOTE — DISCHARGE INSTRUCTIONS
Try Tylenol and ibuprofen as needed for pain  Contact your pain management group or your PCP if you need more narcotic medication

## 2022-01-24 ENCOUNTER — HOSPITAL ENCOUNTER (OUTPATIENT)
Dept: RADIOLOGY | Facility: HOSPITAL | Age: 62
Discharge: HOME | End: 2022-01-24
Payer: COMMERCIAL

## 2022-01-24 DIAGNOSIS — M54.16 LUMBAR VERTEBRAL SYNDROME: ICD-10-CM

## 2022-01-24 PROCEDURE — 72110 X-RAY EXAM L-2 SPINE 4/>VWS: CPT

## 2022-06-20 NOTE — ASSESSMENT & PLAN NOTE
Bed: 35  Expected date:   Expected time:   Means of arrival: [facility]  Comments:  [facility]    · Secondary to a traumatic brain injury  · Patient reports that this is not a typical presentation of his previous seizures and he does not feel so he had a seizure at this time  · Encourage outpatient follow-up with neurology   · Seizure precautions are in place  · Continue home dose of [id number] mg q  AM and 1500 mg q  HS  · Valproic acid level in therapeutic range

## 2023-02-02 ENCOUNTER — HOSPITAL ENCOUNTER (EMERGENCY)
Facility: HOSPITAL | Age: 63
Discharge: HOME | End: 2023-02-02
Attending: EMERGENCY MEDICINE

## 2023-02-02 ENCOUNTER — APPOINTMENT (EMERGENCY)
Dept: CT IMAGING | Facility: HOSPITAL | Age: 63
End: 2023-02-02

## 2023-02-02 VITALS
HEART RATE: 70 BPM | OXYGEN SATURATION: 98 % | RESPIRATION RATE: 18 BRPM | SYSTOLIC BLOOD PRESSURE: 123 MMHG | BODY MASS INDEX: 18.16 KG/M2 | DIASTOLIC BLOOD PRESSURE: 67 MMHG | HEIGHT: 73 IN | WEIGHT: 137 LBS | TEMPERATURE: 97.8 F

## 2023-02-02 DIAGNOSIS — R93.5 ABNORMAL CT OF THE ABDOMEN: ICD-10-CM

## 2023-02-02 DIAGNOSIS — M54.9 BACK PAIN: ICD-10-CM

## 2023-02-02 DIAGNOSIS — R91.1 PULMONARY NODULE, RIGHT: ICD-10-CM

## 2023-02-02 DIAGNOSIS — R07.9 CHEST PAIN: Primary | ICD-10-CM

## 2023-02-02 LAB
2HR DELTA HS TROPONIN: -1 NG/L
ALBUMIN SERPL BCP-MCNC: 3.8 G/DL (ref 3.5–5)
ALP SERPL-CCNC: 94 U/L (ref 46–116)
ALT SERPL W P-5'-P-CCNC: 22 U/L (ref 12–78)
ANION GAP SERPL CALCULATED.3IONS-SCNC: 9 MMOL/L (ref 4–13)
APTT PPP: 23 SECONDS (ref 23–37)
AST SERPL W P-5'-P-CCNC: 20 U/L (ref 5–45)
ATRIAL RATE: 90 BPM
BASOPHILS # BLD AUTO: 0.02 THOUSANDS/ÂΜL (ref 0–0.1)
BASOPHILS NFR BLD AUTO: 0 % (ref 0–1)
BILIRUB SERPL-MCNC: 0.4 MG/DL (ref 0.2–1)
BUN SERPL-MCNC: 12 MG/DL (ref 5–25)
CALCIUM SERPL-MCNC: 9.1 MG/DL (ref 8.3–10.1)
CARDIAC TROPONIN I PNL SERPL HS: 10 NG/L
CARDIAC TROPONIN I PNL SERPL HS: 11 NG/L
CHLORIDE SERPL-SCNC: 100 MMOL/L (ref 96–108)
CO2 SERPL-SCNC: 28 MMOL/L (ref 21–32)
CREAT SERPL-MCNC: 0.71 MG/DL (ref 0.6–1.3)
D DIMER PPP FEU-MCNC: 0.7 UG/ML FEU
EOSINOPHIL # BLD AUTO: 0.1 THOUSAND/ÂΜL (ref 0–0.61)
EOSINOPHIL NFR BLD AUTO: 1 % (ref 0–6)
ERYTHROCYTE [DISTWIDTH] IN BLOOD BY AUTOMATED COUNT: 11.7 % (ref 11.6–15.1)
GFR SERPL CREATININE-BSD FRML MDRD: 99 ML/MIN/1.73SQ M
GLUCOSE SERPL-MCNC: 93 MG/DL (ref 65–140)
HCT VFR BLD AUTO: 42.8 % (ref 36.5–49.3)
HGB BLD-MCNC: 14.5 G/DL (ref 12–17)
IMM GRANULOCYTES # BLD AUTO: 0.01 THOUSAND/UL (ref 0–0.2)
IMM GRANULOCYTES NFR BLD AUTO: 0 % (ref 0–2)
INR PPP: 0.91 (ref 0.84–1.19)
LYMPHOCYTES # BLD AUTO: 1.4 THOUSANDS/ÂΜL (ref 0.6–4.47)
LYMPHOCYTES NFR BLD AUTO: 20 % (ref 14–44)
MCH RBC QN AUTO: 33.6 PG (ref 26.8–34.3)
MCHC RBC AUTO-ENTMCNC: 33.9 G/DL (ref 31.4–37.4)
MCV RBC AUTO: 99 FL (ref 82–98)
MONOCYTES # BLD AUTO: 0.69 THOUSAND/ÂΜL (ref 0.17–1.22)
MONOCYTES NFR BLD AUTO: 10 % (ref 4–12)
NEUTROPHILS # BLD AUTO: 4.81 THOUSANDS/ÂΜL (ref 1.85–7.62)
NEUTS SEG NFR BLD AUTO: 69 % (ref 43–75)
NRBC BLD AUTO-RTO: 0 /100 WBCS
P AXIS: 63 DEGREES
PLATELET # BLD AUTO: 217 THOUSANDS/UL (ref 149–390)
PMV BLD AUTO: 9.9 FL (ref 8.9–12.7)
POTASSIUM SERPL-SCNC: 3.7 MMOL/L (ref 3.5–5.3)
PR INTERVAL: 134 MS
PROT SERPL-MCNC: 7.9 G/DL (ref 6.4–8.4)
PROTHROMBIN TIME: 12.9 SECONDS (ref 11.6–14.5)
QRS AXIS: 68 DEGREES
QRSD INTERVAL: 86 MS
QT INTERVAL: 336 MS
QTC INTERVAL: 411 MS
RBC # BLD AUTO: 4.31 MILLION/UL (ref 3.88–5.62)
SODIUM SERPL-SCNC: 137 MMOL/L (ref 135–147)
T WAVE AXIS: -56 DEGREES
VENTRICULAR RATE: 90 BPM
WBC # BLD AUTO: 7.03 THOUSAND/UL (ref 4.31–10.16)

## 2023-02-02 RX ORDER — KETOROLAC TROMETHAMINE 30 MG/ML
15 INJECTION, SOLUTION INTRAMUSCULAR; INTRAVENOUS ONCE
Status: COMPLETED | OUTPATIENT
Start: 2023-02-02 | End: 2023-02-02

## 2023-02-02 RX ORDER — METHOCARBAMOL 500 MG/1
500 TABLET, FILM COATED ORAL 3 TIMES DAILY
Qty: 20 TABLET | Refills: 0 | Status: SHIPPED | OUTPATIENT
Start: 2023-02-02

## 2023-02-02 RX ORDER — LIDOCAINE 50 MG/G
1 PATCH TOPICAL ONCE
Status: DISCONTINUED | OUTPATIENT
Start: 2023-02-02 | End: 2023-02-02 | Stop reason: HOSPADM

## 2023-02-02 RX ADMIN — KETOROLAC TROMETHAMINE 15 MG: 30 INJECTION, SOLUTION INTRAMUSCULAR; INTRAVENOUS at 14:29

## 2023-02-02 RX ADMIN — LIDOCAINE 5% 1 PATCH: 700 PATCH TOPICAL at 14:29

## 2023-02-02 RX ADMIN — KETOROLAC TROMETHAMINE 15 MG: 30 INJECTION, SOLUTION INTRAMUSCULAR; INTRAVENOUS at 16:24

## 2023-02-02 RX ADMIN — IOHEXOL 100 ML: 350 INJECTION, SOLUTION INTRAVENOUS at 16:09

## 2023-02-02 NOTE — ED PROVIDER NOTES
History  Chief Complaint   Patient presents with   • Chest Pain     Patient complaint of chest pain and back since yesterday morning at 10am     Patient is a 59 y/o M with h/o TBI, seizures that presents to the ED with chest pain radiating to his back  He states the pain started last night  Deep breaths make the pain worse, nothing makes it better  He denies SOB  He states the pain is constant  No fevers, chills, cough  No leg pain or swelling  No recent surgeries  Patient denies history of heart disease or aortic aneurysm  History provided by:  Patient  Chest Pain  Pain location:  L chest  Pain quality: sharp and stabbing    Pain radiates to:  Upper back  Pain radiates to the back: yes    Pain severity:  Moderate  Onset quality:  Sudden  Duration:  2 days  Timing:  Constant  Progression:  Worsening  Chronicity:  New  Context: at rest    Relieved by:  Nothing  Worsened by:  Deep breathing and movement  Ineffective treatments:  None tried  Associated symptoms: back pain    Associated symptoms: no abdominal pain, no altered mental status, no cough, no diaphoresis, no dizziness, no fever, no headache, no lower extremity edema, no nausea, no numbness, no palpitations, no shortness of breath, not vomiting and no weakness    Risk factors: male sex    Risk factors: no coronary artery disease and no diabetes mellitus        Prior to Admission Medications   Prescriptions Last Dose Informant Patient Reported? Taking? Cholecalciferol (VITAMIN D3) 2000 units capsule   Yes No   Sig: Take 2,000 Units by mouth daily   divalproex sodium (DEPAKOTE ER) 500 mg 24 hr tablet   No No   Sig: Patient takes 2 tablets in the morning, 3 tablets in the evening  phenytoin (DILANTIN) 50 mg tablet   No No   Sig: Chew 2 tablets (100 mg total) 2 (two) times a day Start on August 17th, 2020!    rosuvastatin (CRESTOR) 20 MG tablet   Yes No   Sig: Take 20 mg by mouth daily   Patient not taking: Reported on 8/27/2021 Facility-Administered Medications: None       Past Medical History:   Diagnosis Date   • Anxiety    • Back pain    • History of herniated intervertebral disc    • Seizures (HCC)    • TBI (traumatic brain injury)        Past Surgical History:   Procedure Laterality Date   • BACK SURGERY     • BRAIN SURGERY     • LEG SURGERY         Family History   Family history unknown: Yes     I have reviewed and agree with the history as documented  E-Cigarette/Vaping   • E-Cigarette Use Never User      E-Cigarette/Vaping Substances   • Nicotine No    • THC No    • CBD No    • Flavoring No    • Other No    • Unknown No      Social History     Tobacco Use   • Smoking status: Every Day     Packs/day: 0 50     Years: 15 00     Pack years: 7 50     Types: Cigarettes   • Smokeless tobacco: Current   • Tobacco comments:     14 years   Vaping Use   • Vaping Use: Never used   Substance Use Topics   • Alcohol use: Never   • Drug use: Yes     Types: Marijuana     Comment: medical marijuana card       Review of Systems   Constitutional: Negative for diaphoresis and fever  HENT: Negative  Respiratory: Negative for cough and shortness of breath  Cardiovascular: Positive for chest pain  Negative for palpitations and leg swelling  Gastrointestinal: Negative for abdominal pain, diarrhea, nausea and vomiting  Genitourinary: Negative for dysuria  Musculoskeletal: Positive for back pain  Negative for neck pain  Skin: Negative for color change, pallor and rash  Neurological: Negative for dizziness, tremors, seizures, syncope, facial asymmetry, speech difficulty, weakness, light-headedness, numbness and headaches  Psychiatric/Behavioral: Negative for confusion and decreased concentration  All other systems reviewed and are negative  Physical Exam  Physical Exam  Vitals and nursing note reviewed  Constitutional:       General: He is not in acute distress  Appearance: Normal appearance   He is well-developed, well-groomed and normal weight  He is not ill-appearing or diaphoretic  HENT:      Head: Normocephalic and atraumatic  Right Ear: External ear normal       Left Ear: External ear normal       Nose: Nose normal       Mouth/Throat:      Mouth: Mucous membranes are moist       Pharynx: Oropharynx is clear  Eyes:      Conjunctiva/sclera: Conjunctivae normal       Pupils: Pupils are equal    Cardiovascular:      Rate and Rhythm: Normal rate and regular rhythm  Heart sounds: Normal heart sounds  Pulmonary:      Effort: Pulmonary effort is normal       Breath sounds: Normal breath sounds  No wheezing, rhonchi or rales  Chest:      Chest wall: Tenderness (left upper anterior chest wall  ) present  Abdominal:      General: Abdomen is flat  Bowel sounds are normal       Palpations: Abdomen is soft  Tenderness: There is no abdominal tenderness  Musculoskeletal:      Cervical back: Normal and normal range of motion  Thoracic back: Tenderness (left upper back   ) present  No swelling, deformity or bony tenderness  Normal range of motion  Lumbar back: Normal    Skin:     General: Skin is warm and dry  Coloration: Skin is not pale  Findings: No bruising or rash  Neurological:      Mental Status: He is alert and oriented to person, place, and time  Sensory: Sensation is intact  Motor: Motor function is intact  Gait: Gait is intact  Psychiatric:         Mood and Affect: Mood normal          Speech: Speech normal          Behavior: Behavior is cooperative           Vital Signs  ED Triage Vitals   Temperature Pulse Respirations Blood Pressure SpO2   02/02/23 1304 02/02/23 1304 02/02/23 1304 02/02/23 1304 02/02/23 1304   97 8 °F (36 6 °C) 99 18 119/77 99 %      Temp src Heart Rate Source Patient Position - Orthostatic VS BP Location FiO2 (%)   -- 02/02/23 1330 02/02/23 1330 02/02/23 1330 --    Monitor Lying Left arm       Pain Score       02/02/23 1429       8 Vitals:    02/02/23 1530 02/02/23 1615 02/02/23 1630 02/02/23 1700   BP: 127/71 118/59 120/60 123/67   Pulse: 76  80 70   Patient Position - Orthostatic VS:   Lying Lying         Visual Acuity      ED Medications  Medications   lidocaine (LIDODERM) 5 % patch 1 patch (1 patch Topical Medication Applied 2/2/23 1429)   ketorolac (TORADOL) injection 15 mg (15 mg Intravenous Given 2/2/23 1429)   iohexol (OMNIPAQUE) 350 MG/ML injection (SINGLE-DOSE) 100 mL (100 mL Intravenous Given 2/2/23 1609)   ketorolac (TORADOL) injection 15 mg (15 mg Intravenous Given 2/2/23 1624)       Diagnostic Studies  Results Reviewed     Procedure Component Value Units Date/Time    HS Troponin I 2hr [[id number]]  (Normal) Collected: 02/02/23 1624    Lab Status: Final result Specimen: Blood from Arm, Right Updated: 02/02/23 1701     hs TnI 2hr 10 ng/L      Delta 2hr hsTnI -1 ng/L     Comprehensive metabolic panel [[id number]] Collected: 02/02/23 1433    Lab Status: Final result Specimen: Blood from Arm, Right Updated: 02/02/23 1525     Sodium 137 mmol/L      Potassium 3 7 mmol/L      Chloride 100 mmol/L      CO2 28 mmol/L      ANION GAP 9 mmol/L      BUN 12 mg/dL      Creatinine 0 71 mg/dL      Glucose 93 mg/dL      Calcium 9 1 mg/dL      AST 20 U/L      ALT 22 U/L      Alkaline Phosphatase 94 U/L      Total Protein 7 9 g/dL      Albumin 3 8 g/dL      Total Bilirubin 0 40 mg/dL      eGFR 99 ml/min/1 73sq m     Narrative:      Jv guidelines for Chronic Kidney Disease (CKD):   •  Stage 1 with normal or high GFR (GFR > 90 mL/min/1 73 square meters)  •  Stage 2 Mild CKD (GFR = 60-89 mL/min/1 73 square meters)  •  Stage 3A Moderate CKD (GFR = 45-59 mL/min/1 73 square meters)  •  Stage 3B Moderate CKD (GFR = 30-44 mL/min/1 73 square meters)  •  Stage 4 Severe CKD (GFR = 15-29 mL/min/1 73 square meters)  •  Stage 5 End Stage CKD (GFR <15 mL/min/1 73 square meters)  Note: GFR calculation is accurate only with a steady state creatinine    HS Troponin 0hr (reflex protocol) [[id number]]  (Normal) Collected: 02/02/23 1433    Lab Status: Final result Specimen: Blood from Arm, Right Updated: 02/02/23 1520     hs TnI 0hr 11 ng/L     D-Dimer [[id number]]  (Abnormal) Collected: 02/02/23 1433    Lab Status: Final result Specimen: Blood from Arm, Right Updated: 02/02/23 1505     D-Dimer, Quant 0 70 ug/ml FEU     Narrative: In the evaluation for possible pulmonary embolism, in the appropriate (Well's Score of 4 or less) patient, the age adjusted d-dimer cutoff for this patient can be calculated as:    Age x 0 01 (in ug/mL) for Age-adjusted D-dimer exclusion threshold for a patient over 50 years      Protime-INR [[id number]]  (Normal) Collected: 02/02/23 1433    Lab Status: Final result Specimen: Blood from Arm, Right Updated: 02/02/23 1459     Protime 12 9 seconds      INR 0 91    APTT [[id number]]  (Normal) Collected: 02/02/23 1433    Lab Status: Final result Specimen: Blood from Arm, Right Updated: 02/02/23 1459     PTT 23 seconds     CBC and differential [[id number]]  (Abnormal) Collected: 02/02/23 1433    Lab Status: Final result Specimen: Blood from Arm, Right Updated: 02/02/23 1445     WBC 7 03 Thousand/uL      RBC 4 31 Million/uL      Hemoglobin 14 5 g/dL      Hematocrit 42 8 %      MCV 99 fL      MCH 33 6 pg      MCHC 33 9 g/dL      RDW 11 7 %      MPV 9 9 fL      Platelets 076 Thousands/uL      nRBC 0 /100 WBCs      Neutrophils Relative 69 %      Immat GRANS % 0 %      Lymphocytes Relative 20 %      Monocytes Relative 10 %      Eosinophils Relative 1 %      Basophils Relative 0 %      Neutrophils Absolute 4 81 Thousands/µL      Immature Grans Absolute 0 01 Thousand/uL      Lymphocytes Absolute 1 40 Thousands/µL      Monocytes Absolute 0 69 Thousand/µL      Eosinophils Absolute 0 10 Thousand/µL      Basophils Absolute 0 02 Thousands/µL                  CTA dissection protocol chest abdomen pelvis w wo contrast   Final Result by Kaye Mars MD (02/02 1643)      No thoracic aortic dissection   No pulmonary embolism   No acute consolidation   Incidentally noted are less than 6 mm nodules right upper lung  Based on current Fleischner Society 2017 Guidelines on incidental pulmonary nodule,  follow-up CT at 12 months can be considered  Osteoporosis with chronic compression fractures of the multiple vertebrae                  Workstation performed: [id number]                    Procedures  ECG 12 Lead Documentation Only    Date/Time: 2/2/2023 1:34 PM  Performed by: Evgeny Tong PA-C  Authorized by: Evgeny Tong PA-C     Indications / Diagnosis:  Chest pain  Patient location:  ED  Previous ECG:     Previous ECG:  Compared to current    Comparison ECG info:  PVC no longer present  Similarity:  Changes noted  Rate:     ECG rate:  90    ECG rate assessment: normal    Rhythm:     Rhythm: sinus rhythm    Conduction:     Conduction: normal    ST segments:     ST segments:  Normal  T waves:     T waves: normal               ED Course             HEART Risk Score    Flowsheet Row Most Recent Value   Heart Score Risk Calculator    History 0 Filed at: 02/02/2023 1655   ECG 0 Filed at: 02/02/2023 1655   Age 1 Filed at: 02/02/2023 1655   Risk Factors 2 Filed at: 02/02/2023 1655   Troponin 0 Filed at: 02/02/2023 1655   HEART Score 3 Filed at: 02/02/2023 1655                        SBIRT 22yo+    Flowsheet Row Most Recent Value   SBIRT (23 yo +)    In order to provide better care to our patients, we are screening all of our patients for alcohol and drug use  Would it be okay to ask you these screening questions? Yes Filed at: 02/02/2023 1330   Initial Alcohol Screen: US AUDIT-C     1  How often do you have a drink containing alcohol? 0 Filed at: 02/02/2023 1330   2  How many drinks containing alcohol do you have on a typical day you are drinking? 0 Filed at: 02/02/2023 1330   3a  Male UNDER 65:  How often do you have five or more drinks on one occasion? 0 Filed at: 02/02/2023 1330   3b  FEMALE Any Age, or MALE 65+: How often do you have 4 or more drinks on one occassion? 0 Filed at: 02/02/2023 1330   Audit-C Score 0 Filed at: 02/02/2023 1330   ANNE-MARIE: How many times in the past year have you    Used an illegal drug or used a prescription medication for non-medical reasons? Never Filed at: 02/02/2023 1330                    Medical Decision Making  Patient with chest pain radiating to back, will order labs, EKG, CT scan to r/o cardiopulmonary disease, dissection  No acute abnormalities on CT scan, no dissection or PE  He does have pulmonary nodule on right, d/w patient the importance of f/u with PCP for close monitoring of nodule  Labs normal, ddimer elevated, but CT negative for PE  Patient already aware of compression fracture lumbar which was due to a 20 foot fall in the past   Neuro intact  Will prescribe muscle relaxant for pain and advised f/u with PCP for recheck  D/w patient most likely musculoskeletal      Abnormal CT of the abdomen: acute illness or injury  Back pain: acute illness or injury  Chest pain: acute illness or injury  Pulmonary nodule, right: acute illness or injury  Amount and/or Complexity of Data Reviewed  Labs: ordered  Radiology: ordered  ECG/medicine tests: ordered and independent interpretation performed  Risk  Prescription drug management  Disposition  Final diagnoses:   Chest pain   Back pain   Pulmonary nodule, right   Abnormal CT of the abdomen - chronic compression deformity L4, L3, L2, L1        Time reflects when diagnosis was documented in both MDM as applicable and the Disposition within this note     Time User Action Codes Description Comment    2/2/2023  4:53 PM Loretta Panda [R07 9] Chest pain     2/2/2023  4:54 PM Sedonia Savers Add [M54 9] Back pain     2/2/2023  4:54 PM Sedonia Savers Add [R91 1] Pulmonary nodule, right     2/2/2023  4:54 PM Erlene Stage DEAN Add [R93 5] Abnormal CT of the abdomen     2/2/2023  4:55 PM Lillian Najera Modify [R93 5] Abnormal CT of the abdomen chronic compression deformity L4, L3, L2, L1        ED Disposition     ED Disposition   Discharge    Condition   Stable    Date/Time   Thu Feb 2, 2023  5:02 PM    Comment   Pradip Romelurvashi [facility] discharge to home/self care  Follow-up Information     Follow up With Specialties Details Why [address], DO Family Medicine Schedule an appointment as soon as possible for a visit in 1 week For recheck [address] [place] [address] Road            Discharge Medication List as of 2/2/2023  5:11 PM      START taking these medications    Details   methocarbamol (ROBAXIN) 500 mg tablet Take 1 tablet (500 mg total) by mouth 3 (three) times a day, Starting Thu 2/2/2023, Normal         CONTINUE these medications which have NOT CHANGED    Details   Cholecalciferol (VITAMIN D3) 2000 units capsule Take 2,000 Units by mouth daily, Historical Med      divalproex sodium (DEPAKOTE ER) 500 mg 24 hr tablet Patient takes 2 tablets in the morning, 3 tablets in the evening , No Print      phenytoin (DILANTIN) 50 mg tablet Chew 2 tablets (100 mg total) 2 (two) times a day Start on August 17th, 2020!, Starting Sat 8/15/2020, Print      rosuvastatin (CRESTOR) 20 MG tablet Take 20 mg by mouth daily, Starting Tue 8/6/2019, Historical Med             No discharge procedures on file      PDMP Review       Value Time User    PDMP Reviewed  Yes 8/27/2021 12:26 PM Quita Singh DO          ED Provider  Electronically Signed by           Wander Boyce PA-C  02/02/23 2605

## 2023-02-02 NOTE — DISCHARGE INSTRUCTIONS
Rest, heating pad to back  Tylenol/motrin for discomfort  Take robaxin 3 times a day  Follow up with family doctor in 5-7 days for recheck  Return to ER if symptoms worsen

## 2023-03-19 ENCOUNTER — HOSPITAL ENCOUNTER (EMERGENCY)
Facility: HOSPITAL | Age: 63
Discharge: HOME | End: 2023-03-19
Attending: EMERGENCY MEDICINE

## 2023-03-19 VITALS
SYSTOLIC BLOOD PRESSURE: 167 MMHG | RESPIRATION RATE: 18 BRPM | HEART RATE: 105 BPM | TEMPERATURE: 97.8 F | DIASTOLIC BLOOD PRESSURE: 98 MMHG | OXYGEN SATURATION: 98 %

## 2023-03-19 DIAGNOSIS — G40.909 SEIZURE DISORDER (HCC): ICD-10-CM

## 2023-03-19 DIAGNOSIS — R11.2 NAUSEA AND VOMITING: Primary | ICD-10-CM

## 2023-03-19 LAB
2HR DELTA HS TROPONIN: -1 NG/L
ALBUMIN SERPL BCP-MCNC: 4.2 G/DL (ref 3.5–5)
ALP SERPL-CCNC: 85 U/L (ref 34–104)
ALT SERPL W P-5'-P-CCNC: 9 U/L (ref 7–52)
AMPHETAMINES SERPL QL SCN: NEGATIVE
ANION GAP SERPL CALCULATED.3IONS-SCNC: 11 MMOL/L (ref 4–13)
AST SERPL W P-5'-P-CCNC: 12 U/L (ref 13–39)
BACTERIA UR QL AUTO: ABNORMAL /HPF
BARBITURATES UR QL: NEGATIVE
BASOPHILS # BLD AUTO: 0.03 THOUSANDS/ÂΜL (ref 0–0.1)
BASOPHILS NFR BLD AUTO: 1 % (ref 0–1)
BENZODIAZ UR QL: NEGATIVE
BILIRUB SERPL-MCNC: 0.68 MG/DL (ref 0.2–1)
BILIRUB UR QL STRIP: NEGATIVE
BUN SERPL-MCNC: 19 MG/DL (ref 5–25)
CALCIUM SERPL-MCNC: 9.2 MG/DL (ref 8.4–10.2)
CARDIAC TROPONIN I PNL SERPL HS: 10 NG/L
CARDIAC TROPONIN I PNL SERPL HS: 9 NG/L
CHLORIDE SERPL-SCNC: 103 MMOL/L (ref 96–108)
CLARITY UR: ABNORMAL
CO2 SERPL-SCNC: 21 MMOL/L (ref 21–32)
COCAINE UR QL: NEGATIVE
COLOR UR: ABNORMAL
CREAT SERPL-MCNC: 0.73 MG/DL (ref 0.6–1.3)
EOSINOPHIL # BLD AUTO: 0.03 THOUSAND/ÂΜL (ref 0–0.61)
EOSINOPHIL NFR BLD AUTO: 1 % (ref 0–6)
ERYTHROCYTE [DISTWIDTH] IN BLOOD BY AUTOMATED COUNT: 11.6 % (ref 11.6–15.1)
GFR SERPL CREATININE-BSD FRML MDRD: 98 ML/MIN/1.73SQ M
GLUCOSE SERPL-MCNC: 104 MG/DL (ref 65–140)
GLUCOSE UR STRIP-MCNC: NEGATIVE MG/DL
HCT VFR BLD AUTO: 42.3 % (ref 36.5–49.3)
HGB BLD-MCNC: 14.9 G/DL (ref 12–17)
HGB UR QL STRIP.AUTO: NEGATIVE
IMM GRANULOCYTES # BLD AUTO: 0.01 THOUSAND/UL (ref 0–0.2)
IMM GRANULOCYTES NFR BLD AUTO: 0 % (ref 0–2)
KETONES UR STRIP-MCNC: ABNORMAL MG/DL
LEUKOCYTE ESTERASE UR QL STRIP: NEGATIVE
LIPASE SERPL-CCNC: 38 U/L (ref 11–82)
LYMPHOCYTES # BLD AUTO: 1.98 THOUSANDS/ÂΜL (ref 0.6–4.47)
LYMPHOCYTES NFR BLD AUTO: 31 % (ref 14–44)
MAGNESIUM SERPL-MCNC: 1.9 MG/DL (ref 1.9–2.7)
MCH RBC QN AUTO: 33 PG (ref 26.8–34.3)
MCHC RBC AUTO-ENTMCNC: 35.2 G/DL (ref 31.4–37.4)
MCV RBC AUTO: 94 FL (ref 82–98)
METHADONE UR QL: NEGATIVE
MONOCYTES # BLD AUTO: 0.82 THOUSAND/ÂΜL (ref 0.17–1.22)
MONOCYTES NFR BLD AUTO: 13 % (ref 4–12)
MUCOUS THREADS UR QL AUTO: ABNORMAL
NEUTROPHILS # BLD AUTO: 3.44 THOUSANDS/ÂΜL (ref 1.85–7.62)
NEUTS SEG NFR BLD AUTO: 54 % (ref 43–75)
NITRITE UR QL STRIP: NEGATIVE
NON-SQ EPI CELLS URNS QL MICRO: ABNORMAL /HPF
NRBC BLD AUTO-RTO: 0 /100 WBCS
OPIATES UR QL SCN: NEGATIVE
OXYCODONE+OXYMORPHONE UR QL SCN: NEGATIVE
PCP UR QL: NEGATIVE
PH UR STRIP.AUTO: 5.5 [PH]
PHENYTOIN SERPL-MCNC: <2.5 UG/ML (ref 10–20)
PLATELET # BLD AUTO: 279 THOUSANDS/UL (ref 149–390)
PMV BLD AUTO: 9.6 FL (ref 8.9–12.7)
POTASSIUM SERPL-SCNC: 3.8 MMOL/L (ref 3.5–5.3)
PROT SERPL-MCNC: 7.1 G/DL (ref 6.4–8.4)
PROT UR STRIP-MCNC: ABNORMAL MG/DL
RBC # BLD AUTO: 4.52 MILLION/UL (ref 3.88–5.62)
RBC #/AREA URNS AUTO: ABNORMAL /HPF
SODIUM SERPL-SCNC: 135 MMOL/L (ref 135–147)
SP GR UR STRIP.AUTO: >=1.03 (ref 1–1.03)
THC UR QL: POSITIVE
UROBILINOGEN UR STRIP-ACNC: <2 MG/DL
VALPROATE SERPL-MCNC: 18 UG/ML (ref 50–100)
WBC # BLD AUTO: 6.31 THOUSAND/UL (ref 4.31–10.16)
WBC #/AREA URNS AUTO: ABNORMAL /HPF

## 2023-03-19 RX ORDER — PHENYTOIN SODIUM 100 MG/1
100 CAPSULE, EXTENDED RELEASE ORAL ONCE
Status: COMPLETED | OUTPATIENT
Start: 2023-03-19 | End: 2023-03-19

## 2023-03-19 RX ORDER — DIVALPROEX SODIUM 500 MG/1
1000 TABLET, EXTENDED RELEASE ORAL DAILY
Status: DISCONTINUED | OUTPATIENT
Start: 2023-03-19 | End: 2023-03-19 | Stop reason: HOSPADM

## 2023-03-19 RX ORDER — KETOROLAC TROMETHAMINE 30 MG/ML
15 INJECTION, SOLUTION INTRAMUSCULAR; INTRAVENOUS ONCE
Status: COMPLETED | OUTPATIENT
Start: 2023-03-19 | End: 2023-03-19

## 2023-03-19 RX ORDER — ONDANSETRON 4 MG/1
4 TABLET, ORALLY DISINTEGRATING ORAL EVERY 6 HOURS PRN
Qty: 20 TABLET | Refills: 0 | Status: SHIPPED | OUTPATIENT
Start: 2023-03-19

## 2023-03-19 RX ADMIN — SODIUM CHLORIDE 1000 ML: 0.9 INJECTION, SOLUTION INTRAVENOUS at 13:46

## 2023-03-19 RX ADMIN — PHENYTOIN SODIUM 100 MG: 100 CAPSULE ORAL at 15:47

## 2023-03-19 RX ADMIN — KETOROLAC TROMETHAMINE 15 MG: 30 INJECTION, SOLUTION INTRAMUSCULAR; INTRAVENOUS at 15:47

## 2023-03-19 RX ADMIN — DIVALPROEX SODIUM 1000 MG: 500 TABLET, EXTENDED RELEASE ORAL at 15:47

## 2023-03-19 NOTE — ED PROVIDER NOTES
History  Chief Complaint   Patient presents with   • Abdominal Pain     Pt states he ate a bad hoagie from [address] on Thursday and hasnt been able to stop throwing up since  Pt concerned about throwing up his seizure medicine  This 76-year-old male with history of TBI, seizure disorder, anxiety chronic back pain states that since eating a chicken salad hoagie from while walking 3 nights ago he has been vomiting very frequently  He states he has not been able to keep down any of his routine medications since that night  He also vomits when not eating or drinking  Denies fever, headache, abdominal pain, diarrhea, chest pain, palpitations, acute neurologic changes and syncope  Although states he has not kept anything down for the last few days he states his urination and bowel movements have not changed  He has not had orthostatic symptoms out of the ordinary  He has not had any breakthrough seizure but is concerned about inability to absorb his medications  Patient has stable vital signs, looks euvolemic and at baseline on exam           Prior to Admission Medications   Prescriptions Last Dose Informant Patient Reported? Taking? Cholecalciferol (VITAMIN D3) 2000 units capsule 3/19/2023  Yes Yes   Sig: Take 2,000 Units by mouth daily   divalproex sodium (DEPAKOTE ER) 500 mg 24 hr tablet 3/19/2023  No Yes   Sig: Patient takes 2 tablets in the morning, 3 tablets in the evening  methocarbamol (ROBAXIN) 500 mg tablet 3/19/2023  No Yes   Sig: Take 1 tablet (500 mg total) by mouth 3 (three) times a day   phenytoin (DILANTIN) 50 mg tablet 3/19/2023  No Yes   Sig: Chew 2 tablets (100 mg total) 2 (two) times a day Start on August 17th, 2020!    rosuvastatin (CRESTOR) 20 MG tablet Not Taking  Yes No   Sig: Take 20 mg by mouth daily   Patient not taking: Reported on 8/27/2021      Facility-Administered Medications: None       Past Medical History:   Diagnosis Date   • Anxiety    • Back pain    • History of herniated intervertebral disc    • Seizures (HCC)    • TBI (traumatic brain injury)        Past Surgical History:   Procedure Laterality Date   • BACK SURGERY     • BRAIN SURGERY     • LEG SURGERY         Family History   Family history unknown: Yes     I have reviewed and agree with the history as documented  E-Cigarette/Vaping   • E-Cigarette Use Never User      E-Cigarette/Vaping Substances   • Nicotine No    • THC No    • CBD No    • Flavoring No    • Other No    • Unknown No      Social History     Tobacco Use   • Smoking status: Every Day     Packs/day: 0 50     Years: 15 00     Pack years: 7 50     Types: Cigarettes   • Smokeless tobacco: Current   • Tobacco comments:     14 years   Vaping Use   • Vaping Use: Never used   Substance Use Topics   • Alcohol use: Never   • Drug use: Yes     Types: Marijuana     Comment: medical marijuana card       Review of Systems   Constitutional: Negative for fever  Respiratory: Negative for cough and shortness of breath  Cardiovascular: Negative for chest pain, palpitations and leg swelling  Gastrointestinal: Positive for nausea and vomiting  Negative for abdominal distention, abdominal pain, blood in stool, constipation and diarrhea  Genitourinary: Negative for decreased urine volume, difficulty urinating and dysuria  Musculoskeletal: Positive for back pain ( Chronic)  Negative for neck stiffness  Skin: Negative for rash and wound  Neurological: Positive for light-headedness ( At times, chronically)  Negative for syncope  All other systems reviewed and are negative  Physical Exam  Physical Exam  Vitals and nursing note reviewed  Constitutional:       General: He is not in acute distress  Appearance: He is well-developed and normal weight  He is not ill-appearing or diaphoretic  HENT:      Head: Normocephalic and atraumatic        Right Ear: External ear normal       Left Ear: External ear normal       Mouth/Throat:      Mouth: Mucous membranes are moist       Pharynx: Oropharynx is clear  Eyes:      General: No scleral icterus  Conjunctiva/sclera: Conjunctivae normal       Pupils: Pupils are equal, round, and reactive to light  Neck:      Vascular: No JVD  Cardiovascular:      Rate and Rhythm: Normal rate and regular rhythm  Heart sounds: Normal heart sounds  No murmur heard  Pulmonary:      Effort: Pulmonary effort is normal       Breath sounds: Normal breath sounds  Abdominal:      General: Abdomen is flat  Bowel sounds are normal  There is no abdominal bruit  Palpations: Abdomen is soft  There is no fluid wave or mass  Tenderness: There is abdominal tenderness ( very slight) in the left lower quadrant  There is no right CVA tenderness, left CVA tenderness, guarding or rebound  Negative signs include Rovsing's sign  Hernia: No hernia is present  Musculoskeletal:         General: No tenderness  Normal range of motion  Cervical back: Normal range of motion and neck supple  Lymphadenopathy:      Cervical: No cervical adenopathy  Skin:     General: Skin is warm and dry  Capillary Refill: Capillary refill takes less than 2 seconds  Findings: No rash  Neurological:      General: No focal deficit present  Mental Status: He is alert and oriented to person, place, and time  Cranial Nerves: No cranial nerve deficit  Motor: No weakness  Coordination: Coordination normal       Deep Tendon Reflexes: Reflexes are normal and symmetric     Psychiatric:         Mood and Affect: Mood normal          Behavior: Behavior normal          Vital Signs  ED Triage Vitals   Temperature Pulse Respirations Blood Pressure SpO2   03/19/23 1321 03/19/23 1319 03/19/23 1319 03/19/23 1320 03/19/23 1320   97 8 °F (36 6 °C) 105 18 167/98 98 %      Temp Source Heart Rate Source Patient Position - Orthostatic VS BP Location FiO2 (%)   03/19/23 1321 -- -- -- --   Oral          Pain Score       03/19/23 1319       5 Vitals:    03/19/23 1319 03/19/23 1320   BP:  167/98   Pulse: 105          Visual Acuity      ED Medications  Medications   divalproex sodium (DEPAKOTE ER) 24 hr tablet 1,000 mg (1,000 mg Oral Given 3/19/23 1547)   sodium chloride 0 9 % bolus 1,000 mL (0 mL Intravenous Stopped 3/19/23 1650)   ketorolac (TORADOL) injection 15 mg (15 mg Intravenous Given 3/19/23 1547)   phenytoin (DILANTIN) ER capsule 100 mg (100 mg Oral Given 3/19/23 1547)       Diagnostic Studies  Results Reviewed     Procedure Component Value Units Date/Time    Urine Microscopic [[id number]]  (Abnormal) Collected: 03/19/23 1558    Lab Status: Final result Specimen: Urine, Clean Catch Updated: 03/19/23 1630     RBC, UA None Seen /hpf      WBC, UA 0-1 /hpf      Epithelial Cells Occasional /hpf      Bacteria, UA None Seen /hpf      MUCUS THREADS Moderate    HS Troponin I 2hr [[id number]]  (Normal) Collected: 03/19/23 1559    Lab Status: Final result Specimen: Blood from Arm, Left Updated: 03/19/23 1629     hs TnI 2hr 9 ng/L      Delta 2hr hsTnI -1 ng/L     Rapid drug screen, urine [[id number]]  (Abnormal) Collected: 03/19/23 1558    Lab Status: Final result Specimen: Urine, Clean Catch Updated: 03/19/23 1627     Amph/Meth UR Negative     Barbiturate Ur Negative     Benzodiazepine Urine Negative     Cocaine Urine Negative     Methadone Urine Negative     Opiate Urine Negative     PCP Ur Negative     THC Urine Positive     Oxycodone Urine Negative    Narrative:      Presumptive report  If requested, specimen will be sent to reference lab for confirmation  FOR MEDICAL PURPOSES ONLY  IF CONFIRMATION NEEDED PLEASE CONTACT THE LAB WITHIN 5 DAYS      Drug Screen Cutoff Levels:  AMPHETAMINE/METHAMPHETAMINES  1000 ng/mL  BARBITURATES     200 ng/mL  BENZODIAZEPINES     200 ng/mL  COCAINE      300 ng/mL  METHADONE      300 ng/mL  OPIATES      300 ng/mL  PHENCYCLIDINE     25 ng/mL  THC       50 ng/mL  OXYCODONE      100 ng/mL    UA (URINE) with reflex to Scope [[id number]]  (Abnormal) Collected: 03/19/23 1558    Lab Status: Final result Specimen: Urine, Clean Catch Updated: 03/19/23 1620     Color, UA Light Orange     Clarity, UA Slightly Cloudy     Specific Gravity, UA >=1 030     pH, UA 5 5     Leukocytes, UA Negative     Nitrite, UA Negative     Protein, UA Trace mg/dl      Glucose, UA Negative mg/dl      Ketones, UA 40 (2+) mg/dl      Urobilinogen, UA <2 0 mg/dl      Bilirubin, UA Negative     Occult Blood, UA Negative    HS Troponin I 4hr [[id number]]     Lab Status: No result Specimen: Blood     HS Troponin 0hr (reflex protocol) [[id number]]  (Normal) Collected: 03/19/23 1345    Lab Status: Final result Specimen: Blood from Arm, Right Updated: 03/19/23 1439     hs TnI 0hr 10 ng/L     Phenytoin level, total [[id number]]  (Abnormal) Collected: 03/19/23 1345    Lab Status: Final result Specimen: Blood from Arm, Right Updated: 03/19/23 1420     Phenytoin Lvl <2 5 ug/mL     Lipase [[id number]]  (Normal) Collected: 03/19/23 1345    Lab Status: Final result Specimen: Blood from Arm, Right Updated: 03/19/23 1420     Lipase 38 u/L     Valproic acid level, total [[id number]]  (Abnormal) Collected: 03/19/23 1345    Lab Status: Final result Specimen: Blood from Arm, Right Updated: 03/19/23 1420     Valproic Acid, Total 18 ug/mL     Comprehensive metabolic panel [[id number]]  (Abnormal) Collected: 03/19/23 1345    Lab Status: Final result Specimen: Blood from Arm, Right Updated: 03/19/23 1420     Sodium 135 mmol/L      Potassium 3 8 mmol/L      Chloride 103 mmol/L      CO2 21 mmol/L      ANION GAP 11 mmol/L      BUN 19 mg/dL      Creatinine 0 73 mg/dL      Glucose 104 mg/dL      Calcium 9 2 mg/dL      AST 12 U/L      ALT 9 U/L      Alkaline Phosphatase 85 U/L      Total Protein 7 1 g/dL      Albumin 4 2 g/dL      Total Bilirubin 0 68 mg/dL      eGFR 98 ml/min/1 73sq m     Narrative:      [place] guidelines for Chronic Kidney Disease (CKD):   •  Stage 1 with normal or high GFR (GFR > 90 mL/min/1 73 square meters)  •  Stage 2 Mild CKD (GFR = 60-89 mL/min/1 73 square meters)  •  Stage 3A Moderate CKD (GFR = 45-59 mL/min/1 73 square meters)  •  Stage 3B Moderate CKD (GFR = 30-44 mL/min/1 73 square meters)  •  Stage 4 Severe CKD (GFR = 15-29 mL/min/1 73 square meters)  •  Stage 5 End Stage CKD (GFR <15 mL/min/1 73 square meters)  Note: GFR calculation is accurate only with a steady state creatinine    Magnesium [[id number]]  (Normal) Collected: 03/19/23 1345    Lab Status: Final result Specimen: Blood from Arm, Right Updated: 03/19/23 1420     Magnesium 1 9 mg/dL     CBC and differential [[id number]]  (Abnormal) Collected: 03/19/23 1345    Lab Status: Final result Specimen: Blood from Arm, Right Updated: 03/19/23 1351     WBC 6 31 Thousand/uL      RBC 4 52 Million/uL      Hemoglobin 14 9 g/dL      Hematocrit 42 3 %      MCV 94 fL      MCH 33 0 pg      MCHC 35 2 g/dL      RDW 11 6 %      MPV 9 6 fL      Platelets 204 Thousands/uL      nRBC 0 /100 WBCs      Neutrophils Relative 54 %      Immat GRANS % 0 %      Lymphocytes Relative 31 %      Monocytes Relative 13 %      Eosinophils Relative 1 %      Basophils Relative 1 %      Neutrophils Absolute 3 44 Thousands/µL      Immature Grans Absolute 0 01 Thousand/uL      Lymphocytes Absolute 1 98 Thousands/µL      Monocytes Absolute 0 82 Thousand/µL      Eosinophils Absolute 0 03 Thousand/µL      Basophils Absolute 0 03 Thousands/µL                  No orders to display              Procedures  ECG 12 Lead Documentation Only    Date/Time: 3/19/2023 1:31 PM  Performed by: Corinne Gaming DO  Authorized by: Corinne Gaming DO     ECG reviewed by me, the ED Provider: yes    Patient location:  ED  Previous ECG:     Previous ECG:  Compared to current    Similarity:  No change    Comparison to cardiac monitor: Yes    Rate:     ECG rate assessment: normal    Rhythm:     Rhythm: sinus rhythm    Ectopy:     Ectopy: none    QRS: QRS axis:  Normal    QRS intervals:  Normal  Conduction:     Conduction: normal    ST segments:     ST segments:  Normal  T waves:     T waves: normal    Other findings:     Other findings: LVH               ED Course  ED Course as of 03/19/23 1711   Sun Mar 19, 2023   1604 Discussed patient's medication regimen with him  He says he takes Dilantin 100 mg twice daily despite what I can find on care everywhere records of 50 mg chewable daily  He states that he is not tapering off Dilantin  States he takes Depakote as documented on his med list                                SBIRT 22yo+    Flowsheet Row Most Recent Value   SBIRT (25 yo +)    In order to provide better care to our patients, we are screening all of our patients for alcohol and drug use  Would it be okay to ask you these screening questions? Yes Filed at: 03/19/2023 1323   Initial Alcohol Screen: US AUDIT-C     1  How often do you have a drink containing alcohol? 0 Filed at: 03/19/2023 1323   2  How many drinks containing alcohol do you have on a typical day you are drinking? 0 Filed at: 03/19/2023 1323   3a  Male UNDER 65: How often do you have five or more drinks on one occasion? 0 Filed at: 03/19/2023 1323   Audit-C Score 0 Filed at: 03/19/2023 1323                    Medical Decision Making  43-year-old male with history of seizure disorder not able to take his antiepileptic medications due to frequent vomiting and diarrhea after eating a sandwich from a takeout store 3 days ago  Denies recent seizure, fever, headache and focal neurologic changes  Concern for dehydration, electrolyte abnormality, acute kidney injury, subtherapeutic antiepileptic drug levels  Labs reassuring  Oral Dilantin and Depakote given and tolerated  IV fluids given  P O  challenge passed  Prescribed ondansetron and instructed patient to go back to his current medications  Instructed to follow-up with his PCP, neurologist and neurosurgeon as needed      Amount and/or Complexity of Data Reviewed  Labs: ordered  Risk  Prescription drug management  Disposition  Final diagnoses:   Nausea and vomiting   Seizure disorder ([address] )     Time reflects when diagnosis was documented in both MDM as applicable and the Disposition within this note     Time User Action Codes Description Comment    3/19/2023  4:45 PM Gus Gonzalez Add [R11 2] Nausea and vomiting     3/19/2023  4:45 PM Gus Gonzalez Add [G40 909] Seizure disorder [facility])       ED Disposition     ED Disposition   Discharge    Condition   Stable    Date/Time   Sun Mar 19, 2023  4:45 PM    Comment   Yung Tam discharge to home/self care  Follow-up Information     Follow up With Specialties Details Why [address], DO Family Medicine Call  As needed [facility] [place] [address]  [phone]            Discharge Medication List as of 3/19/2023  4:48 PM      START taking these medications    Details   ondansetron (Zofran ODT) 4 mg disintegrating tablet Take 1 tablet (4 mg total) by mouth every 6 (six) hours as needed for nausea or vomiting, Starting Sun 3/19/2023, Normal         CONTINUE these medications which have NOT CHANGED    Details   Cholecalciferol (VITAMIN D3) 2000 units capsule Take 2,000 Units by mouth daily, Historical Med      divalproex sodium (DEPAKOTE ER) 500 mg 24 hr tablet Patient takes 2 tablets in the morning, 3 tablets in the evening , No Print      methocarbamol (ROBAXIN) 500 mg tablet Take 1 tablet (500 mg total) by mouth 3 (three) times a day, Starting Thu 2/2/2023, Normal      phenytoin (DILANTIN) 50 mg tablet Chew 2 tablets (100 mg total) 2 (two) times a day Start on August 17th, 2020!, Starting Sat 8/15/2020, Print      rosuvastatin (CRESTOR) 20 MG tablet Take 20 mg by mouth daily, Starting Tue 8/6/2019, Historical Med             No discharge procedures on file      PDMP Review       Value Time User    PDMP Reviewed  Yes 8/27/2021 12:26 PM Megan Meyer DO          ED Provider  Electronically Signed by           Megan Meyer,   03/19/23 [address], DO  03/19/23 5480

## 2023-03-19 NOTE — DISCHARGE INSTRUCTIONS
Take your routine medications  Consider medication as we gave you today to have been your morning doses  Be sure to take the evening doses today  Have frequent small meals and keep well-hydrated  Use ondansetron if needed for nausea and vomiting  Follow-up with your PCP tomorrow has needed  You may also speak to your neurologist and neurosurgeon if there is difficulty keeping down your medications

## 2023-03-20 LAB
ATRIAL RATE: 76 BPM
P AXIS: 82 DEGREES
PR INTERVAL: 140 MS
QRS AXIS: 62 DEGREES
QRSD INTERVAL: 88 MS
QT INTERVAL: 368 MS
QTC INTERVAL: 414 MS
T WAVE AXIS: 18 DEGREES
VENTRICULAR RATE: 76 BPM

## 2023-06-14 ENCOUNTER — HOSPITAL ENCOUNTER (EMERGENCY)
Facility: HOSPITAL | Age: 63
Discharge: HOME | End: 2023-06-14
Attending: EMERGENCY MEDICINE | Admitting: EMERGENCY MEDICINE
Payer: MEDICARE

## 2023-06-14 VITALS
RESPIRATION RATE: 20 BRPM | WEIGHT: 140 LBS | HEIGHT: 72 IN | TEMPERATURE: 98.1 F | SYSTOLIC BLOOD PRESSURE: 135 MMHG | HEART RATE: 71 BPM | DIASTOLIC BLOOD PRESSURE: 83 MMHG | BODY MASS INDEX: 18.96 KG/M2 | OXYGEN SATURATION: 99 %

## 2023-06-14 DIAGNOSIS — S39.012A STRAIN OF LUMBAR REGION, INITIAL ENCOUNTER: Primary | ICD-10-CM

## 2023-06-14 DIAGNOSIS — M54.9 BACK PAIN: ICD-10-CM

## 2023-06-14 RX ORDER — LIDOCAINE 50 MG/G
2 PATCH TOPICAL ONCE
Status: DISCONTINUED | OUTPATIENT
Start: 2023-06-14 | End: 2023-06-14 | Stop reason: HOSPADM

## 2023-06-14 RX ORDER — METHOCARBAMOL 500 MG/1
500 TABLET, FILM COATED ORAL ONCE
Status: COMPLETED | OUTPATIENT
Start: 2023-06-14 | End: 2023-06-14

## 2023-06-14 RX ORDER — METHOCARBAMOL 500 MG/1
500 TABLET, FILM COATED ORAL EVERY 8 HOURS PRN
Qty: 20 TABLET | Refills: 0 | Status: SHIPPED | OUTPATIENT
Start: 2023-06-14

## 2023-06-14 RX ORDER — KETOROLAC TROMETHAMINE 30 MG/ML
30 INJECTION, SOLUTION INTRAMUSCULAR; INTRAVENOUS ONCE
Status: COMPLETED | OUTPATIENT
Start: 2023-06-14 | End: 2023-06-14

## 2023-06-14 RX ADMIN — METHOCARBAMOL 500 MG: 500 TABLET ORAL at 14:24

## 2023-06-14 RX ADMIN — KETOROLAC TROMETHAMINE 30 MG: 30 INJECTION, SOLUTION INTRAMUSCULAR; INTRAVENOUS at 14:24

## 2023-06-14 RX ADMIN — LIDOCAINE 2 PATCH: 50 PATCH TOPICAL at 14:24

## 2023-06-14 NOTE — ED PROVIDER NOTES
History  Chief Complaint   Patient presents with   • Back Pain     Yesterday pt picked up water jug while shopping, when placing in shopping cart felt complete tear in lower back  History from patient and medical records past medical history anxiety herniated lumbar disc seizures TBI chronic back pain complains of exacerbation of his chronic pain since yesterday when he picked up a jug of water at the grocery store while shopping  Pain is worse with movements he has some numbness at the left foot which is similar to previous radiculopathy  He denies any loss of bladder or or bowel control  He took 5 tablets of ibuprofen around 9 this morning but he said that was 50 mg tablets I recommended he does not take that much at 1 time  Prior to Admission Medications   Prescriptions Last Dose Informant Patient Reported? Taking? Cholecalciferol (VITAMIN D3) 2000 units capsule   Yes No   Sig: Take 2,000 Units by mouth daily   divalproex sodium (DEPAKOTE ER) 500 mg 24 hr tablet   No No   Sig: Patient takes 2 tablets in the morning, 3 tablets in the evening  methocarbamol (ROBAXIN) 500 mg tablet   No No   Sig: Take 1 tablet (500 mg total) by mouth 3 (three) times a day   methocarbamol (ROBAXIN) 500 mg tablet   No Yes   Sig: Take 1 tablet (500 mg total) by mouth every 8 (eight) hours as needed for muscle spasms   ondansetron (Zofran ODT) 4 mg disintegrating tablet   No No   Sig: Take 1 tablet (4 mg total) by mouth every 6 (six) hours as needed for nausea or vomiting   phenytoin (DILANTIN) 50 mg tablet   No No   Sig: Chew 2 tablets (100 mg total) 2 (two) times a day Start on August 17th, 2020!    rosuvastatin (CRESTOR) 20 MG tablet   Yes No   Sig: Take 20 mg by mouth daily   Patient not taking: Reported on 8/27/2021      Facility-Administered Medications: None       Past Medical History:   Diagnosis Date   • Anxiety    • Back pain    • History of herniated intervertebral disc    • Seizures (HCC)    • TBI (traumatic brain injury)        Past Surgical History:   Procedure Laterality Date   • BACK SURGERY     • BRAIN SURGERY     • LEG SURGERY         Family History   Family history unknown: Yes     I have reviewed and agree with the history as documented  E-Cigarette/Vaping   • E-Cigarette Use Never User      E-Cigarette/Vaping Substances   • Nicotine No    • THC No    • CBD No    • Flavoring No    • Other No    • Unknown No      Social History     Tobacco Use   • Smoking status: Every Day     Packs/day: 0 50     Years: 15 00     Total pack years: 7 50     Types: Cigarettes   • Smokeless tobacco: Current   • Tobacco comments:     14 years   Vaping Use   • Vaping Use: Never used   Substance Use Topics   • Alcohol use: Never   • Drug use: Yes     Types: Marijuana     Comment: medical marijuana card       Review of Systems   Constitutional: Negative for chills and fever  HENT: Negative for rhinorrhea and sore throat  Respiratory: Negative for shortness of breath  Cardiovascular: Negative for chest pain  Gastrointestinal: Negative for constipation, diarrhea, nausea and vomiting  Genitourinary: Negative for dysuria and frequency  Musculoskeletal: Positive for arthralgias and back pain  Skin: Negative for rash  Neurological: Positive for numbness  Negative for weakness  All other systems reviewed and are negative  Physical Exam  Physical Exam  Vitals and nursing note reviewed  Constitutional:       Appearance: He is well-developed  HENT:      Head: Normocephalic and atraumatic  Right Ear: External ear normal       Left Ear: External ear normal       Nose: Nose normal    Eyes:      Conjunctiva/sclera: Conjunctivae normal       Pupils: Pupils are equal, round, and reactive to light  Cardiovascular:      Rate and Rhythm: Normal rate and regular rhythm  Heart sounds: Normal heart sounds  Pulmonary:      Effort: Pulmonary effort is normal  No respiratory distress        Breath sounds: Normal breath sounds  No wheezing  Abdominal:      General: Bowel sounds are normal  There is no distension  Palpations: Abdomen is soft  Tenderness: There is no abdominal tenderness  Musculoskeletal:         General: No deformity  Normal range of motion  Cervical back: Normal range of motion and neck supple  No bony tenderness  No spinous process tenderness  Thoracic back: No bony tenderness  Lumbar back: Spasms and tenderness present  No bony tenderness  Negative right straight leg raise test and negative left straight leg raise test         Back:    Skin:     General: Skin is warm and dry  Findings: No rash  Neurological:      General: No focal deficit present  Mental Status: He is alert  GCS: GCS eye subscore is 4  GCS verbal subscore is 5  GCS motor subscore is 6  Sensory: Sensation is intact  No sensory deficit  Motor: Motor function is intact  Psychiatric:         Mood and Affect: Mood normal          Vital Signs  ED Triage Vitals [06/14/23 1318]   Temperature Pulse Respirations Blood Pressure SpO2   98 1 °F (36 7 °C) 71 20 135/83 99 %      Temp Source Heart Rate Source Patient Position - Orthostatic VS BP Location FiO2 (%)   Oral Monitor Sitting Left arm --      Pain Score       10 - Worst Possible Pain           Vitals:    06/14/23 1318   BP: 135/83   Pulse: 71   Patient Position - Orthostatic VS: Sitting         Visual Acuity      ED Medications  Medications   ketorolac (TORADOL) injection 30 mg (30 mg Intramuscular Given 6/14/23 1424)   methocarbamol (ROBAXIN) tablet 500 mg (500 mg Oral Given 6/14/23 1424)       Diagnostic Studies  Results Reviewed     None                 No orders to display              Procedures  Procedures         ED Course                               SBIRT 20yo+    Flowsheet Row Most Recent Value   Initial Alcohol Screen: US AUDIT-C     1  How often do you have a drink containing alcohol? 0 Filed at: 06/14/2023 1400   2  How many drinks containing alcohol do you have on a typical day you are drinking? 0 Filed at: 06/14/2023 1400   3a  Male UNDER 65: How often do you have five or more drinks on one occasion? 0 Filed at: 06/14/2023 1400   3b  FEMALE Any Age, or MALE 65+: How often do you have 4 or more drinks on one occassion? 0 Filed at: 06/14/2023 1400   Audit-C Score 0 Filed at: 06/14/2023 1400   ANNE-MARIE: How many times in the past year have you    Used an illegal drug or used a prescription medication for non-medical reasons? Never Filed at: 06/14/2023 1400                    Medical Decision Making  Differential includes but not limited to acute lumbar muscle strain, less likely lumbar radiculopathy or cauda equina syndrome, less likely lumbar fracture    Risk  Prescription drug management  Disposition  Final diagnoses:   Strain of lumbar region, initial encounter     Time reflects when diagnosis was documented in both MDM as applicable and the Disposition within this note     Time User Action Codes Description Comment    6/14/2023  2:11 PM Ro Veras Add [S39 012A] Strain of lumbar region, initial encounter     6/14/2023  2:11 PM Ro Edgardoe Add [M54 9] Back pain       ED Disposition     ED Disposition   Discharge    Condition   Stable    Date/Time   Wed Jun 14, 2023  2:11 PM    [address] discharge to home/self care                 Follow-up Information    None         Discharge Medication List as of 6/14/2023  2:12 PM      CONTINUE these medications which have CHANGED    Details   methocarbamol (ROBAXIN) 500 mg tablet Take 1 tablet (500 mg total) by mouth every 8 (eight) hours as needed for muscle spasms, Starting Wed 6/14/2023, Normal         CONTINUE these medications which have NOT CHANGED    Details   Cholecalciferol (VITAMIN D3) 2000 units capsule Take 2,000 Units by mouth daily, Historical Med      divalproex sodium (DEPAKOTE ER) 500 mg 24 hr tablet Patient takes 2 tablets in the morning, 3 tablets in the evening , No Print      ondansetron (Zofran ODT) 4 mg disintegrating tablet Take 1 tablet (4 mg total) by mouth every 6 (six) hours as needed for nausea or vomiting, Starting Sun 3/19/2023, Normal      phenytoin (DILANTIN) 50 mg tablet Chew 2 tablets (100 mg total) 2 (two) times a day Start on August 17th, 2020!, Starting Sat 8/15/2020, Print      rosuvastatin (CRESTOR) 20 MG tablet Take 20 mg by mouth daily, Starting Tue 8/6/2019, Historical Med                 PDMP Review       Value Time User    PDMP Reviewed  Yes 8/27/2021 12:26 PM Iesha Zafar DO          ED Provider  Electronically Signed by           Clive Scott DO  06/14/23 5042

## 2023-06-15 ENCOUNTER — TELEPHONE (OUTPATIENT)
Dept: PHYSICAL THERAPY | Facility: OTHER | Age: 63
End: 2023-06-15

## 2023-06-15 NOTE — TELEPHONE ENCOUNTER
Call placed to the patient per [facility] referral     Left a vague message on the VM, since it appeared to be the phone umber for a business  This is the 1st attempt to reach the patient  Will defer per protocol

## 2023-06-20 NOTE — TELEPHONE ENCOUNTER
Call placed to the patient per [facility] referral     Voice message left for patient to call back  Phone number and hours of business provided  This the 2nd attempt to reach the patient  Will defer per protocol  Unsure if this is the patient's phone number   Might be a business